# Patient Record
Sex: FEMALE | Race: WHITE | NOT HISPANIC OR LATINO | Employment: FULL TIME | URBAN - METROPOLITAN AREA
[De-identification: names, ages, dates, MRNs, and addresses within clinical notes are randomized per-mention and may not be internally consistent; named-entity substitution may affect disease eponyms.]

---

## 2020-08-28 ENCOUNTER — APPOINTMENT (OUTPATIENT)
Dept: CT IMAGING | Facility: HOSPITAL | Age: 50
End: 2020-08-28

## 2020-08-28 ENCOUNTER — APPOINTMENT (OUTPATIENT)
Dept: CARDIOLOGY | Facility: HOSPITAL | Age: 50
End: 2020-08-28

## 2020-08-28 ENCOUNTER — HOSPITAL ENCOUNTER (INPATIENT)
Facility: HOSPITAL | Age: 50
LOS: 2 days | Discharge: HOME | End: 2020-08-30
Attending: EMERGENCY MEDICINE | Admitting: INTERNAL MEDICINE

## 2020-08-28 DIAGNOSIS — Q05.9 SPINA BIFIDA, UNSPECIFIED HYDROCEPHALUS PRESENCE, UNSPECIFIED SPINAL REGION (HCC): ICD-10-CM

## 2020-08-28 DIAGNOSIS — I82.4Y3 DVT, LOWER EXTREMITY, PROXIMAL, ACUTE, BILATERAL (HCC): Primary | ICD-10-CM

## 2020-08-28 LAB
ALBUMIN SERPL-MCNC: 4 G/DL (ref 3.5–5.2)
ALBUMIN/GLOB SERPL: 1.3 G/DL
ALP SERPL-CCNC: 121 U/L (ref 39–117)
ALT SERPL W P-5'-P-CCNC: 16 U/L (ref 1–33)
ANION GAP SERPL CALCULATED.3IONS-SCNC: 11.8 MMOL/L (ref 5–15)
APTT PPP: 27.1 SECONDS (ref 22.7–35.4)
AST SERPL-CCNC: 16 U/L (ref 1–32)
BASOPHILS # BLD AUTO: 0.04 10*3/MM3 (ref 0–0.2)
BASOPHILS NFR BLD AUTO: 0.4 % (ref 0–1.5)
BH CV LOW VAS LEFT COMMON FEMORAL SPONT: 1
BH CV LOW VAS LEFT DISTAL FEMORAL SPONT: 1
BH CV LOW VAS LEFT EXTERNAL ILIAC AUGMENT: NORMAL
BH CV LOW VAS LEFT EXTERNAL ILIAC COMPRESS: NORMAL
BH CV LOW VAS LEFT EXTERNAL ILIAC SPONT: 1
BH CV LOW VAS LEFT EXTERNAL ILIAC THROMBUS: NORMAL
BH CV LOW VAS LEFT GREATER SAPH AK VESSEL: 1
BH CV LOW VAS LEFT GREATER SAPH BK VESSEL: 1
BH CV LOW VAS LEFT MID FEMORAL SPONT: 1
BH CV LOW VAS LEFT PROFUNDA FEMORAL SPONT: 1
BH CV LOW VAS LEFT PROXIMAL FEMORAL SPONT: 1
BH CV LOW VAS LEFT SAPHENOFEMORAL JUNCTION SPONT: 1
BH CV LOW VAS LEFT VARICOSITY BK VESSEL: 1
BH CV LOW VAS RIGHT COMMON FEMORAL SPONT: 1
BH CV LOW VAS RIGHT DISTAL FEMORAL SPONT: 1
BH CV LOW VAS RIGHT EXTERNAL ILIAC SPONT: 1
BH CV LOW VAS RIGHT GREATER SAPH AK VESSEL: 1
BH CV LOW VAS RIGHT LESSER SAPH VESSEL: 1
BH CV LOW VAS RIGHT MID FEMORAL SPONT: 1
BH CV LOW VAS RIGHT POPLITEAL SPONT: 1
BH CV LOW VAS RIGHT PROFUNDA FEMORAL SPONT: 1
BH CV LOW VAS RIGHT PROXIMAL FEMORAL SPONT: 1
BH CV LOW VAS RIGHT SAPHENOFEMORAL JUNCTION SPONT: 1
BH CV LOW VAS RIGHT VARICOSITY BK VESSEL: 1
BH CV LOWER VASCULAR LEFT COMMON FEMORAL AUGMENT: NORMAL
BH CV LOWER VASCULAR LEFT COMMON FEMORAL COMPRESS: NORMAL
BH CV LOWER VASCULAR LEFT COMMON FEMORAL PHASIC: NORMAL
BH CV LOWER VASCULAR LEFT COMMON FEMORAL SPONT: NORMAL
BH CV LOWER VASCULAR LEFT COMMON FEMORAL THROMBUS: NORMAL
BH CV LOWER VASCULAR LEFT DISTAL FEMORAL COMPRESS: NORMAL
BH CV LOWER VASCULAR LEFT DISTAL FEMORAL THROMBUS: NORMAL
BH CV LOWER VASCULAR LEFT EXTERNAL ILIAC PHASIC: NORMAL
BH CV LOWER VASCULAR LEFT EXTERNAL ILIAC SPONT: NORMAL
BH CV LOWER VASCULAR LEFT GASTRONEMIUS COMPRESS: NORMAL
BH CV LOWER VASCULAR LEFT GREATER SAPH AK COMPRESS: NORMAL
BH CV LOWER VASCULAR LEFT GREATER SAPH AK THROMBUS: NORMAL
BH CV LOWER VASCULAR LEFT GREATER SAPH BK COMPRESS: NORMAL
BH CV LOWER VASCULAR LEFT GREATER SAPH BK THROMBUS: NORMAL
BH CV LOWER VASCULAR LEFT LESSER SAPH COMPRESS: NORMAL
BH CV LOWER VASCULAR LEFT MID FEMORAL AUGMENT: NORMAL
BH CV LOWER VASCULAR LEFT MID FEMORAL COMPRESS: NORMAL
BH CV LOWER VASCULAR LEFT MID FEMORAL PHASIC: NORMAL
BH CV LOWER VASCULAR LEFT MID FEMORAL SPONT: NORMAL
BH CV LOWER VASCULAR LEFT MID FEMORAL THROMBUS: NORMAL
BH CV LOWER VASCULAR LEFT PERONEAL COMPRESS: NORMAL
BH CV LOWER VASCULAR LEFT POPLITEAL AUGMENT: NORMAL
BH CV LOWER VASCULAR LEFT POPLITEAL COMPRESS: NORMAL
BH CV LOWER VASCULAR LEFT POPLITEAL PHASIC: NORMAL
BH CV LOWER VASCULAR LEFT POPLITEAL SPONT: NORMAL
BH CV LOWER VASCULAR LEFT POSTERIOR TIBIAL COMPRESS: NORMAL
BH CV LOWER VASCULAR LEFT PROFUNDA FEMORAL COMPRESS: NORMAL
BH CV LOWER VASCULAR LEFT PROFUNDA FEMORAL THROMBUS: NORMAL
BH CV LOWER VASCULAR LEFT PROXIMAL FEMORAL COMPRESS: NORMAL
BH CV LOWER VASCULAR LEFT PROXIMAL FEMORAL THROMBUS: NORMAL
BH CV LOWER VASCULAR LEFT SAPHENOFEMORAL JUNCTION COMPRESS: NORMAL
BH CV LOWER VASCULAR LEFT SAPHENOFEMORAL JUNCTION THROMBUS: NORMAL
BH CV LOWER VASCULAR LEFT VARICOSITY BK COMPRESS: NORMAL
BH CV LOWER VASCULAR LEFT VARICOSITY BK THROMBUS: NORMAL
BH CV LOWER VASCULAR RIGHT COMMON FEMORAL AUGMENT: NORMAL
BH CV LOWER VASCULAR RIGHT COMMON FEMORAL COMPETENT: NORMAL
BH CV LOWER VASCULAR RIGHT COMMON FEMORAL COMPRESS: NORMAL
BH CV LOWER VASCULAR RIGHT COMMON FEMORAL PHASIC: NORMAL
BH CV LOWER VASCULAR RIGHT COMMON FEMORAL SPONT: NORMAL
BH CV LOWER VASCULAR RIGHT COMMON FEMORAL THROMBUS: NORMAL
BH CV LOWER VASCULAR RIGHT DISTAL FEMORAL COMPRESS: NORMAL
BH CV LOWER VASCULAR RIGHT DISTAL FEMORAL THROMBUS: NORMAL
BH CV LOWER VASCULAR RIGHT EXTERNAL ILIAC AUGMENT: NORMAL
BH CV LOWER VASCULAR RIGHT EXTERNAL ILIAC COMPETENT: NORMAL
BH CV LOWER VASCULAR RIGHT EXTERNAL ILIAC COMPRESS: NORMAL
BH CV LOWER VASCULAR RIGHT EXTERNAL ILIAC PHASIC: NORMAL
BH CV LOWER VASCULAR RIGHT EXTERNAL ILIAC SPONT: NORMAL
BH CV LOWER VASCULAR RIGHT EXTERNAL ILIAC THROMBUS: NORMAL
BH CV LOWER VASCULAR RIGHT GASTRONEMIUS COMPRESS: NORMAL
BH CV LOWER VASCULAR RIGHT GREATER SAPH AK COMPRESS: NORMAL
BH CV LOWER VASCULAR RIGHT GREATER SAPH AK THROMBUS: NORMAL
BH CV LOWER VASCULAR RIGHT GREATER SAPH BK COMPRESS: NORMAL
BH CV LOWER VASCULAR RIGHT LESSER SAPH COMPRESS: NORMAL
BH CV LOWER VASCULAR RIGHT LESSER SAPH THROMBUS: NORMAL
BH CV LOWER VASCULAR RIGHT MID FEMORAL AUGMENT: NORMAL
BH CV LOWER VASCULAR RIGHT MID FEMORAL COMPETENT: NORMAL
BH CV LOWER VASCULAR RIGHT MID FEMORAL COMPRESS: NORMAL
BH CV LOWER VASCULAR RIGHT MID FEMORAL PHASIC: NORMAL
BH CV LOWER VASCULAR RIGHT MID FEMORAL SPONT: NORMAL
BH CV LOWER VASCULAR RIGHT MID FEMORAL THROMBUS: NORMAL
BH CV LOWER VASCULAR RIGHT PERONEAL COMPRESS: NORMAL
BH CV LOWER VASCULAR RIGHT POPLITEAL AUGMENT: NORMAL
BH CV LOWER VASCULAR RIGHT POPLITEAL COMPRESS: NORMAL
BH CV LOWER VASCULAR RIGHT POPLITEAL PHASIC: NORMAL
BH CV LOWER VASCULAR RIGHT POPLITEAL SPONT: NORMAL
BH CV LOWER VASCULAR RIGHT POPLITEAL THROMBUS: NORMAL
BH CV LOWER VASCULAR RIGHT POSTERIOR TIBIAL COMPRESS: NORMAL
BH CV LOWER VASCULAR RIGHT PROFUNDA FEMORAL COMPRESS: NORMAL
BH CV LOWER VASCULAR RIGHT PROFUNDA FEMORAL THROMBUS: NORMAL
BH CV LOWER VASCULAR RIGHT PROXIMAL FEMORAL COMPRESS: NORMAL
BH CV LOWER VASCULAR RIGHT PROXIMAL FEMORAL THROMBUS: NORMAL
BH CV LOWER VASCULAR RIGHT SAPHENOFEMORAL JUNCTION COMPRESS: NORMAL
BH CV LOWER VASCULAR RIGHT SAPHENOFEMORAL JUNCTION THROMBUS: NORMAL
BH CV LOWER VASCULAR RIGHT VARICOSITY BK COMPRESS: NORMAL
BH CV LOWER VASCULAR RIGHT VARICOSITY BK THROMBUS: NORMAL
BILIRUB SERPL-MCNC: 0.7 MG/DL (ref 0–1.2)
BUN SERPL-MCNC: 6 MG/DL (ref 6–20)
BUN/CREAT SERPL: 11.1 (ref 7–25)
CALCIUM SPEC-SCNC: 8.9 MG/DL (ref 8.6–10.5)
CHLORIDE SERPL-SCNC: 105 MMOL/L (ref 98–107)
CO2 SERPL-SCNC: 24.2 MMOL/L (ref 22–29)
CREAT SERPL-MCNC: 0.54 MG/DL (ref 0.57–1)
DEPRECATED RDW RBC AUTO: 63.7 FL (ref 37–54)
EOSINOPHIL # BLD AUTO: 0.68 10*3/MM3 (ref 0–0.4)
EOSINOPHIL NFR BLD AUTO: 7.1 % (ref 0.3–6.2)
ERYTHROCYTE [DISTWIDTH] IN BLOOD BY AUTOMATED COUNT: 18.6 % (ref 12.3–15.4)
GFR SERPL CREATININE-BSD FRML MDRD: 120 ML/MIN/1.73
GLOBULIN UR ELPH-MCNC: 3.1 GM/DL
GLUCOSE SERPL-MCNC: 92 MG/DL (ref 65–99)
HCT VFR BLD AUTO: 39.4 % (ref 34–46.6)
HGB BLD-MCNC: 12.4 G/DL (ref 12–15.9)
IMM GRANULOCYTES # BLD AUTO: 0.07 10*3/MM3 (ref 0–0.05)
IMM GRANULOCYTES NFR BLD AUTO: 0.7 % (ref 0–0.5)
INR PPP: 1.07 (ref 0.9–1.1)
LYMPHOCYTES # BLD AUTO: 0.89 10*3/MM3 (ref 0.7–3.1)
LYMPHOCYTES NFR BLD AUTO: 9.3 % (ref 19.6–45.3)
MCH RBC QN AUTO: 29.4 PG (ref 26.6–33)
MCHC RBC AUTO-ENTMCNC: 31.5 G/DL (ref 31.5–35.7)
MCV RBC AUTO: 93.4 FL (ref 79–97)
MONOCYTES # BLD AUTO: 0.64 10*3/MM3 (ref 0.1–0.9)
MONOCYTES NFR BLD AUTO: 6.7 % (ref 5–12)
NEUTROPHILS NFR BLD AUTO: 7.22 10*3/MM3 (ref 1.7–7)
NEUTROPHILS NFR BLD AUTO: 75.8 % (ref 42.7–76)
NRBC BLD AUTO-RTO: 0 /100 WBC (ref 0–0.2)
PLATELET # BLD AUTO: 284 10*3/MM3 (ref 140–450)
PMV BLD AUTO: 9 FL (ref 6–12)
POTASSIUM SERPL-SCNC: 3.6 MMOL/L (ref 3.5–5.2)
PROT SERPL-MCNC: 7.1 G/DL (ref 6–8.5)
PROTHROMBIN TIME: 13.8 SECONDS (ref 11.7–14.2)
RBC # BLD AUTO: 4.22 10*6/MM3 (ref 3.77–5.28)
SODIUM SERPL-SCNC: 141 MMOL/L (ref 136–145)
WBC # BLD AUTO: 9.54 10*3/MM3 (ref 3.4–10.8)

## 2020-08-28 PROCEDURE — 85610 PROTHROMBIN TIME: CPT | Performed by: EMERGENCY MEDICINE

## 2020-08-28 PROCEDURE — 80053 COMPREHEN METABOLIC PANEL: CPT | Performed by: EMERGENCY MEDICINE

## 2020-08-28 PROCEDURE — 25010000002 HEPARIN (PORCINE) PER 1000 UNITS: Performed by: EMERGENCY MEDICINE

## 2020-08-28 PROCEDURE — 71275 CT ANGIOGRAPHY CHEST: CPT

## 2020-08-28 PROCEDURE — 85025 COMPLETE CBC W/AUTO DIFF WBC: CPT | Performed by: EMERGENCY MEDICINE

## 2020-08-28 PROCEDURE — 99284 EMERGENCY DEPT VISIT MOD MDM: CPT

## 2020-08-28 PROCEDURE — 93970 EXTREMITY STUDY: CPT

## 2020-08-28 PROCEDURE — U0004 COV-19 TEST NON-CDC HGH THRU: HCPCS | Performed by: EMERGENCY MEDICINE

## 2020-08-28 PROCEDURE — C9803 HOPD COVID-19 SPEC COLLECT: HCPCS | Performed by: EMERGENCY MEDICINE

## 2020-08-28 PROCEDURE — 85730 THROMBOPLASTIN TIME PARTIAL: CPT | Performed by: EMERGENCY MEDICINE

## 2020-08-28 PROCEDURE — 0 IOPAMIDOL PER 1 ML: Performed by: INTERNAL MEDICINE

## 2020-08-28 RX ORDER — AMOXICILLIN 250 MG
1 CAPSULE ORAL DAILY
Status: DISCONTINUED | OUTPATIENT
Start: 2020-08-29 | End: 2020-08-30 | Stop reason: HOSPADM

## 2020-08-28 RX ORDER — ACETAMINOPHEN 160 MG/5ML
650 SOLUTION ORAL EVERY 4 HOURS PRN
Status: DISCONTINUED | OUTPATIENT
Start: 2020-08-28 | End: 2020-08-30 | Stop reason: HOSPADM

## 2020-08-28 RX ORDER — AMOXICILLIN 250 MG
1 CAPSULE ORAL EVERY MORNING
COMMUNITY

## 2020-08-28 RX ORDER — ACETAMINOPHEN 650 MG/1
650 SUPPOSITORY RECTAL EVERY 4 HOURS PRN
Status: DISCONTINUED | OUTPATIENT
Start: 2020-08-28 | End: 2020-08-30 | Stop reason: HOSPADM

## 2020-08-28 RX ORDER — HEPARIN SODIUM 5000 [USP'U]/ML
80 INJECTION, SOLUTION INTRAVENOUS; SUBCUTANEOUS ONCE
Status: COMPLETED | OUTPATIENT
Start: 2020-08-28 | End: 2020-08-28

## 2020-08-28 RX ORDER — ONDANSETRON 2 MG/ML
4 INJECTION INTRAMUSCULAR; INTRAVENOUS EVERY 6 HOURS PRN
Status: DISCONTINUED | OUTPATIENT
Start: 2020-08-28 | End: 2020-08-30 | Stop reason: HOSPADM

## 2020-08-28 RX ORDER — SODIUM CHLORIDE 0.9 % (FLUSH) 0.9 %
10 SYRINGE (ML) INJECTION AS NEEDED
Status: DISCONTINUED | OUTPATIENT
Start: 2020-08-28 | End: 2020-08-30 | Stop reason: HOSPADM

## 2020-08-28 RX ORDER — HEPARIN SODIUM 10000 [USP'U]/100ML
18 INJECTION, SOLUTION INTRAVENOUS
Status: DISCONTINUED | OUTPATIENT
Start: 2020-08-28 | End: 2020-08-30

## 2020-08-28 RX ORDER — ACETAMINOPHEN 325 MG/1
650 TABLET ORAL EVERY 4 HOURS PRN
Status: DISCONTINUED | OUTPATIENT
Start: 2020-08-28 | End: 2020-08-30 | Stop reason: HOSPADM

## 2020-08-28 RX ORDER — SODIUM CHLORIDE 0.9 % (FLUSH) 0.9 %
10 SYRINGE (ML) INJECTION EVERY 12 HOURS SCHEDULED
Status: DISCONTINUED | OUTPATIENT
Start: 2020-08-28 | End: 2020-08-30 | Stop reason: HOSPADM

## 2020-08-28 RX ORDER — HEPARIN SODIUM 5000 [USP'U]/ML
40-80 INJECTION, SOLUTION INTRAVENOUS; SUBCUTANEOUS EVERY 6 HOURS PRN
Status: DISCONTINUED | OUTPATIENT
Start: 2020-08-28 | End: 2020-08-30

## 2020-08-28 RX ADMIN — IOPAMIDOL 95 ML: 755 INJECTION, SOLUTION INTRAVENOUS at 18:14

## 2020-08-28 RX ADMIN — HEPARIN SODIUM 6900 UNITS: 5000 INJECTION INTRAVENOUS; SUBCUTANEOUS at 17:20

## 2020-08-28 RX ADMIN — HEPARIN SODIUM 18 UNITS/KG/HR: 10000 INJECTION, SOLUTION INTRAVENOUS at 17:19

## 2020-08-28 RX ADMIN — SODIUM CHLORIDE, PRESERVATIVE FREE 10 ML: 5 INJECTION INTRAVENOUS at 21:26

## 2020-08-29 LAB
ANION GAP SERPL CALCULATED.3IONS-SCNC: 11.7 MMOL/L (ref 5–15)
APTT PPP: 89.9 SECONDS (ref 22.7–35.4)
APTT PPP: 91.4 SECONDS (ref 22.7–35.4)
BASOPHILS # BLD AUTO: 0.03 10*3/MM3 (ref 0–0.2)
BASOPHILS NFR BLD AUTO: 0.3 % (ref 0–1.5)
BUN SERPL-MCNC: 7 MG/DL (ref 6–20)
BUN/CREAT SERPL: 13.2 (ref 7–25)
CALCIUM SPEC-SCNC: 8.1 MG/DL (ref 8.6–10.5)
CHLORIDE SERPL-SCNC: 106 MMOL/L (ref 98–107)
CO2 SERPL-SCNC: 21.3 MMOL/L (ref 22–29)
CREAT SERPL-MCNC: 0.53 MG/DL (ref 0.57–1)
CRP SERPL-MCNC: 1.32 MG/DL (ref 0–0.5)
DEPRECATED RDW RBC AUTO: 62.1 FL (ref 37–54)
EOSINOPHIL # BLD AUTO: 1.01 10*3/MM3 (ref 0–0.4)
EOSINOPHIL NFR BLD AUTO: 11.6 % (ref 0.3–6.2)
ERYTHROCYTE [DISTWIDTH] IN BLOOD BY AUTOMATED COUNT: 18.6 % (ref 12.3–15.4)
FERRITIN SERPL-MCNC: 34.2 NG/ML (ref 13–150)
FOLATE SERPL-MCNC: 3.2 NG/ML (ref 4.78–24.2)
GFR SERPL CREATININE-BSD FRML MDRD: 123 ML/MIN/1.73
GLUCOSE SERPL-MCNC: 101 MG/DL (ref 65–99)
HCT VFR BLD AUTO: 35.2 % (ref 34–46.6)
HGB BLD-MCNC: 11.5 G/DL (ref 12–15.9)
HGB RETIC QN AUTO: 32.9 PG (ref 29.8–36.1)
IMM GRANULOCYTES # BLD AUTO: 0.06 10*3/MM3 (ref 0–0.05)
IMM GRANULOCYTES NFR BLD AUTO: 0.7 % (ref 0–0.5)
IMM RETICS NFR: 28.3 % (ref 3–15.8)
IRON 24H UR-MRATE: 27 MCG/DL (ref 37–145)
IRON SATN MFR SERPL: 6 % (ref 20–50)
LYMPHOCYTES # BLD AUTO: 1.2 10*3/MM3 (ref 0.7–3.1)
LYMPHOCYTES NFR BLD AUTO: 13.8 % (ref 19.6–45.3)
MAGNESIUM SERPL-MCNC: 1.8 MG/DL (ref 1.6–2.6)
MCH RBC QN AUTO: 29.9 PG (ref 26.6–33)
MCHC RBC AUTO-ENTMCNC: 32.7 G/DL (ref 31.5–35.7)
MCV RBC AUTO: 91.7 FL (ref 79–97)
MONOCYTES # BLD AUTO: 0.73 10*3/MM3 (ref 0.1–0.9)
MONOCYTES NFR BLD AUTO: 8.4 % (ref 5–12)
NEUTROPHILS NFR BLD AUTO: 5.67 10*3/MM3 (ref 1.7–7)
NEUTROPHILS NFR BLD AUTO: 65.2 % (ref 42.7–76)
NRBC BLD AUTO-RTO: 0 /100 WBC (ref 0–0.2)
PLATELET # BLD AUTO: 267 10*3/MM3 (ref 140–450)
PMV BLD AUTO: 10 FL (ref 6–12)
POTASSIUM SERPL-SCNC: 4 MMOL/L (ref 3.5–5.2)
RBC # BLD AUTO: 3.84 10*6/MM3 (ref 3.77–5.28)
RETICS # AUTO: 0.09 10*6/MM3 (ref 0.02–0.13)
RETICS/RBC NFR AUTO: 2.17 % (ref 0.7–1.9)
SODIUM SERPL-SCNC: 139 MMOL/L (ref 136–145)
TIBC SERPL-MCNC: 422 MCG/DL (ref 298–536)
TRANSFERRIN SERPL-MCNC: 283 MG/DL (ref 200–360)
VIT B12 BLD-MCNC: 279 PG/ML (ref 211–946)
WBC # BLD AUTO: 8.7 10*3/MM3 (ref 3.4–10.8)

## 2020-08-29 PROCEDURE — 82607 VITAMIN B-12: CPT | Performed by: INTERNAL MEDICINE

## 2020-08-29 PROCEDURE — 36415 COLL VENOUS BLD VENIPUNCTURE: CPT | Performed by: EMERGENCY MEDICINE

## 2020-08-29 PROCEDURE — 85670 THROMBIN TIME PLASMA: CPT | Performed by: INTERNAL MEDICINE

## 2020-08-29 PROCEDURE — 25010000002 HEPARIN (PORCINE) PER 1000 UNITS: Performed by: EMERGENCY MEDICINE

## 2020-08-29 PROCEDURE — 84466 ASSAY OF TRANSFERRIN: CPT | Performed by: INTERNAL MEDICINE

## 2020-08-29 PROCEDURE — 86140 C-REACTIVE PROTEIN: CPT | Performed by: INTERNAL MEDICINE

## 2020-08-29 PROCEDURE — 85732 THROMBOPLASTIN TIME PARTIAL: CPT | Performed by: INTERNAL MEDICINE

## 2020-08-29 PROCEDURE — 83540 ASSAY OF IRON: CPT | Performed by: INTERNAL MEDICINE

## 2020-08-29 PROCEDURE — 85025 COMPLETE CBC W/AUTO DIFF WBC: CPT | Performed by: EMERGENCY MEDICINE

## 2020-08-29 PROCEDURE — 81241 F5 GENE: CPT | Performed by: INTERNAL MEDICINE

## 2020-08-29 PROCEDURE — 85300 ANTITHROMBIN III ACTIVITY: CPT | Performed by: INTERNAL MEDICINE

## 2020-08-29 PROCEDURE — 85613 RUSSELL VIPER VENOM DILUTED: CPT | Performed by: INTERNAL MEDICINE

## 2020-08-29 PROCEDURE — 86146 BETA-2 GLYCOPROTEIN ANTIBODY: CPT | Performed by: INTERNAL MEDICINE

## 2020-08-29 PROCEDURE — 82728 ASSAY OF FERRITIN: CPT | Performed by: INTERNAL MEDICINE

## 2020-08-29 PROCEDURE — 85730 THROMBOPLASTIN TIME PARTIAL: CPT | Performed by: EMERGENCY MEDICINE

## 2020-08-29 PROCEDURE — 82746 ASSAY OF FOLIC ACID SERUM: CPT | Performed by: INTERNAL MEDICINE

## 2020-08-29 PROCEDURE — 85046 RETICYTE/HGB CONCENTRATE: CPT | Performed by: INTERNAL MEDICINE

## 2020-08-29 PROCEDURE — 83735 ASSAY OF MAGNESIUM: CPT | Performed by: INTERNAL MEDICINE

## 2020-08-29 PROCEDURE — 86147 CARDIOLIPIN ANTIBODY EA IG: CPT | Performed by: INTERNAL MEDICINE

## 2020-08-29 PROCEDURE — 80048 BASIC METABOLIC PNL TOTAL CA: CPT | Performed by: INTERNAL MEDICINE

## 2020-08-29 PROCEDURE — 99255 IP/OBS CONSLTJ NEW/EST HI 80: CPT | Performed by: INTERNAL MEDICINE

## 2020-08-29 PROCEDURE — 81240 F2 GENE: CPT | Performed by: INTERNAL MEDICINE

## 2020-08-29 PROCEDURE — 85705 THROMBOPLASTIN INHIBITION: CPT | Performed by: INTERNAL MEDICINE

## 2020-08-29 RX ADMIN — SODIUM CHLORIDE, PRESERVATIVE FREE 10 ML: 5 INJECTION INTRAVENOUS at 08:43

## 2020-08-29 RX ADMIN — HEPARIN SODIUM 18 UNITS/KG/HR: 10000 INJECTION, SOLUTION INTRAVENOUS at 09:34

## 2020-08-29 RX ADMIN — HEPARIN SODIUM 18 UNITS/KG/HR: 10000 INJECTION, SOLUTION INTRAVENOUS at 19:16

## 2020-08-30 VITALS
BODY MASS INDEX: 40.03 KG/M2 | WEIGHT: 190.7 LBS | DIASTOLIC BLOOD PRESSURE: 95 MMHG | SYSTOLIC BLOOD PRESSURE: 160 MMHG | HEIGHT: 58 IN | RESPIRATION RATE: 18 BRPM | TEMPERATURE: 98.2 F | OXYGEN SATURATION: 95 % | HEART RATE: 82 BPM

## 2020-08-30 LAB
APTT PPP: 106.7 SECONDS (ref 22.7–35.4)
BASOPHILS # BLD AUTO: 0.04 10*3/MM3 (ref 0–0.2)
BASOPHILS NFR BLD AUTO: 0.5 % (ref 0–1.5)
DEPRECATED RDW RBC AUTO: 58.9 FL (ref 37–54)
EOSINOPHIL # BLD AUTO: 0.8 10*3/MM3 (ref 0–0.4)
EOSINOPHIL NFR BLD AUTO: 11 % (ref 0.3–6.2)
ERYTHROCYTE [DISTWIDTH] IN BLOOD BY AUTOMATED COUNT: 18.2 % (ref 12.3–15.4)
HCT VFR BLD AUTO: 33.8 % (ref 34–46.6)
HGB BLD-MCNC: 11.2 G/DL (ref 12–15.9)
IMM GRANULOCYTES # BLD AUTO: 0.07 10*3/MM3 (ref 0–0.05)
IMM GRANULOCYTES NFR BLD AUTO: 1 % (ref 0–0.5)
LYMPHOCYTES # BLD AUTO: 1.19 10*3/MM3 (ref 0.7–3.1)
LYMPHOCYTES NFR BLD AUTO: 16.3 % (ref 19.6–45.3)
MCH RBC QN AUTO: 29.6 PG (ref 26.6–33)
MCHC RBC AUTO-ENTMCNC: 33.1 G/DL (ref 31.5–35.7)
MCV RBC AUTO: 89.4 FL (ref 79–97)
MONOCYTES # BLD AUTO: 0.6 10*3/MM3 (ref 0.1–0.9)
MONOCYTES NFR BLD AUTO: 8.2 % (ref 5–12)
NEUTROPHILS NFR BLD AUTO: 4.58 10*3/MM3 (ref 1.7–7)
NEUTROPHILS NFR BLD AUTO: 63 % (ref 42.7–76)
NRBC BLD AUTO-RTO: 0 /100 WBC (ref 0–0.2)
PLATELET # BLD AUTO: 295 10*3/MM3 (ref 140–450)
PMV BLD AUTO: 8.8 FL (ref 6–12)
RBC # BLD AUTO: 3.78 10*6/MM3 (ref 3.77–5.28)
WBC # BLD AUTO: 7.28 10*3/MM3 (ref 3.4–10.8)

## 2020-08-30 PROCEDURE — 85730 THROMBOPLASTIN TIME PARTIAL: CPT | Performed by: EMERGENCY MEDICINE

## 2020-08-30 PROCEDURE — 83516 IMMUNOASSAY NONANTIBODY: CPT | Performed by: INTERNAL MEDICINE

## 2020-08-30 PROCEDURE — 86255 FLUORESCENT ANTIBODY SCREEN: CPT | Performed by: INTERNAL MEDICINE

## 2020-08-30 PROCEDURE — 82784 ASSAY IGA/IGD/IGG/IGM EACH: CPT | Performed by: INTERNAL MEDICINE

## 2020-08-30 PROCEDURE — 36415 COLL VENOUS BLD VENIPUNCTURE: CPT | Performed by: EMERGENCY MEDICINE

## 2020-08-30 PROCEDURE — 25010000002 HEPARIN (PORCINE) PER 1000 UNITS: Performed by: EMERGENCY MEDICINE

## 2020-08-30 PROCEDURE — 99233 SBSQ HOSP IP/OBS HIGH 50: CPT | Performed by: INTERNAL MEDICINE

## 2020-08-30 PROCEDURE — 85025 COMPLETE CBC W/AUTO DIFF WBC: CPT | Performed by: EMERGENCY MEDICINE

## 2020-08-30 RX ORDER — CYANOCOBALAMIN 1000 UG/ML
1000 INJECTION, SOLUTION INTRAMUSCULAR; SUBCUTANEOUS ONCE
Status: DISCONTINUED | OUTPATIENT
Start: 2020-08-30 | End: 2020-08-30 | Stop reason: HOSPADM

## 2020-08-30 RX ORDER — FOLIC ACID 1 MG/1
1 TABLET ORAL DAILY
Status: DISCONTINUED | OUTPATIENT
Start: 2020-08-30 | End: 2020-08-30 | Stop reason: HOSPADM

## 2020-08-30 RX ADMIN — APIXABAN 10 MG: 5 TABLET, FILM COATED ORAL at 11:09

## 2020-08-30 RX ADMIN — SODIUM CHLORIDE, PRESERVATIVE FREE 10 ML: 5 INJECTION INTRAVENOUS at 09:43

## 2020-08-30 RX ADMIN — FOLIC ACID 1 MG: 1 TABLET ORAL at 11:09

## 2020-08-30 RX ADMIN — HEPARIN SODIUM 18 UNITS/KG/HR: 10000 INJECTION, SOLUTION INTRAVENOUS at 01:41

## 2020-08-31 DIAGNOSIS — I82.4Y3 DVT, LOWER EXTREMITY, PROXIMAL, ACUTE, BILATERAL (HCC): Primary | ICD-10-CM

## 2020-08-31 LAB
AT III PPP CHRO-ACNC: 69 % (ref 90–134)
F5 GENE MUT ANL BLD/T: ABNORMAL
FACTOR II, DNA ANALYSIS: NORMAL
REF LAB TEST METHOD: NORMAL
SARS-COV-2 RNA RESP QL NAA+PROBE: NOT DETECTED

## 2020-09-01 LAB
CARDIOLIPIN IGA SER IA-ACNC: <9 APL U/ML (ref 0–11)
CARDIOLIPIN IGG SER IA-ACNC: <9 GPL U/ML (ref 0–14)
CARDIOLIPIN IGM SER IA-ACNC: 12 MPL U/ML (ref 0–12)
ENDOMYSIUM IGA SER QL: NEGATIVE
GLIADIN PEPTIDE IGA SER-ACNC: 3 UNITS (ref 0–19)
GLIADIN PEPTIDE IGG SER-ACNC: 4 UNITS (ref 0–19)
IGA SERPL-MCNC: 155 MG/DL (ref 87–352)
TTG IGA SER-ACNC: <2 U/ML (ref 0–3)
TTG IGG SER-ACNC: 3 U/ML (ref 0–5)

## 2020-09-02 LAB
B2 GLYCOPROT1 IGA SER-ACNC: <9 GPI IGA UNITS (ref 0–25)
B2 GLYCOPROT1 IGG SER-ACNC: <9 GPI IGG UNITS (ref 0–20)
B2 GLYCOPROT1 IGM SER-ACNC: <9 GPI IGM UNITS (ref 0–32)
LA NT DPL PPP: 46 SEC (ref 0–55)
LA NT DPL/LA NT HPL PPP-RTO: 0.9 RATIO (ref 0–1.4)
LA NT PLATELET PPP: 37.6 SEC (ref 0–51.9)
LUPUS ANTICOAGULANT REFLEX: ABNORMAL
SCREEN DRVVT: 44.9 SEC (ref 0–47)
THROMBIN NEUTRALIZATION: 17.6 SEC (ref 0–23)
THROMBIN TIME MIX: 51.4 SEC (ref 0–23)
THROMBIN TIME: 109.6 SEC (ref 0–23)

## 2020-09-09 ENCOUNTER — TELEPHONE (OUTPATIENT)
Dept: ONCOLOGY | Facility: CLINIC | Age: 50
End: 2020-09-09

## 2020-09-09 NOTE — TELEPHONE ENCOUNTER
Caller: Angélica    Relationship to patient: Self    Best call back number: [phone]     Type of visit: Lab and follow up     Requested date: Prefers Mon 09/28 after 1:30pm    If rescheduling, when is the original appointment: 09/29    Additional notes: HUB unable to schedule within timeframe

## 2020-10-01 ENCOUNTER — APPOINTMENT (OUTPATIENT)
Dept: LAB | Facility: HOSPITAL | Age: 50
End: 2020-10-01

## 2020-10-01 ENCOUNTER — LAB (OUTPATIENT)
Dept: LAB | Facility: HOSPITAL | Age: 50
End: 2020-10-01

## 2020-10-01 ENCOUNTER — DOCUMENTATION (OUTPATIENT)
Dept: ONCOLOGY | Facility: CLINIC | Age: 50
End: 2020-10-01

## 2020-10-01 ENCOUNTER — DOCUMENTATION (OUTPATIENT)
Dept: PHARMACY | Facility: HOSPITAL | Age: 50
End: 2020-10-01

## 2020-10-01 ENCOUNTER — OFFICE VISIT (OUTPATIENT)
Dept: ONCOLOGY | Facility: CLINIC | Age: 50
End: 2020-10-01

## 2020-10-01 VITALS
DIASTOLIC BLOOD PRESSURE: 93 MMHG | TEMPERATURE: 97.9 F | HEIGHT: 58 IN | RESPIRATION RATE: 20 BRPM | HEART RATE: 94 BPM | SYSTOLIC BLOOD PRESSURE: 152 MMHG | OXYGEN SATURATION: 99 % | BODY MASS INDEX: 39.87 KG/M2

## 2020-10-01 DIAGNOSIS — I82.4Y3 DVT, LOWER EXTREMITY, PROXIMAL, ACUTE, BILATERAL (HCC): ICD-10-CM

## 2020-10-01 DIAGNOSIS — I82.4Y3 DVT, LOWER EXTREMITY, PROXIMAL, ACUTE, BILATERAL (HCC): Primary | ICD-10-CM

## 2020-10-01 LAB
ALBUMIN SERPL-MCNC: 4.2 G/DL (ref 3.5–5.2)
ALBUMIN/GLOB SERPL: 1.4 G/DL (ref 1.1–2.4)
ALP SERPL-CCNC: 93 U/L (ref 38–116)
ALT SERPL W P-5'-P-CCNC: 16 U/L (ref 0–33)
ANION GAP SERPL CALCULATED.3IONS-SCNC: 8.7 MMOL/L (ref 5–15)
AST SERPL-CCNC: 18 U/L (ref 0–32)
BASOPHILS # BLD AUTO: 0.03 10*3/MM3 (ref 0–0.2)
BASOPHILS NFR BLD AUTO: 0.4 % (ref 0–1.5)
BILIRUB SERPL-MCNC: 1.4 MG/DL (ref 0.2–1.2)
BUN SERPL-MCNC: 7 MG/DL (ref 6–20)
BUN/CREAT SERPL: 11.9 (ref 7.3–30)
CALCIUM SPEC-SCNC: 9.3 MG/DL (ref 8.5–10.2)
CHLORIDE SERPL-SCNC: 105 MMOL/L (ref 98–107)
CO2 SERPL-SCNC: 24.3 MMOL/L (ref 22–29)
CREAT SERPL-MCNC: 0.59 MG/DL (ref 0.6–1.1)
DEPRECATED RDW RBC AUTO: 61.4 FL (ref 37–54)
EOSINOPHIL # BLD AUTO: 0.26 10*3/MM3 (ref 0–0.4)
EOSINOPHIL NFR BLD AUTO: 3.7 % (ref 0.3–6.2)
ERYTHROCYTE [DISTWIDTH] IN BLOOD BY AUTOMATED COUNT: 18.3 % (ref 12.3–15.4)
FERRITIN SERPL-MCNC: 22.1 NG/ML (ref 11–207)
GFR SERPL CREATININE-BSD FRML MDRD: 108 ML/MIN/1.73
GLOBULIN UR ELPH-MCNC: 3.1 GM/DL (ref 1.8–3.5)
GLUCOSE SERPL-MCNC: 108 MG/DL (ref 74–124)
HCT VFR BLD AUTO: 37.7 % (ref 34–46.6)
HGB BLD-MCNC: 11.7 G/DL (ref 12–15.9)
HGB RETIC QN AUTO: 29.2 PG (ref 29.8–36.1)
IMM GRANULOCYTES # BLD AUTO: 0.02 10*3/MM3 (ref 0–0.05)
IMM GRANULOCYTES NFR BLD AUTO: 0.3 % (ref 0–0.5)
IMM RETICS NFR: 22.6 % (ref 3–15.8)
IRON 24H UR-MRATE: 114 MCG/DL (ref 37–145)
IRON SATN MFR SERPL: 24 % (ref 14–48)
LYMPHOCYTES # BLD AUTO: 0.79 10*3/MM3 (ref 0.7–3.1)
LYMPHOCYTES NFR BLD AUTO: 11.3 % (ref 19.6–45.3)
MCH RBC QN AUTO: 28.6 PG (ref 26.6–33)
MCHC RBC AUTO-ENTMCNC: 31 G/DL (ref 31.5–35.7)
MCV RBC AUTO: 92.2 FL (ref 79–97)
MONOCYTES # BLD AUTO: 0.49 10*3/MM3 (ref 0.1–0.9)
MONOCYTES NFR BLD AUTO: 7 % (ref 5–12)
NEUTROPHILS NFR BLD AUTO: 5.4 10*3/MM3 (ref 1.7–7)
NEUTROPHILS NFR BLD AUTO: 77.3 % (ref 42.7–76)
NRBC BLD AUTO-RTO: 0 /100 WBC (ref 0–0.2)
PLATELET # BLD AUTO: 293 10*3/MM3 (ref 140–450)
PMV BLD AUTO: 9.4 FL (ref 6–12)
POTASSIUM SERPL-SCNC: 3.9 MMOL/L (ref 3.5–4.7)
PROT SERPL-MCNC: 7.3 G/DL (ref 6.3–8)
RBC # BLD AUTO: 4.09 10*6/MM3 (ref 3.77–5.28)
RETICS # AUTO: 0.08 10*6/MM3 (ref 0.02–0.13)
RETICS/RBC NFR AUTO: 1.87 % (ref 0.7–1.9)
SODIUM SERPL-SCNC: 138 MMOL/L (ref 134–145)
TIBC SERPL-MCNC: 483 MCG/DL (ref 249–505)
TRANSFERRIN SERPL-MCNC: 345 MG/DL (ref 200–360)
VIT B12 BLD-MCNC: 224 PG/ML (ref 211–946)
WBC # BLD AUTO: 6.99 10*3/MM3 (ref 3.4–10.8)

## 2020-10-01 PROCEDURE — 82607 VITAMIN B-12: CPT | Performed by: INTERNAL MEDICINE

## 2020-10-01 PROCEDURE — 99213 OFFICE O/P EST LOW 20 MIN: CPT | Performed by: NURSE PRACTITIONER

## 2020-10-01 PROCEDURE — 80053 COMPREHEN METABOLIC PANEL: CPT

## 2020-10-01 PROCEDURE — 82728 ASSAY OF FERRITIN: CPT

## 2020-10-01 PROCEDURE — 83540 ASSAY OF IRON: CPT

## 2020-10-01 PROCEDURE — 85046 RETICYTE/HGB CONCENTRATE: CPT

## 2020-10-01 PROCEDURE — 85025 COMPLETE CBC W/AUTO DIFF WBC: CPT

## 2020-10-01 PROCEDURE — 84466 ASSAY OF TRANSFERRIN: CPT

## 2020-10-01 PROCEDURE — 36415 COLL VENOUS BLD VENIPUNCTURE: CPT

## 2020-10-01 NOTE — PROGRESS NOTES
Call rec from Merly SCOTT NP-she is seeing this pt today as a hospital follow up. She was started on Eliquis in the hospital and has no insurance.    I have asked [facility] to provide 4 boxes of the Eliquis 5 mg.    I have sent a message to GERA Kidd to assist with B[employer] application since pt has no insurance.

## 2020-10-01 NOTE — PROGRESS NOTES
Site of Appt/Pickup: (n[facility] [place]; [facility] (n[facility] [place];    Prescriber (sheila) contacted pharmacy to obtain the medication below.    NDC:  none  Drug name: eliquis  Strength: 5mg  Total Quantity:  56 (Containers- 4 X Units per Containers- 14)  Lot#:  [id number]  Expiration: 6/22    Prescriber or staff member who picked up medication enio

## 2020-10-02 LAB
FOLATE BLD-MCNC: 225 NG/ML
FOLATE RBC-MCNC: 632 NG/ML
HCT VFR BLD AUTO: 35.6 % (ref 34–46.6)

## 2020-10-02 NOTE — PROGRESS NOTES
Subjective     DVT LE DUE TO IMMOBILITY PARALYZED LLE AND MINIMAL MOBILITY R LE DUE TO SPINA BIFIDA.  Initiated Eliquis    10/1/2020 scheduled hospital return with good tolerance of Eliquis.  Patient does not have insurance and we will try to accommodate this is much as possible with samples.  She did test positive for heterozygous factor V Leiden        HISTORY OF PRESENT ILLNESS:     History of Present Illness Mrs. Lopez is a 50-year-old female with the above medical history here today for scheduled hospital return after recent mission for DVT.  She ultimately did positive for heterozygous factor V Leiden, this in combination with mobility secondary to spina bifida a role in her DVT and likelihood of future DVTs.    She has been taking Eliquis twice daily with good tolerance.  She is very concerned about her finances that she does not have insurance.  Her first prescription, the starter kit, of Eliquis cost $700.  Is asking about an alternative medication.    She denies new symptoms such as bleeding, bruising, swelling, shortness of breath.  She actually reports feeling quite well.          Past Medical History, Past Surgical History, Social History, Family History have been reviewed and are without significant changes except as mentioned.    Review of Systems   HENT: Negative.    Respiratory: Negative.    Cardiovascular: Negative.    Gastrointestinal: Negative.    Genitourinary: Negative.    Musculoskeletal: Positive for gait problem (stable).   Hematological: Negative.    Psychiatric/Behavioral: Negative.       A comprehensive 14 point review of systems was performed and was negative except as mentioned.    Medications:  The current medication list was reviewed in the EMR    ALLERGIES:  No Known Allergies    Objective      Vitals:    10/01/20 1516   BP: 152/93   Pulse: 94   Resp: 20   Temp: 97.9 °F (36.6 °C)   TempSrc: Temporal   SpO2: 99%   Weight: Comment: unable to weight patient in wheel chair fo spina  "bifida   Height: 147.3 cm (57.99\")   PainSc: 0-No pain     Current Status 10/1/2020   ECOG score 3       Physical Exam   Constitutional: She is oriented to person, place, and time.   She is seated in a rise to scooter, she is wearing a facemask, she is well-developed, well-nourished   Eyes: No scleral icterus.   Neck: No JVD present. No tracheal deviation present. No thyromegaly present.   Cardiovascular: Normal rate.   Pulmonary/Chest: Effort normal.   Musculoskeletal: Deformity present. No tenderness.   Lymphadenopathy:     She has no cervical adenopathy.   Neurological: She is alert and oriented to person, place, and time.         RECENT LABS:  Hematology WBC   Date Value Ref Range Status   10/01/2020 6.99 3.40 - 10.80 10*3/mm3 Final     RBC   Date Value Ref Range Status   10/01/2020 4.09 3.77 - 5.28 10*6/mm3 Final     Hemoglobin   Date Value Ref Range Status   10/01/2020 11.7 (L) 12.0 - 15.9 g/dL Final     Hematocrit   Date Value Ref Range Status   10/01/2020 37.7 34.0 - 46.6 % Final     Platelets   Date Value Ref Range Status   10/01/2020 293 140 - 450 10*3/mm3 Final      Iron Profile   Order: [id number]   Status:  Final result   Visible to patient:  No (Not Released) Next appt:  None   Specimen Information: Blood        Component  Ref Range & Units 1d ago   Iron  37 - 145 mcg/dL 27Low     Iron Saturation  20 - 50 % 6Low     Transferrin  200 - 360 mg/dL 283    TIBC  298 - 536 mcg/dL 422    Resulting Agency  [facility]         Specimen Collected: 08/29/20 05:14 Last Resulted: 08/29/20 10:19           Ferritin   Order: [id number]   Status:  Final result   Visible to patient:  No (Not Released) Next appt:  None   Specimen Information: Blood        Component  Ref Range & Units 1d ago   Ferritin  13.00 - 150.00 ng/mL 34.20    Resulting Agency  [facility]      Narrative   Performed by:  [facility]   Results may be falsely decreased if patient taking Biotin.      Specimen Collected: 08/29/20 05:14 Last Resulted: 08/29/20 " 10:21           Contains abnormal data Retic With IRF & RET-He   Order: [id number]   Status:  Final result   Visible to patient:  No (Not Released) Next appt:  None   Specimen Information: Blood        Component  Ref Range & Units 1d ago   Immature Reticulocyte Fraction  3.0 - 15.8 % 28.3High     Reticulocyte %  0.70 - 1.90 % 2.17High     Reticulocyte Absolute  0.0200 - 0.1300 10*6/mm3 0.0875    Reticulocyte Hgb  29.8 - 36.1 pg 32.9    Resulting Agency  [facility]         Specimen Collected: 08/29/20 11:40 Last Resulted: 08/29/20 12:01             Component  Ref Range & Units 1d ago   Folate  4.78 - 24.20 ng/mL 3.20Low        Narrative     Results may be falsely increased if patient taking Biotin.         Related Result Highlights         Vitamin B12  Final result 8/29/2020                  Vitamin B12   Order: [id number]   Collected:  8/29/2020 11:40   Specimen Information: Blood        View Full Report  Component  Ref Range & Units 1d ago   Vitamin B-12  211 - 946 pg/mL 279       Narrative               Assessment/Plan      1.  DVT of the lower extremity, is the result of immobility due to spina bifida and positive heterozygous factor V Leiden.    Currently on Eliquis 5 mg twice daily with good tolerance.  I have discussed her case with Angélica Calix who has arranged for ocular samples to accommodate Mrs. Lopez current financial situation.  She does not have insurance at this time.  She is very concerned about frequent office visits and the charges these will impose.    I have asked that she return in 3 months for formal hospital follow-up with Dr. Carrasquillo beyond that, should she continue to tolerate Eliquis quite well we will space her visits out further.  Angélica feels that we can supply her with monthly samples and we will need to monitor her charges for this in terms of office visits.    We have again reviewed side effects of Eliquis, signs and symptoms for which she needs to seek medical attention.  She  verbalized understanding.  I have asked the patient to call the office with new or worsening symptoms before neck scheduled visit.              10/2/2020      CC:

## 2020-10-05 ENCOUNTER — TELEPHONE (OUTPATIENT)
Dept: ONCOLOGY | Facility: CLINIC | Age: 50
End: 2020-10-05

## 2020-10-05 NOTE — TELEPHONE ENCOUNTER
CSW contacted patient to provide education regarding financial assistance with Eliquis.  CSW emailed the patient her portion of the application and will submit after she returns it along with her most recent tax return.

## 2020-10-26 ENCOUNTER — DOCUMENTATION (OUTPATIENT)
Dept: ONCOLOGY | Facility: CLINIC | Age: 50
End: 2020-10-26

## 2020-10-26 NOTE — PROGRESS NOTES
HOPEW faxed a completed application to [employer] patient assistance foundation for Eliquis Assistance and received fax confirmation.

## 2020-10-28 ENCOUNTER — DOCUMENTATION (OUTPATIENT)
Dept: ONCOLOGY | Facility: CLINIC | Age: 50
End: 2020-10-28

## 2020-10-28 NOTE — PROGRESS NOTES
CSW contacted [employer] patient assistance to follow up on patient's application for free Eliquis.  Patient has been approved from 10/27/2020-10/27/2021.  Patient is approved for a 30 day supply and will receive shipments from [employer] ([phone]).  Patient's Case ID# [id number].      All of the above was communicated to the patient.

## 2020-12-15 ENCOUNTER — TELEPHONE (OUTPATIENT)
Dept: ONCOLOGY | Facility: CLINIC | Age: 50
End: 2020-12-15

## 2020-12-15 NOTE — TELEPHONE ENCOUNTER
Caller: Angélica    Relationship to patient: Pt    Best call back number: [phone]    Type of visit: Lab and follow up     Requested date: Prefers Monday or Wednesday, Prefers after 1pm    If rescheduling, when is the original appointment:12/31    Additional notes: HUB unable to find opening

## 2020-12-31 ENCOUNTER — APPOINTMENT (OUTPATIENT)
Dept: LAB | Facility: HOSPITAL | Age: 50
End: 2020-12-31

## 2021-01-06 ENCOUNTER — TELEPHONE (OUTPATIENT)
Dept: ONCOLOGY | Facility: CLINIC | Age: 51
End: 2021-01-06

## 2021-01-06 NOTE — TELEPHONE ENCOUNTER
Provider: JAZMIN  Caller: LUCIEN SIDDIQUI  Relationship to Patient: SELF    Phone Number: [phone]  Reason for Call: HAVING A LOT OF VAGINAL BLEEDING AND CLOTS  When was the patient last seen: 10/1/20  When did it start: FIRST OF THE MONTH    PT WANTS TO KNOW IF SHE NEEDS TO COME IN FOR THIS, STATES SHE WILL SEE APRN IF SHE NEEDS TO, PREFERS AFTERNOON APT, BUT WILL TAKE FIRST AVAILABLE.

## 2021-01-06 NOTE — TELEPHONE ENCOUNTER
Returning pt's call. Pt stated that he has been having abnormal vaginal bleeding with clotting. Pt stated she wears briefs for incontinence and she is soaking her briefs every 3-4 hours. Pt stated she had a blood clot back in August and saw Dr. Carrasquillo in the hospital. Pt was started on eliquis and f/u with NP in the office in October. Pt stated she has a f/u at the end of January with Dr. Carrasquillo, but feels like she needs to be seen sooner than that. Pt stated her normal flow is about 2 days long per month and this heavy flow has lasted about 6 days now. Pt is not experiencing any signs of fatigue, light headedness, or dizziness, but would like to be seen. Told pt I would speak with an NP to see if we could get her in and give her a call back pt v/u.     Reviewed with Christel MOBLEY, who will review with Dr. Carrasquillo.     Per Christel, Dr. Carrasquillo said to have pt try splitting her 5 mg pills in half and taking 2.5 mg twice daily. And that when she is due for a refill that we will send in 2.5 mg tablets and shell start taking 2 a day.    Called pt to update her on what Dr. Carrasquillo said. Pt v/u. Told pt to cut her pills in half and take a half in the morning and a half in the evening starting tomorrow since she already took today's dose. Pt v/u. Told pt if the heavy bleeding persists she can give us a call back next week. Pt v/u.

## 2021-01-20 ENCOUNTER — OFFICE VISIT (OUTPATIENT)
Dept: FAMILY MEDICINE CLINIC | Facility: CLINIC | Age: 51
End: 2021-01-20

## 2021-01-20 VITALS
HEIGHT: 58 IN | BODY MASS INDEX: 39.87 KG/M2 | OXYGEN SATURATION: 97 % | DIASTOLIC BLOOD PRESSURE: 72 MMHG | HEART RATE: 75 BPM | SYSTOLIC BLOOD PRESSURE: 120 MMHG | TEMPERATURE: 97.3 F

## 2021-01-20 DIAGNOSIS — Z00.00 PHYSICAL EXAM, ANNUAL: Primary | ICD-10-CM

## 2021-01-20 DIAGNOSIS — I82.4Y3 DVT, LOWER EXTREMITY, PROXIMAL, ACUTE, BILATERAL (HCC): ICD-10-CM

## 2021-01-20 DIAGNOSIS — Z12.72 VAGINAL PAP SMEAR: ICD-10-CM

## 2021-01-20 DIAGNOSIS — Q05.2 SPINA BIFIDA OF LUMBOSACRAL REGION WITH HYDROCEPHALUS (HCC): ICD-10-CM

## 2021-01-20 DIAGNOSIS — G91.9 HYDROCEPHALUS, UNSPECIFIED TYPE (HCC): ICD-10-CM

## 2021-01-20 PROBLEM — Q05.7: Status: ACTIVE | Noted: 2021-01-20

## 2021-01-20 PROBLEM — Z98.2 S/P VP SHUNT: Status: ACTIVE | Noted: 2021-01-20

## 2021-01-20 PROBLEM — Q03.9 HYDROCEPHALUS, CONGENITAL (HCC): Status: ACTIVE | Noted: 2021-01-20

## 2021-01-20 PROBLEM — D21.9 FIBROIDS: Status: ACTIVE | Noted: 2021-01-20

## 2021-01-20 PROCEDURE — 99386 PREV VISIT NEW AGE 40-64: CPT | Performed by: INTERNAL MEDICINE

## 2021-01-20 NOTE — PROGRESS NOTES
Kylie Lopez is a 50 y.o. female.     Vitals:    01/20/21 1401   BP: 120/72   Pulse: 75   Temp: 97.3 °F (36.3 °C)   SpO2: 97%      Body mass index is 39.87 kg/m².     History of Present Illness   Patient was seen for physical.  Patient's diet and physical activity were discussed at this visit.  Patient does have a history of spina bifida with a  shunt.  Patient is being referred to orthopedic and neurosurgery.    Dictated utilizing Dragon dictation. If there are questions or for further clarification, please contact me.  The following portions of the patient's history were reviewed and updated as appropriate: allergies, current medications, past family history, past medical history, past social history, past surgical history and problem list.    Review of Systems   Constitutional: Negative for fatigue and fever.   HENT: Positive for congestion. Negative for trouble swallowing.    Eyes: Negative for discharge and visual disturbance.   Respiratory: Negative for choking and shortness of breath.    Cardiovascular: Negative for chest pain and palpitations.   Gastrointestinal: Negative for abdominal pain and blood in stool.   Endocrine: Negative.    Genitourinary: Negative for genital sores and hematuria.   Musculoskeletal: Positive for arthralgias, back pain, gait problem and joint swelling.   Skin: Negative for color change, pallor, rash and wound.   Allergic/Immunologic: Positive for environmental allergies. Negative for immunocompromised state.   Neurological: Negative for facial asymmetry and speech difficulty.   Psychiatric/Behavioral: Negative for hallucinations and suicidal ideas.       Objective   Physical Exam  Vitals signs and nursing note reviewed.   Constitutional:       Appearance: Normal appearance. She is well-developed.   HENT:      Head: Normocephalic and atraumatic.      Nose: Nose normal.      Mouth/Throat:      Mouth: Mucous membranes are moist.      Pharynx: Oropharynx is clear.    Eyes:      Extraocular Movements: Extraocular movements intact.      Conjunctiva/sclera: Conjunctivae normal.      Pupils: Pupils are equal, round, and reactive to light.   Neck:      Musculoskeletal: Normal range of motion and neck supple.   Cardiovascular:      Rate and Rhythm: Normal rate and regular rhythm.      Heart sounds: Normal heart sounds. No murmur. No friction rub. No gallop.    Pulmonary:      Effort: Pulmonary effort is normal. No respiratory distress.      Breath sounds: Normal breath sounds. No stridor. No wheezing, rhonchi or rales.   Chest:      Chest wall: No tenderness.   Abdominal:      General: Bowel sounds are normal.      Palpations: Abdomen is soft.   Musculoskeletal: Normal range of motion.         General: Swelling and deformity present.   Skin:     General: Skin is warm and dry.   Neurological:      General: No focal deficit present.      Mental Status: She is alert and oriented to person, place, and time. Mental status is at baseline.      Motor: Weakness present.      Coordination: Coordination abnormal.      Gait: Gait abnormal.   Psychiatric:         Mood and Affect: Mood normal.         Behavior: Behavior normal.         Thought Content: Thought content normal.         Judgment: Judgment normal.         Assessment/Plan #1 physical #2 labs  Problems Addressed this Visit     Coag and Thromboembolic              DVT, lower extremity, proximal, acute, bilateral (CMS/HCC)    Relevant Orders    Ambulatory Referral to Neurosurgery    Ambulatory Referral to Orthopedic Surgery    CBC (No Diff)    Comprehensive Metabolic Panel    Lipid Panel    Vitamin D 25 Hydroxy          Neuro              Spina bifida of lumbosacral region (CMS/HCC)            Other Visit Diagnoses     Physical exam, annual    -  Primary    Relevant Orders    Ambulatory Referral to Neurosurgery    Ambulatory Referral to Orthopedic Surgery    CBC (No Diff)    Comprehensive Metabolic Panel    Lipid Panel    Vitamin D 25  Hydroxy    Hydrocephalus, unspecified type (CMS/HCC)        Relevant Orders    Ambulatory Referral to Neurosurgery    Ambulatory Referral to Orthopedic Surgery    CBC (No Diff)    Comprehensive Metabolic Panel    Lipid Panel    Vitamin D 25 Hydroxy    Vaginal Pap smear        Relevant Orders    Ambulatory Referral to Obstetrics / Gynecology (Completed)      Diagnoses     Diagnosis Codes Comments    Physical exam, annual    -  Primary ICD-10-CM: Z00.00  ICD-9-CM: V70.0     DVT, lower extremity, proximal, acute, bilateral (CMS/HCC)     ICD-10-CM: I82.4Y3  ICD-9-CM: 453.41     Hydrocephalus, unspecified type (CMS/HCC)     ICD-10-CM: G91.9  ICD-9-CM: 331.4     Vaginal Pap smear     ICD-10-CM: Z12.72  ICD-9-CM: V76.47     Spina bifida of lumbosacral region with hydrocephalus (CMS/HCC)     ICD-10-CM: Q05.2  ICD-9-CM: 741.03

## 2021-01-21 ENCOUNTER — TELEPHONE (OUTPATIENT)
Dept: FAMILY MEDICINE CLINIC | Facility: CLINIC | Age: 51
End: 2021-01-21

## 2021-01-21 DIAGNOSIS — M25.561 PAIN IN BOTH KNEES, UNSPECIFIED CHRONICITY: Primary | ICD-10-CM

## 2021-01-21 DIAGNOSIS — M25.562 PAIN IN BOTH KNEES, UNSPECIFIED CHRONICITY: Primary | ICD-10-CM

## 2021-01-21 NOTE — TELEPHONE ENCOUNTER
Caller: Angélica Lopez    Relationship to patient: Self    Best call back number: [phone]    Patient is needing: PATIENT SAID THAT DURING HER APPOINTMENT WITH DR. RIOS THAT HE WAS GOING TO SEND A SCRIPT FOR BRACE REPAIR.     PATIENT ASKED DR. RIOS TO SEND SCRIPT TO REPAIR HER BRACE TO  [facility] ON [address]

## 2021-01-25 ENCOUNTER — TELEPHONE (OUTPATIENT)
Dept: ONCOLOGY | Facility: CLINIC | Age: 51
End: 2021-01-25

## 2021-01-25 NOTE — TELEPHONE ENCOUNTER
Pt santiago appt 1/27 for lab and follow up.    Her PCP (Dr. Keenan) has ordered labs.  Can she get these labs drawn while she is here?    She has the orders with her.    [phone]

## 2021-01-25 NOTE — TELEPHONE ENCOUNTER
Returning call to pt. Pt stated she has a lab appt and f/u on 1/27 and her PCP had ordered labs and she was wondering if she could have them drawn at her lab appt, pt has printed orders from PCP. Told pt as long as she brings her printed orders with her to her lab appt they will be able to draw the labs. Pt v/u.

## 2021-01-27 ENCOUNTER — OFFICE VISIT (OUTPATIENT)
Dept: ONCOLOGY | Facility: CLINIC | Age: 51
End: 2021-01-27

## 2021-01-27 ENCOUNTER — LAB (OUTPATIENT)
Dept: LAB | Facility: HOSPITAL | Age: 51
End: 2021-01-27

## 2021-01-27 VITALS
SYSTOLIC BLOOD PRESSURE: 133 MMHG | RESPIRATION RATE: 16 BRPM | OXYGEN SATURATION: 100 % | HEART RATE: 87 BPM | TEMPERATURE: 98 F | DIASTOLIC BLOOD PRESSURE: 72 MMHG | BODY MASS INDEX: 39.87 KG/M2 | HEIGHT: 58 IN

## 2021-01-27 DIAGNOSIS — Z79.01 CHRONIC ANTICOAGULATION: ICD-10-CM

## 2021-01-27 DIAGNOSIS — Z00.00 PHYSICAL EXAM, ANNUAL: ICD-10-CM

## 2021-01-27 DIAGNOSIS — D50.0 IRON DEFICIENCY ANEMIA DUE TO CHRONIC BLOOD LOSS: ICD-10-CM

## 2021-01-27 DIAGNOSIS — G91.9 HYDROCEPHALUS, UNSPECIFIED TYPE (HCC): ICD-10-CM

## 2021-01-27 DIAGNOSIS — Z79.899 HIGH RISK MEDICATION USE: ICD-10-CM

## 2021-01-27 DIAGNOSIS — D68.51 FACTOR 5 LEIDEN MUTATION, HETEROZYGOUS (HCC): ICD-10-CM

## 2021-01-27 DIAGNOSIS — I82.4Y3 DVT, LOWER EXTREMITY, PROXIMAL, ACUTE, BILATERAL (HCC): ICD-10-CM

## 2021-01-27 DIAGNOSIS — I82.4Y3 DVT, LOWER EXTREMITY, PROXIMAL, ACUTE, BILATERAL (HCC): Primary | ICD-10-CM

## 2021-01-27 LAB
25(OH)D3 SERPL-MCNC: 22.9 NG/ML (ref 30–100)
ALBUMIN SERPL-MCNC: 3.9 G/DL (ref 3.5–5.2)
ALBUMIN/GLOB SERPL: 1.3 G/DL (ref 1.1–2.4)
ALP SERPL-CCNC: 119 U/L (ref 38–116)
ALT SERPL W P-5'-P-CCNC: 11 U/L (ref 0–33)
ANION GAP SERPL CALCULATED.3IONS-SCNC: 11.8 MMOL/L (ref 5–15)
AST SERPL-CCNC: 14 U/L (ref 0–32)
BASOPHILS # BLD AUTO: 0.03 10*3/MM3 (ref 0–0.2)
BASOPHILS NFR BLD AUTO: 0.3 % (ref 0–1.5)
BILIRUB SERPL-MCNC: 0.5 MG/DL (ref 0.2–1.2)
BUN SERPL-MCNC: 7 MG/DL (ref 6–20)
BUN/CREAT SERPL: 13.5 (ref 7.3–30)
CALCIUM SPEC-SCNC: 9.3 MG/DL (ref 8.5–10.2)
CHLORIDE SERPL-SCNC: 105 MMOL/L (ref 98–107)
CHOLEST SERPL-MCNC: 142 MG/DL (ref 0–200)
CO2 SERPL-SCNC: 23.2 MMOL/L (ref 22–29)
CREAT SERPL-MCNC: 0.52 MG/DL (ref 0.6–1.1)
DEPRECATED RDW RBC AUTO: 53.5 FL (ref 37–54)
EOSINOPHIL # BLD AUTO: 0.5 10*3/MM3 (ref 0–0.4)
EOSINOPHIL NFR BLD AUTO: 5.3 % (ref 0.3–6.2)
ERYTHROCYTE [DISTWIDTH] IN BLOOD BY AUTOMATED COUNT: 16.1 % (ref 12.3–15.4)
FERRITIN SERPL-MCNC: 13.4 NG/ML (ref 11–207)
GFR SERPL CREATININE-BSD FRML MDRD: 125 ML/MIN/1.73
GLOBULIN UR ELPH-MCNC: 3.1 GM/DL (ref 1.8–3.5)
GLUCOSE SERPL-MCNC: 99 MG/DL (ref 74–124)
HCT VFR BLD AUTO: 25.2 % (ref 34–46.6)
HDLC SERPL-MCNC: 53 MG/DL (ref 40–60)
HGB BLD-MCNC: 7.3 G/DL (ref 12–15.9)
IMM GRANULOCYTES # BLD AUTO: 0.04 10*3/MM3 (ref 0–0.05)
IMM GRANULOCYTES NFR BLD AUTO: 0.4 % (ref 0–0.5)
IRON 24H UR-MRATE: 19 MCG/DL (ref 37–145)
IRON SATN MFR SERPL: 5 % (ref 14–48)
LDLC SERPL CALC-MCNC: 72 MG/DL (ref 0–100)
LDLC/HDLC SERPL: 1.34 {RATIO}
LYMPHOCYTES # BLD AUTO: 1.16 10*3/MM3 (ref 0.7–3.1)
LYMPHOCYTES NFR BLD AUTO: 12.3 % (ref 19.6–45.3)
MCH RBC QN AUTO: 26 PG (ref 26.6–33)
MCHC RBC AUTO-ENTMCNC: 29 G/DL (ref 31.5–35.7)
MCV RBC AUTO: 89.7 FL (ref 79–97)
MONOCYTES # BLD AUTO: 0.56 10*3/MM3 (ref 0.1–0.9)
MONOCYTES NFR BLD AUTO: 6 % (ref 5–12)
NEUTROPHILS NFR BLD AUTO: 7.12 10*3/MM3 (ref 1.7–7)
NEUTROPHILS NFR BLD AUTO: 75.7 % (ref 42.7–76)
NRBC BLD AUTO-RTO: 0 /100 WBC (ref 0–0.2)
PLATELET # BLD AUTO: 473 10*3/MM3 (ref 140–450)
PMV BLD AUTO: 8.9 FL (ref 6–12)
POTASSIUM SERPL-SCNC: 3.6 MMOL/L (ref 3.5–4.7)
PROT SERPL-MCNC: 7 G/DL (ref 6.3–8)
RBC # BLD AUTO: 2.81 10*6/MM3 (ref 3.77–5.28)
SODIUM SERPL-SCNC: 140 MMOL/L (ref 134–145)
TIBC SERPL-MCNC: 413 MCG/DL (ref 249–505)
TRANSFERRIN SERPL-MCNC: 295 MG/DL (ref 200–360)
TRIGL SERPL-MCNC: 89 MG/DL (ref 0–150)
VLDLC SERPL-MCNC: 17 MG/DL (ref 5–40)
WBC # BLD AUTO: 9.41 10*3/MM3 (ref 3.4–10.8)

## 2021-01-27 PROCEDURE — 99215 OFFICE O/P EST HI 40 MIN: CPT | Performed by: INTERNAL MEDICINE

## 2021-01-27 PROCEDURE — 83540 ASSAY OF IRON: CPT

## 2021-01-27 PROCEDURE — 36415 COLL VENOUS BLD VENIPUNCTURE: CPT

## 2021-01-27 PROCEDURE — 85025 COMPLETE CBC W/AUTO DIFF WBC: CPT

## 2021-01-27 PROCEDURE — 80053 COMPREHEN METABOLIC PANEL: CPT

## 2021-01-27 PROCEDURE — 80061 LIPID PANEL: CPT | Performed by: INTERNAL MEDICINE

## 2021-01-27 PROCEDURE — 84466 ASSAY OF TRANSFERRIN: CPT

## 2021-01-27 PROCEDURE — 82306 VITAMIN D 25 HYDROXY: CPT | Performed by: INTERNAL MEDICINE

## 2021-01-27 PROCEDURE — 82728 ASSAY OF FERRITIN: CPT

## 2021-01-27 NOTE — PROGRESS NOTES
Subjective   DVT LE DUE TO IMMOBILITY PARALYZED LLE AND MINIMAL MOBILITY R LE DUE TO SPINA BIFIDA          History of Present Illness  delightful 50-year-old female who has history of spina bifida. She was born and she has had surgeries for this having paralysis of her left lower extremity and partial movement of her right lower extremity. She gets around on crutches. In any event she has noticed significant degree of swelling in the right lower extremity for the last week or so and she developed some pain and she became concerned about this. She was brought to the emergency room by her family and a Doppler documented a deep vein thrombosis. The patient has been receiving anticoagulants since the admission. She has not had any chest pain, cough, sputum production, shortness of breath, pleuritic pain, hemoptysis. Her appetite has remained good, her weight is stable, her bowel function and urination have remained normal. She has not had any vaginal bleeding. 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 Again the patient gets around with crutches that push her upper body to get around and she uses the minimal mobility of her right leg to propel herself around the situation.      She has no previous history of thrombophilia. She is not taking any hormone replacement therapy or using any anabolic steroids.      She has not had any recent trauma, no recent traveling anywhere.     The patient was further reviewed in the office on 01/17/2021.  She has noticed some fatigue and she was having significant GYN bleeding in the form of metrorrhagia.  This required a dose adjustment of her Eliquis to 2.5 mg twice a day.  She was advised to come and see us and we have documented today very significant anemia and her hemoglobin was 7.5.  The patient states that the bleeding in the  tract was abundant and continues.  She denies any melena, metrorrhagia or hematuria.  She has  "fatigue.  She is tired.  She is able to function.  Her work is now done from home.  Her appetite and weight are stable.  No cardiovascular issues.  The spina bifida and all implications of this remain unchanged, requiring crutches to mobilize herself from the house to get to the car to our building.        Past Medical History:   Diagnosis Date   • DVT (deep venous thrombosis) (CMS/HCC)     RLE   • Fibroids    • Paralysis of left lower extremity (CMS/HCC)     with minimal mobility to RLE   • Spina bifida (CMS/HCC)      Past Surgical History:   Procedure Laterality Date   • LEG SURGERY     • SPINE SURGERY      childhood   • UTERINE FIBROID SURGERY     No family history on file.   Social History     Socioeconomic History   • Marital status: Single     Spouse name: Not on file   • Number of children: Not on file   • Years of education: Not on file   • Highest education level: Not on file   Occupational History     Employer: [employer]   Tobacco Use   • Smoking status: Never Smoker   • Smokeless tobacco: Never Used   Substance and Sexual Activity   • Alcohol use: Yes     Comment: occasionally   • Drug use: Never   • Sexual activity: Defer     Current Outpatient Medications on File Prior to Visit   Medication Sig Dispense Refill   • apixaban (ELIQUIS) 2.5 MG tablet tablet Take 2.5 mg by mouth 2 (Two) Times a Day.     • Prenatal MV-Min-Fe Fum-FA-DHA (PRENATAL 1 PO) Take  by mouth.     • sennosides-docusate (senna-docusate sodium) 8.6-50 MG per tablet Take 1 tablet by mouth Daily.       No current facility-administered medications on file prior to visit.        ALLERGIES:  No Known Allergies    Objective      Vitals:    01/27/21 1605   BP: 133/72   Pulse: 87   Resp: 16   Temp: 98 °F (36.7 °C)   TempSrc: Temporal   SpO2: 100%   Weight: Comment: Unable to stand for weight   Height: 147.3 cm (57.99\")   PainSc: 0-No pain     Current Status 1/27/2021   ECOG score 1     EXAM : I HAVE PERSONALLY REVIEWED THE " HISTORY OF THE PRESENT ILLNESS, PAST MEDICAL HISTORY, FAMILY HISTORY, SOCIAL HISTORY, ALLERGIES, MEDICATIONS STATED ABOVE IN THE OFFICE NOTE FROM TODAY.        GENERAL:  Well-developed, well-nourished  Patient  in no acute distress.   SKIN:  Warm, dry ,NO rashes,NO purpura ,NO petechiae.PALE  HEENT:  Pupils were equal and reactive to light and accomodation, conjunctivae noninjected, no pterygium, normal extraocular movements, normal visual acuity.   NECK:  Supple with good range of motion; no thyromegaly or masses, no JVD or bruits, no cervical adenopathies.No carotid artery pain, no carotid abnormal pulsation , NO arterial dance.  LYMPHATICS:  No cervical, NO supraclavicular, NO axillary,NO epitrochlear , NO inguinal adenopathy.  CARDIAC   normal rate and regular rhythm, without murmur,NO rubs NO S3 NO S4 right or left . Normal femoral, popliteal, pedis, brachial and carotid pulses.  INSPECTION of  breast documented symmetry of the tissue per se and location and size of the nipple,no retractions or inversion of the nipple, normal skin without lesions, no erythema or nodules,no peau d'orange, no prominence of superficial veins or chest wall collateral circulation.PALPATION of the breast documented normal skin turgor, no induration, alteration in local temperature, or pain, no palpable masses or nodules, normal mobility of the tissues,no fixation of the tissue or parenchyma to the chest wall, no alteration at the tail of the breasts or axillas, no adenopathies. Surgical site was well healed.No lymphedema in either extremity.  VASCULAR ARTERIAL: normal carotids,brachial,radial,femoral,popliteal, pedis pulses , no bruits.no paleness or cyanosis, no pain, no edema, no numbness, no gangrene.  VASCULAR VENOUS: no cyanosis, collateral circulation, varicosities, edema, palpable cords, pain, erythema.  ABDOMEN:  Soft, nontender with no hepatomegaly, no splenomegaly,no masses, no ascites, no collateral circulation,no  distention,no [place] sign, no abdominal pain, no inguinal hernias,no umbilical hernia, no abdominal bruits. Normal bowel sounds.  GENITAL: Not  Performed.  EXTREMITIES  AND SPINE:  No clubbing, cyanosis or edema, no deformities or pain .No kyphosis, scoliosis, deformities or pain in spine, ribs or pelvic bone.  NEUROLOGICAL:  Patient was awake, alert, oriented to time, person and place.SPINA BIFIDA USING CART; IMMOBILITY LE      RECENT LABS:  Hematology WBC   Date Value Ref Range Status   01/27/2021 9.41 3.40 - 10.80 10*3/mm3 Final     RBC   Date Value Ref Range Status   01/27/2021 2.81 (L) 3.77 - 5.28 10*6/mm3 Final     Hemoglobin   Date Value Ref Range Status   01/27/2021 7.3 (C) 12.0 - 15.9 g/dL Final     Hematocrit   Date Value Ref Range Status   01/27/2021 25.2 (L) 34.0 - 46.6 % Final     Platelets   Date Value Ref Range Status   01/27/2021 473 (H) 140 - 450 10*3/mm3 Final          Factor 5 Leiden  Order: [id number]  Status:  Final result   Visible to patient:  Yes (MyChart)   Next appt:  02/17/2021 at 01:30 PM in Family Medicine (Christian Keenan MD)  Specimen Information: Blood        Component   Ref Range & Units 5mo ago   Factor V Leiden   Normal HeterozygousAbnormal     [facility]      Narrative  Performed by: [facility]  Factor V Leiden is a specific mutation ([id number]) in the factor V gene that is associated with an increased risk of venous thrombosis. Factor V Leiden is more resistant to inactivation by activated protein C.  As a result, factor V persists in the circulation leading to a mild hyper-   coagulable state.  The Leiden mutation accounts for 90% - 95% of APC resistance.  Factor V Leiden has been reported in patients with deep vein thrombosis, pulmonary embolus,   central retinal vein occlusion, cerebral sinus thrombosis and hepatic vein thrombosis. Other risk factors to be considered in the workup for venous thrombosis include the [id number] mutation in the factor II (prothrombin)  gene, protein S and C deficiency, and antithrombin deficiencies.   Anticardiolipin antibody and lupus anticoagulant analysis may be appropriate for certain patients, as well as homocysteine levels.     The expression of Factor V Leiden thrombophilia is impacted by coexisting genetic thrombophilic disorders, acquired thrombophilic disorders (malignancy, hyperhomocysteinemia, high factor VIII levels), and circumstances including: pregnancy, oral contraceptive use, hormone replacement therapy, selective estrogen receptor modulators, travel, central venous catheters, surgery, transplantation and advanced age.      Specimen Collected: 08/29/20 11:40   Last Resulted: 08/31/20 08:14                   Assessment/Plan    This patient has had thrombophilia in the past when I saw her in consultation at [facility] in August of 2020 when she presented with a deep vein thrombosis. This was very extensive. She was placed on anticoagulants and she was discharged. Thrombophilia labs were documented including the fact that she is factor V Leiden positive, heterozygous, prothrombin gene mutation negative and the rest of the thrombophilia labs were negative. The patient has remained anticoagulated since then. She lacked insurance all through 2020 but now she has insurance and she was willing to come and see us. Today we have documented that the patient has tremendous degree of metrorrhagia recently and this triggered very significant anemia with a hemoglobin of 7.5 today. Her MCV is low. She denies any GI bleeding. Her diet is appropriate. Her weight is stable. She has no abdominal pain and no pelvic pain. Immobility due to her spina bifida remains ongoing and she mobilizes herself around the house with crutches, here with a small cart.     I gave her the report of her heterozygous factor V Leiden mutation and I explained to her that she needs to discuss with family members on both sides of her family, father and mother  who are alive, the need for them to be tested for this. That is very crucial and I asked her to explain to them why it is important.      I advised her that this is a genetic condition that will remain with her for the rest of her life and given her immobility and spina bifida situation is not going to change, I think that she needs to remain on anticoagulants for the rest of her life as well.     I directed also my advice for treatment of her anemia. She is taking a prenatal vitamin and I pointed out to her that she probably needs to take 2 of them a day to see if we can build up her hemoglobin better now that her dose of Eliquis has been adjusted down and see if she bleeds less. I would like for her to return to see us and a nurse and a blood count and a reticulated hemoglobin in 1 month and I would like to review her back in 2 months. If anemia is not corrected with simple methodology, I think she will require IV iron therapy.      I also suggested dietetic changes including eating some red meat and also leafy things like kale, maria luisa greens, spinach, almonds and oats which are rich in iron as well. She does not like liver or things of this nature.      I asked her also if she continues bleeding in the genitourinary tract to give us a call and she will require to be seen by gynecologist. In the past she has had a fibroid surgery and maybe she will require at some point abdominal and pelvis CT scans.     The other issue that I discussed with her is the need for her eventually to be vaccinated for Covid and I asked her to proceed with this as well whenever this becomes available. I find no formal contraindication for her for this.     I did a breast examination today that is normal and this was performed because on the CT scan of the chest while in the hospital documented breast nodularity. Now that the patient has insurance her primary physician will proceed with mammograms. I agree with this completely.                   1/27/2021      CC:

## 2021-02-02 ENCOUNTER — TELEPHONE (OUTPATIENT)
Dept: FAMILY MEDICINE CLINIC | Facility: CLINIC | Age: 51
End: 2021-02-02

## 2021-02-02 NOTE — TELEPHONE ENCOUNTER
Caller: Angélica Lopez    Relationship: Self    Best call back number: [phone]    What orders are you requesting (i.e. lab or imaging): ORTHOPEDIC REFERRAL AND EVALUATION     In what timeframe would the patient need to come in: NEXT MONTH IF POSSIBLE      Additional notes: THE PREVIOUS REFERRAL AND ORDERS WERE CANCELLED FOR HER ORTHOPEDIC EVALUATION AND SHE WOULD LIKE TO KNOW WHY?  SHE FEELS THAT THIS IS NEEDED.  PLEASE ADVISE

## 2021-02-03 DIAGNOSIS — G91.9 HYDROCEPHALUS, UNSPECIFIED TYPE (HCC): Primary | ICD-10-CM

## 2021-02-03 NOTE — TELEPHONE ENCOUNTER
No ortho or sports medicine will see patient for spina bifida needing leg braces recommended neuro. You cancelled and referred to neurosurgeon looks like they are requesting MRI of brain for Hydrocephalus     Note: refferal did you send ct and duplex on 8/28/20

## 2021-02-04 NOTE — TELEPHONE ENCOUNTER
Pt wants a call from the nurse, she has question about referral that the front dest cant answer. Please call patient.

## 2021-02-08 NOTE — TELEPHONE ENCOUNTER
Patient states the problem is inablity to walk  With pain and problem in  Right leg/hip/low back and the hydrocephalus. Do we need a Mri of just the brain or the hip and lower back also. And she wants to know if neurosurgeon can take care of all these problems or what?  You have phone visit with her Wednesday.

## 2021-02-09 NOTE — TELEPHONE ENCOUNTER
Called patient informed referral for neuro surgeon in would wait from him to choose what else needs MRI, she has appt. With for Mri of brain so we can get her in door with NS.TIM She will has a phone visit tomorrow and will have more questions possibliy.

## 2021-02-17 ENCOUNTER — TELEPHONE (OUTPATIENT)
Dept: OBSTETRICS AND GYNECOLOGY | Facility: CLINIC | Age: 51
End: 2021-02-17

## 2021-02-17 ENCOUNTER — OFFICE VISIT (OUTPATIENT)
Dept: FAMILY MEDICINE CLINIC | Facility: CLINIC | Age: 51
End: 2021-02-17

## 2021-02-17 DIAGNOSIS — I82.4Y3 DVT, LOWER EXTREMITY, PROXIMAL, ACUTE, BILATERAL (HCC): Primary | ICD-10-CM

## 2021-02-17 DIAGNOSIS — D50.0 IRON DEFICIENCY ANEMIA DUE TO CHRONIC BLOOD LOSS: ICD-10-CM

## 2021-02-17 DIAGNOSIS — Q05.2 SPINA BIFIDA OF LUMBOSACRAL REGION WITH HYDROCEPHALUS (HCC): ICD-10-CM

## 2021-02-17 PROCEDURE — 99443 PR PHYS/QHP TELEPHONE EVALUATION 21-30 MIN: CPT | Performed by: INTERNAL MEDICINE

## 2021-02-17 NOTE — TELEPHONE ENCOUNTER
L/m for pt to call to schedule NP appt for annual exam.  Referred by Dr. Christian torres/Gabriele.    Pt # [phone]

## 2021-02-17 NOTE — PROGRESS NOTES
Subjective   Angélica Lopez is a 50 y.o. female.     There were no vitals filed for this visit.   There is no height or weight on file to calculate BMI.     History of Present Illness   You have chosen to receive care through a telephone visit. Do you consent to use a telephone visit for your medical care today? Yes  Patient was seen for a 22-minute phone encounter.  Patient was seen for history of DVT.  Patient has a Leiden factor V deficiency and is using apixaban 2-1/2 mg twice daily.  Patient does have iron deficiency and is using a prenatal vitamin with iron in it.  Patient is being followed by hematology regularly checks her levels.  Patient has a history of spina bifida and is looking for neurosurgeon for follow-up.  Patient was informed that [facility] is the only neurosurgery department in [place] that will see her.  Patient will decide if she wants to see them.  Patient will call me if she wants it set up.    Dictated utilizing Dragon dictation. If there are questions or for further clarification, please contact me.  The following portions of the patient's history were reviewed and updated as appropriate: allergies, current medications, past family history, past medical history, past social history, past surgical history and problem list.    Review of Systems   Constitutional: Negative for fatigue and fever.   HENT: Positive for congestion. Negative for trouble swallowing.    Eyes: Negative for discharge and visual disturbance.   Respiratory: Negative for choking and shortness of breath.    Cardiovascular: Negative for chest pain, palpitations and leg swelling.   Gastrointestinal: Negative for abdominal pain and blood in stool.   Endocrine: Negative.    Genitourinary: Negative for genital sores and hematuria.   Musculoskeletal: Negative for gait problem and joint swelling.   Skin: Negative for color change, pallor, rash and wound.   Allergic/Immunologic: Positive for environmental  allergies. Negative for immunocompromised state.   Neurological: Negative for facial asymmetry and speech difficulty.   Psychiatric/Behavioral: Negative for hallucinations and suicidal ideas.       Objective   Physical Exam  Vitals signs and nursing note reviewed.   Constitutional:       Appearance: Normal appearance. She is well-developed.   HENT:      Head: Normocephalic and atraumatic.      Nose: Nose normal.      Mouth/Throat:      Mouth: Mucous membranes are moist.      Pharynx: Oropharynx is clear.   Eyes:      Extraocular Movements: Extraocular movements intact.      Conjunctiva/sclera: Conjunctivae normal.      Pupils: Pupils are equal, round, and reactive to light.   Neck:      Musculoskeletal: Normal range of motion and neck supple.   Cardiovascular:      Rate and Rhythm: Normal rate and regular rhythm.      Heart sounds: Normal heart sounds. No murmur. No friction rub. No gallop.    Pulmonary:      Effort: Pulmonary effort is normal. No respiratory distress.      Breath sounds: Normal breath sounds. No stridor. No wheezing, rhonchi or rales.   Chest:      Chest wall: No tenderness.   Abdominal:      General: Bowel sounds are normal.      Palpations: Abdomen is soft.   Musculoskeletal: Normal range of motion.      Right lower leg: No edema.      Left lower leg: No edema.   Skin:     General: Skin is warm and dry.   Neurological:      General: No focal deficit present.      Mental Status: She is alert and oriented to person, place, and time. Mental status is at baseline.   Psychiatric:         Mood and Affect: Mood normal.         Behavior: Behavior normal.         Thought Content: Thought content normal.         Judgment: Judgment normal.         Assessment/Plan #1 continue apixaban No. 2 monitor iron levels #3 refer to neurosurgery for evaluation of spina bifida and  shunt.  Problems Addressed this Visit     Coag and Thromboembolic              DVT, lower extremity, proximal, acute, bilateral (CMS/HCC)  - Primary          Hematology and Neoplasia              Iron deficiency anemia due to chronic blood loss          Neuro              Spina bifida of lumbosacral region (CMS/HCC)            Diagnoses     Diagnosis Codes Comments    DVT, lower extremity, proximal, acute, bilateral (CMS/HCC)    -  Primary ICD-10-CM: I82.4Y3  ICD-9-CM: 453.41     Iron deficiency anemia due to chronic blood loss     ICD-10-CM: D50.0  ICD-9-CM: 280.0     Spina bifida of lumbosacral region with hydrocephalus (CMS/HCC)     ICD-10-CM: Q05.2  ICD-9-CM: 741.03

## 2021-02-24 ENCOUNTER — LAB (OUTPATIENT)
Dept: LAB | Facility: HOSPITAL | Age: 51
End: 2021-02-24

## 2021-02-24 ENCOUNTER — CLINICAL SUPPORT (OUTPATIENT)
Dept: ONCOLOGY | Facility: HOSPITAL | Age: 51
End: 2021-02-24

## 2021-02-24 DIAGNOSIS — I82.4Y3 DVT, LOWER EXTREMITY, PROXIMAL, ACUTE, BILATERAL (HCC): ICD-10-CM

## 2021-02-24 DIAGNOSIS — Z79.899 HIGH RISK MEDICATION USE: ICD-10-CM

## 2021-02-24 DIAGNOSIS — Z79.01 CHRONIC ANTICOAGULATION: ICD-10-CM

## 2021-02-24 DIAGNOSIS — D68.51 FACTOR 5 LEIDEN MUTATION, HETEROZYGOUS (HCC): ICD-10-CM

## 2021-02-24 DIAGNOSIS — D50.0 IRON DEFICIENCY ANEMIA DUE TO CHRONIC BLOOD LOSS: ICD-10-CM

## 2021-02-24 LAB
BASOPHILS # BLD AUTO: 0.06 10*3/MM3 (ref 0–0.2)
BASOPHILS NFR BLD AUTO: 0.6 % (ref 0–1.5)
DEPRECATED RDW RBC AUTO: 51.8 FL (ref 37–54)
EOSINOPHIL # BLD AUTO: 0.4 10*3/MM3 (ref 0–0.4)
EOSINOPHIL NFR BLD AUTO: 4.3 % (ref 0.3–6.2)
ERYTHROCYTE [DISTWIDTH] IN BLOOD BY AUTOMATED COUNT: 17.2 % (ref 12.3–15.4)
HCT VFR BLD AUTO: 34.3 % (ref 34–46.6)
HGB BLD-MCNC: 10.4 G/DL (ref 12–15.9)
HGB RETIC QN AUTO: 31.5 PG (ref 29.8–36.1)
IMM GRANULOCYTES # BLD AUTO: 0.05 10*3/MM3 (ref 0–0.05)
IMM GRANULOCYTES NFR BLD AUTO: 0.5 % (ref 0–0.5)
IMM RETICS NFR: 30.6 % (ref 3–15.8)
LYMPHOCYTES # BLD AUTO: 1.22 10*3/MM3 (ref 0.7–3.1)
LYMPHOCYTES NFR BLD AUTO: 13.2 % (ref 19.6–45.3)
MCH RBC QN AUTO: 25.4 PG (ref 26.6–33)
MCHC RBC AUTO-ENTMCNC: 30.3 G/DL (ref 31.5–35.7)
MCV RBC AUTO: 83.7 FL (ref 79–97)
MONOCYTES # BLD AUTO: 0.74 10*3/MM3 (ref 0.1–0.9)
MONOCYTES NFR BLD AUTO: 8 % (ref 5–12)
NEUTROPHILS NFR BLD AUTO: 6.77 10*3/MM3 (ref 1.7–7)
NEUTROPHILS NFR BLD AUTO: 73.4 % (ref 42.7–76)
NRBC BLD AUTO-RTO: 0 /100 WBC (ref 0–0.2)
PLATELET # BLD AUTO: 250 10*3/MM3 (ref 140–450)
PMV BLD AUTO: 11.2 FL (ref 6–12)
RBC # BLD AUTO: 4.1 10*6/MM3 (ref 3.77–5.28)
RETICS # AUTO: 0.12 10*6/MM3 (ref 0.02–0.13)
RETICS/RBC NFR AUTO: 3.03 % (ref 0.7–1.9)
WBC # BLD AUTO: 9.24 10*3/MM3 (ref 3.4–10.8)

## 2021-02-24 PROCEDURE — G0463 HOSPITAL OUTPT CLINIC VISIT: HCPCS

## 2021-02-24 PROCEDURE — 36415 COLL VENOUS BLD VENIPUNCTURE: CPT

## 2021-02-24 PROCEDURE — 85046 RETICYTE/HGB CONCENTRATE: CPT

## 2021-02-24 PROCEDURE — 85025 COMPLETE CBC W/AUTO DIFF WBC: CPT

## 2021-02-24 NOTE — NURSING NOTE
Pt here for CBC with RN review. Hgb significantly improved and is 10.4 today. Pt confirms that she is still taking two prenatal vitamins daily as instructed by Dr. Carrasquillo. Pt states she has more energy and that her bleeding has subsided almost completely. Pt instructed to continue current regimen and to contact clinic with any concerns or increased bleeding. Copy of labs were given to pt. Pt v/u. Message sent to Dr. Carrasquillo updating him on lab work. Pt scheduled to see Dr. Carrasquillo in one month. Pt informed that we will call her if any changes need to be made.       Lab Results   Component Value Date    WBC 9.24 02/24/2021    HGB 10.4 (L) 02/24/2021    HCT 34.3 02/24/2021    MCV 83.7 02/24/2021     02/24/2021

## 2021-02-25 ENCOUNTER — HOSPITAL ENCOUNTER (OUTPATIENT)
Dept: MRI IMAGING | Facility: HOSPITAL | Age: 51
Discharge: HOME | End: 2021-02-25
Admitting: INTERNAL MEDICINE

## 2021-02-25 DIAGNOSIS — G91.9 HYDROCEPHALUS, UNSPECIFIED TYPE (HCC): ICD-10-CM

## 2021-02-25 PROCEDURE — 70551 MRI BRAIN STEM W/O DYE: CPT

## 2021-02-26 DIAGNOSIS — G91.1 OBSTRUCTIVE HYDROCEPHALUS (HCC): Primary | ICD-10-CM

## 2021-03-29 ENCOUNTER — TELEPHONE (OUTPATIENT)
Dept: NEUROSURGERY | Facility: CLINIC | Age: 51
End: 2021-03-29

## 2021-03-29 NOTE — PROGRESS NOTES
"Subjective   Patient ID: Angélica Lopez is a 50 y.o. female is being seen for consultation today at the request of Christian Keenan MD for Hydrocephalus with an MRI Brain t [facility].     Treatment:    Today patient is having gait/balance issues. Patient is R hip/knee pain, low back pain.    Patient, Provider, and MA are all wearing a mask in our office today.     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  Subsequent to that she had some ventriculoperitoneal shunts but the last one was at the age of 6.  She is almost 51 years old.  She had been doing relatively well and can walk with braces.  She has reasonable control of her bowel and bladder.  The only thing that is really changed that she is having more difficulty walking with her braces.  She has no change in her bowel and bladder control she has no headaches and no other symptoms of hydrocephalus.    The following portions of the patient's history were reviewed and updated as appropriate: allergies, current medications, past family history, past medical history, past social history, past surgical history and problem list.    Review of Systems   Constitutional: Negative for chills and fever.   HENT: Negative for congestion.    Genitourinary: Negative for difficulty urinating and dysuria.   Musculoskeletal: Positive for back pain, gait problem and neck stiffness. Negative for neck pain.   Neurological: Negative for weakness, numbness and headaches.       I have reviewed the review of systems as documented by my MA.      Objective     Vitals:    03/30/21 1357   BP: 140/81   Cuff Size: Adult   Pulse: 78   Temp: 98 °F (36.7 °C)   Weight: 86.2 kg (190 lb)   Height: 147.3 cm (57.99\")     Body mass index is 39.72 kg/m².      Physical Exam  Constitutional:       Appearance: She is well-developed.   HENT:      Head: Normocephalic and atraumatic.   Eyes:      Extraocular Movements: EOM normal.      " Conjunctiva/sclera: Conjunctivae normal.      Pupils: Pupils are equal, round, and reactive to light.   Neck:      Vascular: No carotid bruit.   Neurological:      Mental Status: She is oriented to person, place, and time.      Coordination: Finger-Nose-Finger Test and Heel to Shin Test normal.      Deep Tendon Reflexes:      Reflex Scores:       Tricep reflexes are 2+ on the right side and 2+ on the left side.       Bicep reflexes are 2+ on the right side and 2+ on the left side.       Brachioradialis reflexes are 2+ on the right side and 2+ on the left side.       Patellar reflexes are 0 on the right side and 0 on the left side.       Achilles reflexes are 0 on the right side and 0 on the left side.  Psychiatric:         Speech: Speech normal.       Neurologic Exam     Mental Status   Oriented to person, place, and time.   Registration of memory: Good recent and remote memory.   Attention: normal. Concentration: normal.   Speech: speech is normal   Level of consciousness: alert  Knowledge: consistent with education.     Cranial Nerves     CN II   Visual fields full to confrontation.   Visual acuity: normal    CN III, IV, VI   Pupils are equal, round, and reactive to light.  Extraocular motions are normal.     CN V   Facial sensation intact.   Right corneal reflex: normal  Left corneal reflex: normal    CN VII   Facial expression full, symmetric.   Right facial weakness: none  Left facial weakness: none    CN VIII   Hearing: intact    CN IX, X   Palate: symmetric    CN XI   Right sternocleidomastoid strength: normal  Left sternocleidomastoid strength: normal    CN XII   Tongue: not atrophic  Tongue deviation: none    Motor Exam   Muscle bulk: normal  Right arm tone: normal  Left arm tone: normal  Right leg tone: normal  Left leg tone: normal    Strength   Strength 5/5 except as noted.   Right anterior tibial: 0/5  Left anterior tibial: 0/5  Right posterior tibial: 0/5  Left posterior tibial: 0/5  Right peroneal:  0/5  Left peroneal: 0/5  Right gastroc: 0/5  Left gastroc: 0/5    Sensory Exam   Light touch normal.   Sensory deficit distribution on right: L4  Sensory deficit distribution on left: L4    Gait, Coordination, and Reflexes     Coordination   Finger to nose coordination: normal  Heel to shin coordination: normal    Reflexes   Right brachioradialis: 2+  Left brachioradialis: 2+  Right biceps: 2+  Left biceps: 2+  Right triceps: 2+  Left triceps: 2+  Right patellar: 0  Left patellar: 0  Right achilles: 0  Left achilles: 0  Right : 2+  Left : 2+          Assessment/Plan   Independent Review of Radiographic Studies:      I personally reviewed the images from the following studies.    I reviewed her MRI of the brain done on 25 February.  I also looked at a CT of her brain done in 2012.  The MRI shows that the ventricles are clearly bigger than they were in 2012 but there is still good subarachnoid space over the convexities.  There is also good basilar cisterns.    Medical Decision Making:      I told the patient that I really do not think her trouble walking is due to hydrocephalus.  I think that it is more likely due to tethering of her cord.  I looked at her back and she has extensive scar tissue across her lower back.  I do not believe I would be the person to try and help with a tethered cord but we can at least look at it first.  I recommended an MRI of her thoracic and her lumbar spine.  We can call her with the results.    Diagnoses and all orders for this visit:    1. Hydrocephalus, congenital (CMS/HCC) (Primary)    2. Spina bifida of lumbosacral region with hydrocephalus (CMS/HCC)  -     MRI Thoracic Spine With & Without Contrast; Future  -     MRI Lumbar Spine With & Without Contrast; Future      Return for After radiology test.

## 2021-03-30 ENCOUNTER — OFFICE VISIT (OUTPATIENT)
Dept: NEUROSURGERY | Facility: CLINIC | Age: 51
End: 2021-03-30

## 2021-03-30 VITALS
TEMPERATURE: 98 F | SYSTOLIC BLOOD PRESSURE: 140 MMHG | BODY MASS INDEX: 39.88 KG/M2 | HEART RATE: 78 BPM | DIASTOLIC BLOOD PRESSURE: 81 MMHG | HEIGHT: 58 IN | WEIGHT: 190 LBS

## 2021-03-30 DIAGNOSIS — Q03.9 HYDROCEPHALUS, CONGENITAL (HCC): Primary | ICD-10-CM

## 2021-03-30 DIAGNOSIS — Q05.2 SPINA BIFIDA OF LUMBOSACRAL REGION WITH HYDROCEPHALUS (HCC): ICD-10-CM

## 2021-03-30 PROCEDURE — 99204 OFFICE O/P NEW MOD 45 MIN: CPT | Performed by: NEUROLOGICAL SURGERY

## 2021-03-31 ENCOUNTER — LAB (OUTPATIENT)
Dept: LAB | Facility: HOSPITAL | Age: 51
End: 2021-03-31

## 2021-03-31 ENCOUNTER — APPOINTMENT (OUTPATIENT)
Dept: LAB | Facility: HOSPITAL | Age: 51
End: 2021-03-31

## 2021-03-31 ENCOUNTER — OFFICE VISIT (OUTPATIENT)
Dept: ONCOLOGY | Facility: CLINIC | Age: 51
End: 2021-03-31

## 2021-03-31 VITALS
RESPIRATION RATE: 16 BRPM | TEMPERATURE: 99.3 F | BODY MASS INDEX: 39.72 KG/M2 | SYSTOLIC BLOOD PRESSURE: 137 MMHG | OXYGEN SATURATION: 99 % | HEIGHT: 58 IN | HEART RATE: 73 BPM | DIASTOLIC BLOOD PRESSURE: 85 MMHG

## 2021-03-31 DIAGNOSIS — D50.0 IRON DEFICIENCY ANEMIA DUE TO CHRONIC BLOOD LOSS: ICD-10-CM

## 2021-03-31 DIAGNOSIS — I82.4Y3 DVT, LOWER EXTREMITY, PROXIMAL, ACUTE, BILATERAL (HCC): ICD-10-CM

## 2021-03-31 DIAGNOSIS — D68.51 FACTOR 5 LEIDEN MUTATION, HETEROZYGOUS (HCC): ICD-10-CM

## 2021-03-31 DIAGNOSIS — I82.4Y3 DVT, LOWER EXTREMITY, PROXIMAL, ACUTE, BILATERAL (HCC): Primary | ICD-10-CM

## 2021-03-31 DIAGNOSIS — Z79.01 CHRONIC ANTICOAGULATION: ICD-10-CM

## 2021-03-31 DIAGNOSIS — Z79.899 HIGH RISK MEDICATION USE: ICD-10-CM

## 2021-03-31 LAB
BASOPHILS # BLD AUTO: 0.05 10*3/MM3 (ref 0–0.2)
BASOPHILS NFR BLD AUTO: 0.6 % (ref 0–1.5)
DEPRECATED RDW RBC AUTO: 56.2 FL (ref 37–54)
EOSINOPHIL # BLD AUTO: 0.41 10*3/MM3 (ref 0–0.4)
EOSINOPHIL NFR BLD AUTO: 4.5 % (ref 0.3–6.2)
ERYTHROCYTE [DISTWIDTH] IN BLOOD BY AUTOMATED COUNT: 18.1 % (ref 12.3–15.4)
FERRITIN SERPL-MCNC: 37.2 NG/ML (ref 11–207)
HCT VFR BLD AUTO: 41.1 % (ref 34–46.6)
HGB BLD-MCNC: 12.7 G/DL (ref 12–15.9)
HGB RETIC QN AUTO: 32.4 PG (ref 29.8–36.1)
IMM GRANULOCYTES # BLD AUTO: 0.04 10*3/MM3 (ref 0–0.05)
IMM GRANULOCYTES NFR BLD AUTO: 0.4 % (ref 0–0.5)
IMM RETICS NFR: 15.7 % (ref 3–15.8)
IRON 24H UR-MRATE: 158 MCG/DL (ref 37–145)
IRON SATN MFR SERPL: 38 % (ref 14–48)
LYMPHOCYTES # BLD AUTO: 1.06 10*3/MM3 (ref 0.7–3.1)
LYMPHOCYTES NFR BLD AUTO: 11.7 % (ref 19.6–45.3)
MCH RBC QN AUTO: 26.3 PG (ref 26.6–33)
MCHC RBC AUTO-ENTMCNC: 30.9 G/DL (ref 31.5–35.7)
MCV RBC AUTO: 85.3 FL (ref 79–97)
MONOCYTES # BLD AUTO: 0.63 10*3/MM3 (ref 0.1–0.9)
MONOCYTES NFR BLD AUTO: 7 % (ref 5–12)
NEUTROPHILS NFR BLD AUTO: 6.85 10*3/MM3 (ref 1.7–7)
NEUTROPHILS NFR BLD AUTO: 75.8 % (ref 42.7–76)
NRBC BLD AUTO-RTO: 0 /100 WBC (ref 0–0.2)
PLATELET # BLD AUTO: 276 10*3/MM3 (ref 140–450)
PMV BLD AUTO: 9.2 FL (ref 6–12)
RBC # BLD AUTO: 4.82 10*6/MM3 (ref 3.77–5.28)
RETICS # AUTO: 0.13 10*6/MM3 (ref 0.02–0.13)
RETICS/RBC NFR AUTO: 2.65 % (ref 0.7–1.9)
TIBC SERPL-MCNC: 413 MCG/DL (ref 249–505)
TRANSFERRIN SERPL-MCNC: 295 MG/DL (ref 200–360)
WBC # BLD AUTO: 9.04 10*3/MM3 (ref 3.4–10.8)

## 2021-03-31 PROCEDURE — 84466 ASSAY OF TRANSFERRIN: CPT

## 2021-03-31 PROCEDURE — 85046 RETICYTE/HGB CONCENTRATE: CPT

## 2021-03-31 PROCEDURE — 82728 ASSAY OF FERRITIN: CPT

## 2021-03-31 PROCEDURE — 36415 COLL VENOUS BLD VENIPUNCTURE: CPT

## 2021-03-31 PROCEDURE — 85025 COMPLETE CBC W/AUTO DIFF WBC: CPT

## 2021-03-31 PROCEDURE — 83540 ASSAY OF IRON: CPT

## 2021-03-31 PROCEDURE — 99213 OFFICE O/P EST LOW 20 MIN: CPT | Performed by: NURSE PRACTITIONER

## 2021-03-31 NOTE — PROGRESS NOTES
Subjective       REASONS FOR FOLLOW UP  1.  DVT of the left lower extremity due to immobility, paralyzed left lower extremity and minimal mobility due to spina bifida  2.  Factor V Leiden  3. Multifactorial Anemia    History of Present Illness     The patient is a 50 y.o. female with the above-mentioned x-ray, who returns the office today for 2-month follow-up and lab review.  She continues on a reduced dose of Eliquis 2.5 mg twice daily with excellent tolerance.  She denies signs or symptoms of bleeding.  She has monthly menstrual cycles which are manageable without significant bleeding.  She denies recurrent symptoms of thrombosis including worsening lower extremity swelling, shortness of breath or chest pain.  She does continue on a prenatal vitamin for previous anemia.    Past Medical History:   Diagnosis Date   • DVT (deep venous thrombosis) (CMS/HCC)     RLE   • Fibroids    • Paralysis of left lower extremity (CMS/HCC)     with minimal mobility to RLE   • Spina bifida (CMS/HCC)      Past Surgical History:   Procedure Laterality Date   • LEG SURGERY     • SPINE SURGERY      childhood   • UTERINE FIBROID SURGERY     History reviewed. No pertinent family history.   Social History     Socioeconomic History   • Marital status: Single     Spouse name: Not on file   • Number of children: Not on file   • Years of education: Not on file   • Highest education level: Not on file   Tobacco Use   • Smoking status: Never Smoker   • Smokeless tobacco: Never Used   Substance and Sexual Activity   • Alcohol use: Yes     Comment: occasionally   • Drug use: Never   • Sexual activity: Defer     Current Outpatient Medications on File Prior to Visit   Medication Sig Dispense Refill   • apixaban (ELIQUIS) 2.5 MG tablet tablet Take 2.5 mg by mouth 2 (Two) Times a Day.     • Cholecalciferol (vitamin D3) 125 MCG (5000 UT) tablet tablet Take 1 tablet by mouth Daily. 30 tablet 4   • Prenatal MV-Min-Fe Fum-FA-DHA (PRENATAL 1 PO) Take  by  "mouth.     • sennosides-docusate (senna-docusate sodium) 8.6-50 MG per tablet Take 1 tablet by mouth Daily.       No current facility-administered medications on file prior to visit.       ALLERGIES:    Allergies   Allergen Reactions   • Hydrolyzed Silk Hives, Itching, Palpitations and Shortness Of Breath       Objective      Vitals:    03/31/21 1524   BP: 137/85   Pulse: 73   Resp: 16   Temp: 99.3 °F (37.4 °C)   SpO2: 99%   Weight: Comment: pt. unable to weigh   Height: 147.3 cm (57.99\")   PainSc: 0-No pain     Current Status 3/31/2021   ECOG score 1     PHYSICAL EXAM:  GENERAL:  Well-developed, well-nourished in no acute distress.  Seated on a motorized scooter  SKIN:  Warm, dry without rashes, purpura or petechiae.  HEAD:  Normocephalic.  EYES:  Pupils equal, round.  EOMs intact.  Conjunctivae normal.  EARS:  Hearing intact.  NOSE:  Septum midline.  No excoriations or nasal discharge.  CHEST: Breathing unlabored, no acute distress  EXTREMITIES: Braces in place on bilateral lower extremities  NEUROLOGICAL: No focal neurological deficits.  PSYCHIATRIC:  Normal affect and mood.        RECENT LABS:  Results from last 7 days   Lab Units 03/31/21  1458   WBC 10*3/mm3 9.04   NEUTROS ABS 10*3/mm3 6.85   HEMOGLOBIN g/dL 12.7   HEMATOCRIT % 41.1   PLATELETS 10*3/mm3 276     Results from last 7 days   Lab Units 03/31/21  1458   FERRITIN ng/mL 37.20   IRON mcg/dL 158*   TIBC mcg/dL 413           Assessment/Plan      1.  Extensive bilateral DVT  · Initially hospitalized 8/28/2020 through 8/30/2020 with extensive DVT  · Thrombophilia labs documented factor V Leiden heterozygosity  · Prothrombin gene mutation negative, the rest of thrombophilia work-up was negative  · She initiated on Eliquis 5 mg twice daily  · Eliquis was then dose reduced to 2.5 mg twice daily due to anemia in light of bleeding  · She continues with excellent tolerance of Eliquis without signs or symptoms of bleeding    2.  Anemia secondary to " menorrhagia  · Significant with hemoglobin as low as 7.5 with low MCV and heavy menstrual cycles  · This is much improved since dose reduction of her Eliquis to 2.5 mg twice daily  · Continues on a prenatal vitamin  · Hemoglobin improved today 12.7, ferritin 37, reticulocyte count 2.6%, iron saturation 38%      PLAN:    1. Continue Eliquis 2.5 mg twice daily  2. Continue prenatal vitamin  3. Return in 3 months for CBC, CMP, reticulated hemoglobin, MD follow-up with Dr. Foreign Love, APRN   3/31/2021      CC:

## 2021-04-02 ENCOUNTER — BULK ORDERING (OUTPATIENT)
Dept: CASE MANAGEMENT | Facility: OTHER | Age: 51
End: 2021-04-02

## 2021-04-02 DIAGNOSIS — Z23 IMMUNIZATION DUE: ICD-10-CM

## 2021-04-14 ENCOUNTER — OFFICE VISIT (OUTPATIENT)
Dept: OBSTETRICS AND GYNECOLOGY | Facility: CLINIC | Age: 51
End: 2021-04-14

## 2021-04-14 VITALS
BODY MASS INDEX: 29.39 KG/M2 | HEIGHT: 58 IN | WEIGHT: 140 LBS | SYSTOLIC BLOOD PRESSURE: 130 MMHG | DIASTOLIC BLOOD PRESSURE: 80 MMHG

## 2021-04-14 DIAGNOSIS — Z12.31 ENCOUNTER FOR SCREENING MAMMOGRAM FOR MALIGNANT NEOPLASM OF BREAST: ICD-10-CM

## 2021-04-14 DIAGNOSIS — Z12.4 CERVICAL CANCER SCREENING: ICD-10-CM

## 2021-04-14 DIAGNOSIS — D25.9 UTERINE LEIOMYOMA, UNSPECIFIED LOCATION: ICD-10-CM

## 2021-04-14 DIAGNOSIS — N93.9 ABNORMAL UTERINE BLEEDING (AUB): ICD-10-CM

## 2021-04-14 DIAGNOSIS — Z01.419 WELL WOMAN EXAM: Primary | ICD-10-CM

## 2021-04-14 PROCEDURE — 99386 PREV VISIT NEW AGE 40-64: CPT | Performed by: STUDENT IN AN ORGANIZED HEALTH CARE EDUCATION/TRAINING PROGRAM

## 2021-04-14 PROCEDURE — 99213 OFFICE O/P EST LOW 20 MIN: CPT | Performed by: STUDENT IN AN ORGANIZED HEALTH CARE EDUCATION/TRAINING PROGRAM

## 2021-04-14 NOTE — PROGRESS NOTES
GYN Annual Exam     CC- Here for annual exam.     Angélica Lopez is a 50 y.o. female who presents for annual well woman exam.  She reports having irregular periods over the last year. She states they have started to space out somewhat and will vary in amount of flow from heavy to just spotting. She had her Eliquis for treatment of DVT adjusted in 01/2020 and has had consistent heavy menstrual bleeding lasting 3-4 days since that time. LMP 4/9/21. She reports history of uterine fibroids that required myomectomy in 2012 with sizes ranging from 2.5-14 cm per surgical pathology.      OB History    No obstetric history on file.       Not sexually active  Current contraception: abstinence  History of abnormal Pap smear: no  Family history of uterine, colon or ovarian cancer: no  History of abnormal mammogram: no  Family history of breast cancer: no  Last Pap : unsure but reports within last 10 years and reports normal   Last mammogram: unsure but reports within last 10 years and normal    Past Medical History:   Diagnosis Date   • DVT (deep venous thrombosis) (CMS/HCC)     RLE   • Fibroids    • Paralysis of left lower extremity (CMS/HCC)     with minimal mobility to RLE   • Spina bifida (CMS/HCC)        Past Surgical History:   Procedure Laterality Date   • LEG SURGERY     • SPINE SURGERY      childhood   • UTERINE FIBROID SURGERY           Current Outpatient Medications:   •  apixaban (ELIQUIS) 2.5 MG tablet tablet, Take 2.5 mg by mouth 2 (Two) Times a Day., Disp: , Rfl:   •  Cholecalciferol (vitamin D3) 125 MCG (5000 UT) tablet tablet, Take 1 tablet by mouth Daily., Disp: 30 tablet, Rfl: 4  •  Prenatal MV-Min-Fe Fum-FA-DHA (PRENATAL 1 PO), Take  by mouth., Disp: , Rfl:   •  sennosides-docusate (senna-docusate sodium) 8.6-50 MG per tablet, Take 1 tablet by mouth Daily., Disp: , Rfl:     Allergies   Allergen Reactions   • Hydrolyzed Silk Hives, Itching, Palpitations and Shortness Of Breath       Social History  "    Tobacco Use   • Smoking status: Never Smoker   • Smokeless tobacco: Never Used   Substance Use Topics   • Alcohol use: Yes     Comment: occasionally   • Drug use: Never       History reviewed. No pertinent family history.    Review of Systems   Genitourinary: Positive for menstrual problem and pelvic pressure. Negative for breast discharge, breast lump and breast pain.       /80   Ht 147.3 cm (57.99\")   Wt 63.5 kg (140 lb)   LMP 04/09/2021   BMI 29.27 kg/m²     Physical Exam  Vitals reviewed. Exam conducted with a chaperone present.   Constitutional:       Appearance: Normal appearance.   HENT:      Head: Normocephalic and atraumatic.      Right Ear: External ear normal.      Left Ear: External ear normal.   Eyes:      Extraocular Movements: Extraocular movements intact.      Pupils: Pupils are equal, round, and reactive to light.   Cardiovascular:      Rate and Rhythm: Normal rate and regular rhythm.   Pulmonary:      Effort: Pulmonary effort is normal. No respiratory distress.      Breath sounds: No stridor.   Chest:      Breasts: Breasts are symmetrical.         Right: Normal. No swelling, bleeding, inverted nipple, mass, nipple discharge, skin change or tenderness.         Left: Normal. No swelling, bleeding, inverted nipple, mass, nipple discharge, skin change or tenderness.   Abdominal:      General: There is no distension.      Palpations: Abdomen is soft. There is mass.      Tenderness: There is no abdominal tenderness. There is no guarding or rebound.      Hernia: No hernia is present.   Genitourinary:     General: Normal vulva.      Exam position: Lithotomy position.      Labia:         Right: No rash, tenderness, lesion or injury.         Left: No rash, tenderness, lesion or injury.       Urethra: No prolapse or urethral swelling.      Vagina: Normal. No vaginal discharge, erythema, tenderness, bleeding or lesions.      Cervix: Normal.      Uterus: Enlarged. Not tender.       Adnexa: Right " adnexa normal and left adnexa normal.        Right: No mass, tenderness or fullness.          Left: No mass, tenderness or fullness.        Comments: Enlarged fibroid uterus to the level of the umbilicus with pediculated fibroid from left side palpated.    Musculoskeletal:         General: Deformity present. No swelling.      Cervical back: Normal range of motion and neck supple.      Comments: Bilateral leg braces in place.    Lymphadenopathy:      Upper Body:      Right upper body: No axillary adenopathy.      Left upper body: No axillary adenopathy.      Lower Body: No right inguinal adenopathy. No left inguinal adenopathy.   Skin:     General: Skin is warm and dry.   Neurological:      General: No focal deficit present.      Mental Status: She is alert and oriented to person, place, and time.   Psychiatric:         Mood and Affect: Mood normal.         Behavior: Behavior normal.       Assessment     1) GYN annual well woman exam.   2) Cervical cancer screening   3) Breast cancer screening  4) Fibroid uterus  5) Abnormal uterine bleeding        Plan     1) Breast Health - Clinical breast exam yearly, Self breast awareness monthly. Recommended mammogram 1-2 years during 40s and every year during 50s. Mammogram ordered today.   2) Pap - Pap smear with cotesting performed today. Will notify patient of results.   3) Smoking status- Non-smoker   4) Activity recommends - Adult 150-300 min/week of multi-component physical activities that include balance training, aerobic and physical strengthening.    5) Fibroid uterus/Abnormal uterine bleeding- Will obtain pelvic ultrasound today to evaluate location of fibroids given enlargement of uterus to be palpated on abdominal exam. We discussed possible treatment of fibroid uterus and abnormal uterine bleeding including expectant management, medical management with hormones via menstrual suppression with depo provera,progestin only pills, or lupron and surgical options  including uterine artery embolization vs total abdominal hysterectomy. Patient would like to consider her options and will return to clinic if she would like to seek surgical option such as hysterectomy. Would recommend vertical midline approach. Will have to obtain clearance from hematologist and recommendations of anticoagulation given history of DVT. Patient indicated understanding and will contact clinic with how she would like to proceed.        Emani Chapa MD

## 2021-04-16 ENCOUNTER — TELEPHONE (OUTPATIENT)
Dept: OBSTETRICS AND GYNECOLOGY | Facility: CLINIC | Age: 51
End: 2021-04-16

## 2021-04-16 LAB
CYTOLOGIST CVX/VAG CYTO: NORMAL
CYTOLOGY CVX/VAG DOC CYTO: NORMAL
CYTOLOGY CVX/VAG DOC THIN PREP: NORMAL
DX ICD CODE: NORMAL
HIV 1 & 2 AB SER-IMP: NORMAL
HPV I/H RISK 4 DNA CVX QL PROBE+SIG AMP: NEGATIVE
OTHER STN SPEC: NORMAL
STAT OF ADQ CVX/VAG CYTO-IMP: NORMAL

## 2021-04-16 NOTE — TELEPHONE ENCOUNTER
Patient called she said that she is calling back to inform you she has decided to move forward with a partial hysterectomy. And she would like a follow up call on what the next steps are from here.

## 2021-04-21 ENCOUNTER — APPOINTMENT (OUTPATIENT)
Dept: MRI IMAGING | Facility: HOSPITAL | Age: 51
End: 2021-04-21

## 2021-04-21 NOTE — TELEPHONE ENCOUNTER
Left patient a detailed message. If she calls back she just needs to be scheduled for a preoperative appointment (consult) in office.

## 2021-04-28 ENCOUNTER — OFFICE VISIT (OUTPATIENT)
Dept: OBSTETRICS AND GYNECOLOGY | Facility: CLINIC | Age: 51
End: 2021-04-28

## 2021-04-28 VITALS
DIASTOLIC BLOOD PRESSURE: 70 MMHG | BODY MASS INDEX: 29.39 KG/M2 | WEIGHT: 140 LBS | SYSTOLIC BLOOD PRESSURE: 120 MMHG | HEIGHT: 58 IN

## 2021-04-28 DIAGNOSIS — Z79.01 CHRONIC ANTICOAGULATION: ICD-10-CM

## 2021-04-28 DIAGNOSIS — D25.1 INTRAMURAL AND SUBSEROUS LEIOMYOMA OF UTERUS: Primary | ICD-10-CM

## 2021-04-28 DIAGNOSIS — R10.2 PELVIC PRESSURE IN FEMALE: ICD-10-CM

## 2021-04-28 DIAGNOSIS — D25.2 INTRAMURAL AND SUBSEROUS LEIOMYOMA OF UTERUS: Primary | ICD-10-CM

## 2021-04-28 PROCEDURE — 99214 OFFICE O/P EST MOD 30 MIN: CPT | Performed by: STUDENT IN AN ORGANIZED HEALTH CARE EDUCATION/TRAINING PROGRAM

## 2021-05-05 ENCOUNTER — TELEPHONE (OUTPATIENT)
Dept: OBSTETRICS AND GYNECOLOGY | Facility: CLINIC | Age: 51
End: 2021-05-05

## 2021-05-05 NOTE — TELEPHONE ENCOUNTER
Good morning,  Patient is calling to see about her surgery status. Patient stated that she is waiting for a call back from provider about this.       Please advise,  Thank you          Patient was advised that it could take some time 1-3 weeks to hear back from provider if they are having to get with another physician about labs and office visit information. Patient was also advised that if surgery is going to be scheduled that it could take a little time to hear back from surgery scheduling department.    Resume home ace

## 2021-05-07 DIAGNOSIS — I82.4Y3 DVT, LOWER EXTREMITY, PROXIMAL, ACUTE, BILATERAL (HCC): Primary | ICD-10-CM

## 2021-05-07 DIAGNOSIS — D68.51 FACTOR 5 LEIDEN MUTATION, HETEROZYGOUS (HCC): ICD-10-CM

## 2021-05-07 DIAGNOSIS — Z79.01 CHRONIC ANTICOAGULATION: ICD-10-CM

## 2021-05-07 PROBLEM — R10.2 PELVIC PRESSURE IN FEMALE: Status: ACTIVE | Noted: 2021-05-07

## 2021-05-07 PROBLEM — D25.2 INTRAMURAL AND SUBSEROUS LEIOMYOMA OF UTERUS: Status: ACTIVE | Noted: 2021-05-07

## 2021-05-07 PROBLEM — D25.1 INTRAMURAL AND SUBSEROUS LEIOMYOMA OF UTERUS: Status: ACTIVE | Noted: 2021-05-07

## 2021-05-10 ENCOUNTER — TRANSCRIBE ORDERS (OUTPATIENT)
Dept: OBSTETRICS AND GYNECOLOGY | Facility: CLINIC | Age: 51
End: 2021-05-10

## 2021-05-10 ENCOUNTER — OFFICE VISIT (OUTPATIENT)
Dept: ONCOLOGY | Facility: CLINIC | Age: 51
End: 2021-05-10

## 2021-05-10 ENCOUNTER — LAB (OUTPATIENT)
Dept: LAB | Facility: HOSPITAL | Age: 51
End: 2021-05-10

## 2021-05-10 VITALS
HEIGHT: 58 IN | BODY MASS INDEX: 29.27 KG/M2 | TEMPERATURE: 97.7 F | RESPIRATION RATE: 16 BRPM | DIASTOLIC BLOOD PRESSURE: 94 MMHG | OXYGEN SATURATION: 99 % | SYSTOLIC BLOOD PRESSURE: 157 MMHG | HEART RATE: 72 BPM

## 2021-05-10 DIAGNOSIS — Z79.01 CHRONIC ANTICOAGULATION: ICD-10-CM

## 2021-05-10 DIAGNOSIS — D68.51 FACTOR 5 LEIDEN MUTATION, HETEROZYGOUS (HCC): Primary | ICD-10-CM

## 2021-05-10 DIAGNOSIS — I82.4Y3 DVT, LOWER EXTREMITY, PROXIMAL, ACUTE, BILATERAL (HCC): ICD-10-CM

## 2021-05-10 DIAGNOSIS — D25.2 INTRAMURAL AND SUBSEROUS LEIOMYOMA OF UTERUS: Primary | ICD-10-CM

## 2021-05-10 DIAGNOSIS — D68.51 FACTOR 5 LEIDEN MUTATION, HETEROZYGOUS (HCC): ICD-10-CM

## 2021-05-10 DIAGNOSIS — D25.1 INTRAMURAL AND SUBSEROUS LEIOMYOMA OF UTERUS: Primary | ICD-10-CM

## 2021-05-10 LAB
BASOPHILS # BLD AUTO: 0.06 10*3/MM3 (ref 0–0.2)
BASOPHILS NFR BLD AUTO: 0.7 % (ref 0–1.5)
DEPRECATED RDW RBC AUTO: 54.1 FL (ref 37–54)
EOSINOPHIL # BLD AUTO: 0.48 10*3/MM3 (ref 0–0.4)
EOSINOPHIL NFR BLD AUTO: 5.4 % (ref 0.3–6.2)
ERYTHROCYTE [DISTWIDTH] IN BLOOD BY AUTOMATED COUNT: 16.7 % (ref 12.3–15.4)
FERRITIN SERPL-MCNC: 37.7 NG/ML (ref 11–207)
HCT VFR BLD AUTO: 41 % (ref 34–46.6)
HGB BLD-MCNC: 13.3 G/DL (ref 12–15.9)
HGB RETIC QN AUTO: 33.7 PG (ref 29.8–36.1)
IMM GRANULOCYTES # BLD AUTO: 0.04 10*3/MM3 (ref 0–0.05)
IMM GRANULOCYTES NFR BLD AUTO: 0.5 % (ref 0–0.5)
IMM RETICS NFR: 13.3 % (ref 3–15.8)
IRON 24H UR-MRATE: 108 MCG/DL (ref 37–145)
IRON SATN MFR SERPL: 26 % (ref 14–48)
LYMPHOCYTES # BLD AUTO: 0.86 10*3/MM3 (ref 0.7–3.1)
LYMPHOCYTES NFR BLD AUTO: 9.7 % (ref 19.6–45.3)
MCH RBC QN AUTO: 28.8 PG (ref 26.6–33)
MCHC RBC AUTO-ENTMCNC: 32.4 G/DL (ref 31.5–35.7)
MCV RBC AUTO: 88.7 FL (ref 79–97)
MONOCYTES # BLD AUTO: 0.53 10*3/MM3 (ref 0.1–0.9)
MONOCYTES NFR BLD AUTO: 6 % (ref 5–12)
NEUTROPHILS NFR BLD AUTO: 6.89 10*3/MM3 (ref 1.7–7)
NEUTROPHILS NFR BLD AUTO: 77.7 % (ref 42.7–76)
NRBC BLD AUTO-RTO: 0 /100 WBC (ref 0–0.2)
PLATELET # BLD AUTO: 291 10*3/MM3 (ref 140–450)
PMV BLD AUTO: 10.1 FL (ref 6–12)
RBC # BLD AUTO: 4.62 10*6/MM3 (ref 3.77–5.28)
RETICS # AUTO: 0.11 10*6/MM3 (ref 0.02–0.13)
RETICS/RBC NFR AUTO: 2.34 % (ref 0.7–1.9)
TIBC SERPL-MCNC: 416 MCG/DL (ref 249–505)
TRANSFERRIN SERPL-MCNC: 297 MG/DL (ref 200–360)
WBC # BLD AUTO: 8.86 10*3/MM3 (ref 3.4–10.8)

## 2021-05-10 PROCEDURE — 85025 COMPLETE CBC W/AUTO DIFF WBC: CPT

## 2021-05-10 PROCEDURE — 99213 OFFICE O/P EST LOW 20 MIN: CPT | Performed by: NURSE PRACTITIONER

## 2021-05-10 PROCEDURE — 82728 ASSAY OF FERRITIN: CPT

## 2021-05-10 PROCEDURE — 36415 COLL VENOUS BLD VENIPUNCTURE: CPT

## 2021-05-10 PROCEDURE — 83540 ASSAY OF IRON: CPT

## 2021-05-10 PROCEDURE — 84466 ASSAY OF TRANSFERRIN: CPT

## 2021-05-10 PROCEDURE — 85046 RETICYTE/HGB CONCENTRATE: CPT

## 2021-05-10 NOTE — PROGRESS NOTES
Subjective       REASONS FOR FOLLOW UP  1.  DVT of the left lower extremity due to immobility, paralyzed left lower extremity and minimal mobility due to spina bifida  2.  Factor V Leiden  3. Multifactorial Anemia    History of Present Illness     The patient is a 50 y.o. female with the above-mentioned history returns today to obtain surgical clearance prior to total abdominal hysterectomy, bilateral salpingoectomy due to history of extensive uterine fibroids.  She does remain on low-dose Eliquis at 2.5 mg twice daily.    She is generally feeling well.  She is tolerating the Eliquis without any issues.  She denies any significant bleeding or bruising.  No chest pain, shortness of breath, or new lower extremity swelling noted.    She will require withholding anticoagulation prior to her surgery.  This will be further detailed below.    She has no other concerns today.    Past Medical History:   Diagnosis Date   • DVT (deep venous thrombosis) (CMS/HCC)     RLE   • Fibroids    • Paralysis of left lower extremity (CMS/HCC)     with minimal mobility to RLE   • Spina bifida (CMS/HCC)      Past Surgical History:   Procedure Laterality Date   • LEG SURGERY     • SPINE SURGERY      childhood   • UTERINE FIBROID SURGERY     No family history on file.   Social History     Socioeconomic History   • Marital status: Single     Spouse name: Not on file   • Number of children: Not on file   • Years of education: Not on file   • Highest education level: Not on file   Tobacco Use   • Smoking status: Never Smoker   • Smokeless tobacco: Never Used   Substance and Sexual Activity   • Alcohol use: Yes     Comment: occasionally   • Drug use: Never   • Sexual activity: Defer     Current Outpatient Medications on File Prior to Visit   Medication Sig Dispense Refill   • apixaban (ELIQUIS) 2.5 MG tablet tablet Take 2.5 mg by mouth 2 (Two) Times a Day.     • Cholecalciferol (vitamin D3) 125 MCG (5000 UT) tablet tablet Take 1 tablet by mouth  Daily. 30 tablet 4   • Prenatal MV-Min-Fe Fum-FA-DHA (PRENATAL 1 PO) Take  by mouth.     • sennosides-docusate (senna-docusate sodium) 8.6-50 MG per tablet Take 1 tablet by mouth Daily.       No current facility-administered medications on file prior to visit.       ALLERGIES:    Allergies   Allergen Reactions   • Hydrolyzed Silk Hives, Itching, Palpitations and Shortness Of Breath       Objective      There were no vitals filed for this visit.  Current Status 3/31/2021   ECOG score 1     PHYSICAL EXAM:  GENERAL:  Well-developed, well-nourished in no acute distress.  Seated on a motorized scooter  SKIN:  Warm, dry without rashes, purpura or petechiae.  HEAD:  Normocephalic.  EYES:  Pupils equal, round.  EOMs intact.  Conjunctivae normal.  EARS:  Hearing intact.  NOSE:  Septum midline.  No excoriations or nasal discharge.  CHEST: Breathing unlabored, no acute distress  EXTREMITIES: Braces in place on bilateral lower extremities  NEUROLOGICAL: No focal neurological deficits.  PSYCHIATRIC:  Normal affect and mood.    I have reexamined the patient and the results are consistent with the previously documented exam. CRISTA Calixto       RECENT LABS:                Assessment/Plan      1.  Extensive bilateral DVT  · Initially hospitalized 8/28/2020 through 8/30/2020 with extensive DVT  · Thrombophilia labs documented factor V Leiden heterozygosity  · Prothrombin gene mutation negative, the rest of thrombophilia work-up was negative  · She initiated on Eliquis 5 mg twice daily  · Eliquis was then dose reduced to 2.5 mg twice daily due to anemia in light of bleeding  · She continues with excellent tolerance of Eliquis without signs or symptoms of bleeding  · As outlined above, the patient will be undergoing total abdominal hysterectomy per Dr. Chapa.  She remains on low-dose Eliquis at 2.5 mg twice daily.  After review with Dr. Wade in Dr. Carrasquillo' absence, we will plan to withhold anticoagulation for 72 hours prior to  the procedure. From a hematology standpoint, the patient is okay to proceed with surgery.  We will have her take prophylactic Lovenox post procedure, while hospitalized at 40 mg daily.  She will subsequently resume Eliquis at previous dosing of 2.5 mg twice daily once she is discharged    2.  Anemia secondary to menorrhagia  · Significant with hemoglobin as low as 7.5 with low MCV and heavy menstrual cycles  · This is much improved since dose reduction of her Eliquis to 2.5 mg twice daily  · Continues on a prenatal vitamin  · Hemoglobin today is within normal limits at 13.3.  She does have ongoing heavy menstrual cycles and actually just completed a cycle.  Thankfully, her hemoglobin is not reflective of menorrhagia.      PLAN:    1. Continue Eliquis 2.5 mg twice daily.  As noted above, the patient withhold Eliquis 3 days prior to hysterectomy with prophylactic Lovenox at 40 mg daily post procedure.  She may resume the Eliquis at 2.5 mg twice daily at discharge from the hospital.  We will plan to have her follow-up with Dr. Carrasquillo approximately 1 month following hysterectomy in mid July.  2. Continue prenatal vitamin  3. We will have the patient undergo repeat labs with CBC, CMP, reticulated hemoglobin at her follow-up with MD  4. I have asked her to call our office with any new issues or concerns prior to her next visit.  She has verbalized understanding        CRISTA Calixto   5/10/2021      CC:

## 2021-05-12 ENCOUNTER — APPOINTMENT (OUTPATIENT)
Dept: LAB | Facility: HOSPITAL | Age: 51
End: 2021-05-12

## 2021-05-20 ENCOUNTER — APPOINTMENT (OUTPATIENT)
Dept: MRI IMAGING | Facility: HOSPITAL | Age: 51
End: 2021-05-20

## 2021-06-17 ENCOUNTER — TELEPHONE (OUTPATIENT)
Dept: OBSTETRICS AND GYNECOLOGY | Facility: CLINIC | Age: 51
End: 2021-06-17

## 2021-06-21 ENCOUNTER — PRE-ADMISSION TESTING (OUTPATIENT)
Dept: PREADMISSION TESTING | Facility: HOSPITAL | Age: 51
End: 2021-06-21

## 2021-06-21 VITALS
BODY MASS INDEX: 29.39 KG/M2 | DIASTOLIC BLOOD PRESSURE: 94 MMHG | HEIGHT: 58 IN | OXYGEN SATURATION: 98 % | TEMPERATURE: 98 F | RESPIRATION RATE: 16 BRPM | HEART RATE: 89 BPM | WEIGHT: 140 LBS | SYSTOLIC BLOOD PRESSURE: 155 MMHG

## 2021-06-21 LAB
HCG SERPL QL: NEGATIVE
SARS-COV-2 ORF1AB RESP QL NAA+PROBE: NOT DETECTED

## 2021-06-21 PROCEDURE — 80053 COMPREHEN METABOLIC PANEL: CPT | Performed by: STUDENT IN AN ORGANIZED HEALTH CARE EDUCATION/TRAINING PROGRAM

## 2021-06-21 PROCEDURE — 84703 CHORIONIC GONADOTROPIN ASSAY: CPT

## 2021-06-21 PROCEDURE — C9803 HOPD COVID-19 SPEC COLLECT: HCPCS | Performed by: NURSE PRACTITIONER

## 2021-06-21 PROCEDURE — U0004 COV-19 TEST NON-CDC HGH THRU: HCPCS | Performed by: NURSE PRACTITIONER

## 2021-06-21 PROCEDURE — 36415 COLL VENOUS BLD VENIPUNCTURE: CPT

## 2021-06-21 PROCEDURE — 85025 COMPLETE CBC W/AUTO DIFF WBC: CPT | Performed by: STUDENT IN AN ORGANIZED HEALTH CARE EDUCATION/TRAINING PROGRAM

## 2021-06-21 NOTE — DISCHARGE INSTRUCTIONS
Take the following medications the morning of surgery:    NONE    If you are on prescription narcotic pain medication to control your pain you may also take that medication the morning of surgery.    General Instructions:  • Do not eat solid food after midnight the night before surgery.  • You may drink clear liquids day of surgery but must stop at least one hour before your hospital arrival time.  • It is beneficial for you to have a clear drink that contains carbohydrates the day of surgery.  We suggest a 12 to 20 ounce bottle of Gatorade or Powerade for non-diabetic patients     Clear liquids are liquids you can see through.  Nothing red in color.     Plain water                               Sports drinks  Sodas                                   Gelatin (Jell-O)  Fruit juices without pulp such as white grape juice and apple juice  Popsicles that contain no fruit or yogurt  Tea or coffee (no cream or milk added)  Gatorade / Powerade  G2 / Powerade Zero      • Bring any papers given to you in the doctor’s office.  • Wear clean comfortable clothes.  • Do not wear contact lenses, false eyelashes or make-up.  Bring a case for your glasses.   • Remove all piercings.  Leave jewelry and any other valuables at home.  • Hair extensions with metal clips must be removed prior to surgery.  • The Pre-Admission Testing nurse will instruct you to bring medications if unable to obtain an accurate list in Pre-Admission Testing.   • REPORT TO MAIN SURGERY AND THEN OVER TO OUTPATIENT SURGERY ON 6- AT 0530 AM           Preventing a Surgical Site Infection:  • For 2 to 3 days before surgery, avoid shaving with a razor because the razor can irritate skin and make it easier to develop an infection.    • Any areas of open skin can increase the risk of a post-operative wound infection by allowing bacteria to enter and travel throughout the body.  Notify your surgeon if you have any skin wounds / rashes even if it is not near the  expected surgical site.  The area will need assessed to determine if surgery should be delayed until it is healed.  • The night prior to surgery shower using a fresh bar of anti-bacterial soap (such as Dial) and clean washcloth.  Sleep in a clean bed with clean clothing.  Do not allow pets to sleep with you.  • Shower on the morning of surgery using a fresh bar of anti-bacterial soap (such as Dial) and clean washcloth.  Dry with a clean towel and dress in clean clothing.  • Ask your surgeon if you will be receiving antibiotics prior to surgery.  • Make sure you, your family, and all healthcare providers clean their hands with soap and water or an alcohol based hand  before caring for you or your wound.    Day of surgery:  Your arrival time is approximately two hours before your scheduled surgery time.  Upon arrival, a Pre-op nurse and Anesthesiologist will review your health history, obtain vital signs, and answer questions you may have.  The only belongings needed at this time will be a list of your home medications and if applicable your C-PAP/BI-PAP machine.  A Pre-op nurse will start an IV and you may receive medication in preparation for surgery, including something to help you relax.     Please be aware that surgery does come with discomfort.  We want to make every effort to control your discomfort so please discuss any uncontrolled symptoms with your nurse.   Your doctor will most likely have prescribed pain medications.      .    If you are staying overnight following surgery, you will be transported to your hospital room following the recovery period.  [facility] has all private rooms.    If you have any questions please call Pre-Admission Testing at [phone].  Deductibles and co-payments are collected on the day of service. Please be prepared to pay the required co-pay, deductible or deposit on the day of service as defined by your plan.    Patient Education for Self-Quarantine  Process    Following your COVID testing, we strongly recommend that you do not leave your home after you have been tested for COVID except to get medical care. This includes not going to work, school or to public areas.  If this is not possible for you to do please limit your activities to only required outings.  Be sure to wear a mask when you are with other people, practice social distancing and wash your hands frequently.      The following items provide additional details to keep you safe.  • Wash your hands with soap and water frequently for at least 20 seconds.   • Avoid touching your eyes, nose and mouth with unwashed hands.  • Do not share anything - utensils, towels, food from the same bowl.   • Have your own utensils, drinking glass, dishes, towels and bedding.   • Do not have visitors.   • Do use FaceTime to stay in touch with family and friends.  • You should stay in a specific room away from others if possible.   • Stay at least 6 feet away from others in the home if you cannot have a dedicated room to yourself.   • Do not snuggle with your pet. While the CDC says there is no evidence that pets can spread COVID-19 or be infected from humans, it is probably best to avoid “petting, snuggling, being kissed or licked and sharing food (during self-quarantine)”, according to the CDC.   • Sanitize household surfaces daily. Include all high touch areas (door handles, light switches, phones, countertops, etc.)  • Do not share a bathroom with others, if possible.   • Wear a mask around others in your home if you are unable to stay in a separate room or 6 feet apart. If  you are unable to wear a mask, have your family member wear a mask if they must be within 6 feet of you.   Call your surgeon immediately if you experience any of the following symptoms:  • Sore Throat  • Shortness of Breath or difficulty breathing  • Cough  • Chills  • Body soreness or muscle pain  • Headache  • Fever  • New loss of taste or  smell  • Do not arrive for your surgery ill.  Your procedure will need to be rescheduled to another time.  You will need to call your physician before the day of surgery to avoid any unnecessary exposure to hospital staff as well as other patients.

## 2021-06-23 ENCOUNTER — HOSPITAL ENCOUNTER (INPATIENT)
Facility: HOSPITAL | Age: 51
LOS: 7 days | Discharge: HOME | End: 2021-06-30
Attending: STUDENT IN AN ORGANIZED HEALTH CARE EDUCATION/TRAINING PROGRAM | Admitting: STUDENT IN AN ORGANIZED HEALTH CARE EDUCATION/TRAINING PROGRAM

## 2021-06-23 ENCOUNTER — ANESTHESIA EVENT (OUTPATIENT)
Dept: PERIOP | Facility: HOSPITAL | Age: 51
End: 2021-06-23

## 2021-06-23 ENCOUNTER — ANESTHESIA (OUTPATIENT)
Dept: PERIOP | Facility: HOSPITAL | Age: 51
End: 2021-06-23

## 2021-06-23 DIAGNOSIS — R10.2 PELVIC PRESSURE IN FEMALE: ICD-10-CM

## 2021-06-23 DIAGNOSIS — D25.2 INTRAMURAL AND SUBSEROUS LEIOMYOMA OF UTERUS: ICD-10-CM

## 2021-06-23 DIAGNOSIS — D25.1 INTRAMURAL AND SUBSEROUS LEIOMYOMA OF UTERUS: ICD-10-CM

## 2021-06-23 LAB
ABO GROUP BLD: NORMAL
BLD GP AB SCN SERPL QL: NEGATIVE
HCT VFR BLD AUTO: 39.3 % (ref 34–46.6)
HGB BLD-MCNC: 12.8 G/DL (ref 12–15.9)
RH BLD: POSITIVE
T&S EXPIRATION DATE: NORMAL

## 2021-06-23 PROCEDURE — 0UT00ZZ RESECTION OF RIGHT OVARY, OPEN APPROACH: ICD-10-PCS | Performed by: STUDENT IN AN ORGANIZED HEALTH CARE EDUCATION/TRAINING PROGRAM

## 2021-06-23 PROCEDURE — 86850 RBC ANTIBODY SCREEN: CPT | Performed by: STUDENT IN AN ORGANIZED HEALTH CARE EDUCATION/TRAINING PROGRAM

## 2021-06-23 PROCEDURE — 25010000003 CEFAZOLIN IN DEXTROSE 2-4 GM/100ML-% SOLUTION: Performed by: STUDENT IN AN ORGANIZED HEALTH CARE EDUCATION/TRAINING PROGRAM

## 2021-06-23 PROCEDURE — C1889 IMPLANT/INSERT DEVICE, NOC: HCPCS | Performed by: STUDENT IN AN ORGANIZED HEALTH CARE EDUCATION/TRAINING PROGRAM

## 2021-06-23 PROCEDURE — 25010000002 HYDROMORPHONE PER 4 MG: Performed by: NURSE ANESTHETIST, CERTIFIED REGISTERED

## 2021-06-23 PROCEDURE — 25010000002 PROPOFOL 10 MG/ML EMULSION: Performed by: NURSE ANESTHETIST, CERTIFIED REGISTERED

## 2021-06-23 PROCEDURE — 0DN80ZZ RELEASE SMALL INTESTINE, OPEN APPROACH: ICD-10-PCS | Performed by: STUDENT IN AN ORGANIZED HEALTH CARE EDUCATION/TRAINING PROGRAM

## 2021-06-23 PROCEDURE — 25010000002 FENTANYL CITRATE (PF) 50 MCG/ML SOLUTION: Performed by: ANESTHESIOLOGY

## 2021-06-23 PROCEDURE — 25010000002 HYDROMORPHONE PER 4 MG: Performed by: ANESTHESIOLOGY

## 2021-06-23 PROCEDURE — 0DNW0ZZ RELEASE PERITONEUM, OPEN APPROACH: ICD-10-PCS | Performed by: STUDENT IN AN ORGANIZED HEALTH CARE EDUCATION/TRAINING PROGRAM

## 2021-06-23 PROCEDURE — C1765 ADHESION BARRIER: HCPCS | Performed by: STUDENT IN AN ORGANIZED HEALTH CARE EDUCATION/TRAINING PROGRAM

## 2021-06-23 PROCEDURE — 86901 BLOOD TYPING SEROLOGIC RH(D): CPT | Performed by: STUDENT IN AN ORGANIZED HEALTH CARE EDUCATION/TRAINING PROGRAM

## 2021-06-23 PROCEDURE — 58150 TOTAL HYSTERECTOMY: CPT | Performed by: STUDENT IN AN ORGANIZED HEALTH CARE EDUCATION/TRAINING PROGRAM

## 2021-06-23 PROCEDURE — 25010000002 CEFAZOLIN PER 500 MG: Performed by: NURSE ANESTHETIST, CERTIFIED REGISTERED

## 2021-06-23 PROCEDURE — 88307 TISSUE EXAM BY PATHOLOGIST: CPT | Performed by: STUDENT IN AN ORGANIZED HEALTH CARE EDUCATION/TRAINING PROGRAM

## 2021-06-23 PROCEDURE — 25010000002 NEOSTIGMINE 5 MG/10ML SOLUTION: Performed by: NURSE ANESTHETIST, CERTIFIED REGISTERED

## 2021-06-23 PROCEDURE — 94799 UNLISTED PULMONARY SVC/PX: CPT

## 2021-06-23 PROCEDURE — 25010000002 FENTANYL CITRATE (PF) 50 MCG/ML SOLUTION: Performed by: NURSE ANESTHETIST, CERTIFIED REGISTERED

## 2021-06-23 PROCEDURE — 86900 BLOOD TYPING SEROLOGIC ABO: CPT | Performed by: STUDENT IN AN ORGANIZED HEALTH CARE EDUCATION/TRAINING PROGRAM

## 2021-06-23 PROCEDURE — 85018 HEMOGLOBIN: CPT | Performed by: STUDENT IN AN ORGANIZED HEALTH CARE EDUCATION/TRAINING PROGRAM

## 2021-06-23 PROCEDURE — 0UT90ZZ RESECTION OF UTERUS, OPEN APPROACH: ICD-10-PCS | Performed by: STUDENT IN AN ORGANIZED HEALTH CARE EDUCATION/TRAINING PROGRAM

## 2021-06-23 PROCEDURE — 25010000002 ONDANSETRON PER 1 MG: Performed by: NURSE ANESTHETIST, CERTIFIED REGISTERED

## 2021-06-23 PROCEDURE — 25010000002 HEPARIN (PORCINE) PER 1000 UNITS: Performed by: STUDENT IN AN ORGANIZED HEALTH CARE EDUCATION/TRAINING PROGRAM

## 2021-06-23 PROCEDURE — 0UT70ZZ RESECTION OF BILATERAL FALLOPIAN TUBES, OPEN APPROACH: ICD-10-PCS | Performed by: STUDENT IN AN ORGANIZED HEALTH CARE EDUCATION/TRAINING PROGRAM

## 2021-06-23 PROCEDURE — 25010000003 HYDROMORPHONE HCL PF 50 MG/5ML SOLUTION: Performed by: STUDENT IN AN ORGANIZED HEALTH CARE EDUCATION/TRAINING PROGRAM

## 2021-06-23 PROCEDURE — 85014 HEMATOCRIT: CPT | Performed by: STUDENT IN AN ORGANIZED HEALTH CARE EDUCATION/TRAINING PROGRAM

## 2021-06-23 PROCEDURE — 58150 TOTAL HYSTERECTOMY: CPT | Performed by: OBSTETRICS & GYNECOLOGY

## 2021-06-23 PROCEDURE — S0260 H&P FOR SURGERY: HCPCS | Performed by: STUDENT IN AN ORGANIZED HEALTH CARE EDUCATION/TRAINING PROGRAM

## 2021-06-23 DEVICE — HORIZON TI ML 6 CLIPS/CART
Type: IMPLANTABLE DEVICE | Site: ABDOMEN | Status: FUNCTIONAL
Brand: WECK

## 2021-06-23 DEVICE — FLOSEAL HEMOSTATIC MATRIX, 5ML
Type: IMPLANTABLE DEVICE | Site: ABDOMEN | Status: FUNCTIONAL
Brand: FLOSEAL HEMOSTATIC MATRIX

## 2021-06-23 DEVICE — HORIZON TI MED 6/CART
Type: IMPLANTABLE DEVICE | Site: ABDOMEN | Status: FUNCTIONAL
Brand: WECK

## 2021-06-23 DEVICE — HORIZON TI LG 6 CLIPS/CART
Type: IMPLANTABLE DEVICE | Site: ABDOMEN | Status: FUNCTIONAL
Brand: WECK

## 2021-06-23 RX ORDER — SODIUM CHLORIDE 0.9 % (FLUSH) 0.9 %
3 SYRINGE (ML) INJECTION EVERY 12 HOURS SCHEDULED
Status: DISCONTINUED | OUTPATIENT
Start: 2021-06-23 | End: 2021-06-23 | Stop reason: HOSPADM

## 2021-06-23 RX ORDER — SODIUM CHLORIDE 0.9 % (FLUSH) 0.9 %
3-10 SYRINGE (ML) INJECTION AS NEEDED
Status: DISCONTINUED | OUTPATIENT
Start: 2021-06-23 | End: 2021-06-23 | Stop reason: HOSPADM

## 2021-06-23 RX ORDER — ROCURONIUM BROMIDE 10 MG/ML
INJECTION, SOLUTION INTRAVENOUS AS NEEDED
Status: DISCONTINUED | OUTPATIENT
Start: 2021-06-23 | End: 2021-06-23 | Stop reason: SURG

## 2021-06-23 RX ORDER — MIDAZOLAM HYDROCHLORIDE 1 MG/ML
1 INJECTION INTRAMUSCULAR; INTRAVENOUS
Status: DISCONTINUED | OUTPATIENT
Start: 2021-06-23 | End: 2021-06-23 | Stop reason: HOSPADM

## 2021-06-23 RX ORDER — ACETAMINOPHEN 500 MG
1000 TABLET ORAL EVERY 8 HOURS
Status: DISCONTINUED | OUTPATIENT
Start: 2021-06-23 | End: 2021-06-28

## 2021-06-23 RX ORDER — OXYCODONE AND ACETAMINOPHEN 10; 325 MG/1; MG/1
1 TABLET ORAL EVERY 4 HOURS PRN
Status: DISCONTINUED | OUTPATIENT
Start: 2021-06-23 | End: 2021-06-23 | Stop reason: HOSPADM

## 2021-06-23 RX ORDER — HYDROCODONE BITARTRATE AND ACETAMINOPHEN 7.5; 325 MG/1; MG/1
1 TABLET ORAL ONCE AS NEEDED
Status: COMPLETED | OUTPATIENT
Start: 2021-06-23 | End: 2021-06-23

## 2021-06-23 RX ORDER — FENTANYL CITRATE 50 UG/ML
50 INJECTION, SOLUTION INTRAMUSCULAR; INTRAVENOUS
Status: DISCONTINUED | OUTPATIENT
Start: 2021-06-23 | End: 2021-06-23

## 2021-06-23 RX ORDER — HYDROMORPHONE HCL IN 0.9% NACL 10 MG/50ML
PATIENT CONTROLLED ANALGESIA SYRINGE INTRAVENOUS CONTINUOUS
Status: DISCONTINUED | OUTPATIENT
Start: 2021-06-23 | End: 2021-06-24

## 2021-06-23 RX ORDER — HYDRALAZINE HYDROCHLORIDE 20 MG/ML
5 INJECTION INTRAMUSCULAR; INTRAVENOUS
Status: DISCONTINUED | OUTPATIENT
Start: 2021-06-23 | End: 2021-06-23

## 2021-06-23 RX ORDER — IBUPROFEN 600 MG/1
600 TABLET ORAL ONCE AS NEEDED
Status: DISCONTINUED | OUTPATIENT
Start: 2021-06-23 | End: 2021-06-23 | Stop reason: HOSPADM

## 2021-06-23 RX ORDER — ONDANSETRON 4 MG/1
4 TABLET, FILM COATED ORAL EVERY 6 HOURS PRN
Status: DISCONTINUED | OUTPATIENT
Start: 2021-06-23 | End: 2021-06-30 | Stop reason: HOSPADM

## 2021-06-23 RX ORDER — ONDANSETRON 2 MG/ML
4 INJECTION INTRAMUSCULAR; INTRAVENOUS EVERY 6 HOURS PRN
Status: DISCONTINUED | OUTPATIENT
Start: 2021-06-23 | End: 2021-06-30 | Stop reason: HOSPADM

## 2021-06-23 RX ORDER — HEPARIN SODIUM 5000 [USP'U]/ML
5000 INJECTION, SOLUTION INTRAVENOUS; SUBCUTANEOUS ONCE
Status: COMPLETED | OUTPATIENT
Start: 2021-06-23 | End: 2021-06-23

## 2021-06-23 RX ORDER — GABAPENTIN 300 MG/1
300 CAPSULE ORAL EVERY 8 HOURS SCHEDULED
Status: DISCONTINUED | OUTPATIENT
Start: 2021-06-23 | End: 2021-06-30 | Stop reason: HOSPADM

## 2021-06-23 RX ORDER — HYDROCODONE BITARTRATE AND ACETAMINOPHEN 7.5; 325 MG/1; MG/1
1 TABLET ORAL ONCE AS NEEDED
Status: DISCONTINUED | OUTPATIENT
Start: 2021-06-23 | End: 2021-06-23

## 2021-06-23 RX ORDER — MAGNESIUM HYDROXIDE 1200 MG/15ML
LIQUID ORAL AS NEEDED
Status: DISCONTINUED | OUTPATIENT
Start: 2021-06-23 | End: 2021-06-23 | Stop reason: HOSPADM

## 2021-06-23 RX ORDER — HYDROMORPHONE HYDROCHLORIDE 1 MG/ML
0.5 INJECTION, SOLUTION INTRAMUSCULAR; INTRAVENOUS; SUBCUTANEOUS
Status: DISCONTINUED | OUTPATIENT
Start: 2021-06-23 | End: 2021-06-23 | Stop reason: HOSPADM

## 2021-06-23 RX ORDER — PROMETHAZINE HYDROCHLORIDE 12.5 MG/1
12.5 SUPPOSITORY RECTAL EVERY 6 HOURS PRN
Status: DISCONTINUED | OUTPATIENT
Start: 2021-06-23 | End: 2021-06-30 | Stop reason: HOSPADM

## 2021-06-23 RX ORDER — HYDROMORPHONE HCL 110MG/55ML
PATIENT CONTROLLED ANALGESIA SYRINGE INTRAVENOUS AS NEEDED
Status: DISCONTINUED | OUTPATIENT
Start: 2021-06-23 | End: 2021-06-23 | Stop reason: SURG

## 2021-06-23 RX ORDER — DOCUSATE SODIUM 100 MG/1
100 CAPSULE, LIQUID FILLED ORAL 2 TIMES DAILY PRN
Status: DISCONTINUED | OUTPATIENT
Start: 2021-06-23 | End: 2021-06-30 | Stop reason: HOSPADM

## 2021-06-23 RX ORDER — NALOXONE HCL 0.4 MG/ML
0.2 VIAL (ML) INJECTION AS NEEDED
Status: DISCONTINUED | OUTPATIENT
Start: 2021-06-23 | End: 2021-06-23 | Stop reason: HOSPADM

## 2021-06-23 RX ORDER — DIPHENHYDRAMINE HYDROCHLORIDE 50 MG/ML
12.5 INJECTION INTRAMUSCULAR; INTRAVENOUS
Status: DISCONTINUED | OUTPATIENT
Start: 2021-06-23 | End: 2021-06-23

## 2021-06-23 RX ORDER — LABETALOL HYDROCHLORIDE 5 MG/ML
5 INJECTION, SOLUTION INTRAVENOUS
Status: DISCONTINUED | OUTPATIENT
Start: 2021-06-23 | End: 2021-06-23 | Stop reason: HOSPADM

## 2021-06-23 RX ORDER — PROMETHAZINE HYDROCHLORIDE 25 MG/1
25 TABLET ORAL ONCE AS NEEDED
Status: DISCONTINUED | OUTPATIENT
Start: 2021-06-23 | End: 2021-06-23 | Stop reason: HOSPADM

## 2021-06-23 RX ORDER — LIDOCAINE HYDROCHLORIDE 20 MG/ML
INJECTION, SOLUTION INFILTRATION; PERINEURAL AS NEEDED
Status: DISCONTINUED | OUTPATIENT
Start: 2021-06-23 | End: 2021-06-23 | Stop reason: SURG

## 2021-06-23 RX ORDER — EPHEDRINE SULFATE 50 MG/ML
5 INJECTION, SOLUTION INTRAVENOUS ONCE AS NEEDED
Status: DISCONTINUED | OUTPATIENT
Start: 2021-06-23 | End: 2021-06-23 | Stop reason: HOSPADM

## 2021-06-23 RX ORDER — NALOXONE HCL 0.4 MG/ML
0.2 VIAL (ML) INJECTION AS NEEDED
Status: DISCONTINUED | OUTPATIENT
Start: 2021-06-23 | End: 2021-06-23

## 2021-06-23 RX ORDER — ONDANSETRON 2 MG/ML
INJECTION INTRAMUSCULAR; INTRAVENOUS AS NEEDED
Status: DISCONTINUED | OUTPATIENT
Start: 2021-06-23 | End: 2021-06-23 | Stop reason: SURG

## 2021-06-23 RX ORDER — LIDOCAINE HYDROCHLORIDE 10 MG/ML
0.5 INJECTION, SOLUTION EPIDURAL; INFILTRATION; INTRACAUDAL; PERINEURAL ONCE AS NEEDED
Status: COMPLETED | OUTPATIENT
Start: 2021-06-23 | End: 2021-06-23

## 2021-06-23 RX ORDER — NEOSTIGMINE METHYLSULFATE 0.5 MG/ML
INJECTION, SOLUTION INTRAVENOUS AS NEEDED
Status: DISCONTINUED | OUTPATIENT
Start: 2021-06-23 | End: 2021-06-23 | Stop reason: SURG

## 2021-06-23 RX ORDER — CEFAZOLIN SODIUM 2 G/100ML
2 INJECTION, SOLUTION INTRAVENOUS ONCE
Status: COMPLETED | OUTPATIENT
Start: 2021-06-23 | End: 2021-06-23

## 2021-06-23 RX ORDER — FENTANYL CITRATE 50 UG/ML
50 INJECTION, SOLUTION INTRAMUSCULAR; INTRAVENOUS
Status: DISCONTINUED | OUTPATIENT
Start: 2021-06-23 | End: 2021-06-23 | Stop reason: HOSPADM

## 2021-06-23 RX ORDER — LABETALOL HYDROCHLORIDE 5 MG/ML
5 INJECTION, SOLUTION INTRAVENOUS
Status: DISCONTINUED | OUTPATIENT
Start: 2021-06-23 | End: 2021-06-23

## 2021-06-23 RX ORDER — PROMETHAZINE HYDROCHLORIDE 12.5 MG/1
12.5 TABLET ORAL EVERY 6 HOURS PRN
Status: DISCONTINUED | OUTPATIENT
Start: 2021-06-23 | End: 2021-06-30 | Stop reason: HOSPADM

## 2021-06-23 RX ORDER — ONDANSETRON 2 MG/ML
4 INJECTION INTRAMUSCULAR; INTRAVENOUS ONCE AS NEEDED
Status: DISCONTINUED | OUTPATIENT
Start: 2021-06-23 | End: 2021-06-23

## 2021-06-23 RX ORDER — PROMETHAZINE HYDROCHLORIDE 25 MG/1
25 TABLET ORAL ONCE AS NEEDED
Status: DISCONTINUED | OUTPATIENT
Start: 2021-06-23 | End: 2021-06-23

## 2021-06-23 RX ORDER — NALOXONE HCL 0.4 MG/ML
0.1 VIAL (ML) INJECTION
Status: DISCONTINUED | OUTPATIENT
Start: 2021-06-23 | End: 2021-06-24

## 2021-06-23 RX ORDER — OXYCODONE HYDROCHLORIDE 5 MG/1
5 TABLET ORAL EVERY 6 HOURS PRN
Status: DISCONTINUED | OUTPATIENT
Start: 2021-06-23 | End: 2021-06-24

## 2021-06-23 RX ORDER — FENTANYL CITRATE 50 UG/ML
INJECTION, SOLUTION INTRAMUSCULAR; INTRAVENOUS AS NEEDED
Status: DISCONTINUED | OUTPATIENT
Start: 2021-06-23 | End: 2021-06-23 | Stop reason: SURG

## 2021-06-23 RX ORDER — PROMETHAZINE HYDROCHLORIDE 25 MG/1
25 SUPPOSITORY RECTAL ONCE AS NEEDED
Status: DISCONTINUED | OUTPATIENT
Start: 2021-06-23 | End: 2021-06-23

## 2021-06-23 RX ORDER — IBUPROFEN 600 MG/1
600 TABLET ORAL ONCE AS NEEDED
Status: DISCONTINUED | OUTPATIENT
Start: 2021-06-23 | End: 2021-06-23

## 2021-06-23 RX ORDER — PROMETHAZINE HYDROCHLORIDE 25 MG/1
25 SUPPOSITORY RECTAL ONCE AS NEEDED
Status: DISCONTINUED | OUTPATIENT
Start: 2021-06-23 | End: 2021-06-23 | Stop reason: HOSPADM

## 2021-06-23 RX ORDER — FLUMAZENIL 0.1 MG/ML
0.2 INJECTION INTRAVENOUS AS NEEDED
Status: DISCONTINUED | OUTPATIENT
Start: 2021-06-23 | End: 2021-06-23

## 2021-06-23 RX ORDER — DIPHENHYDRAMINE HYDROCHLORIDE 50 MG/ML
12.5 INJECTION INTRAMUSCULAR; INTRAVENOUS
Status: DISCONTINUED | OUTPATIENT
Start: 2021-06-23 | End: 2021-06-23 | Stop reason: HOSPADM

## 2021-06-23 RX ORDER — SODIUM CHLORIDE, SODIUM LACTATE, POTASSIUM CHLORIDE, CALCIUM CHLORIDE 600; 310; 30; 20 MG/100ML; MG/100ML; MG/100ML; MG/100ML
125 INJECTION, SOLUTION INTRAVENOUS CONTINUOUS
Status: DISCONTINUED | OUTPATIENT
Start: 2021-06-23 | End: 2021-06-27

## 2021-06-23 RX ORDER — HYDRALAZINE HYDROCHLORIDE 20 MG/ML
5 INJECTION INTRAMUSCULAR; INTRAVENOUS
Status: DISCONTINUED | OUTPATIENT
Start: 2021-06-23 | End: 2021-06-23 | Stop reason: HOSPADM

## 2021-06-23 RX ORDER — DIPHENHYDRAMINE HCL 25 MG
25 CAPSULE ORAL
Status: DISCONTINUED | OUTPATIENT
Start: 2021-06-23 | End: 2021-06-23 | Stop reason: HOSPADM

## 2021-06-23 RX ORDER — FLUMAZENIL 0.1 MG/ML
0.2 INJECTION INTRAVENOUS AS NEEDED
Status: DISCONTINUED | OUTPATIENT
Start: 2021-06-23 | End: 2021-06-23 | Stop reason: HOSPADM

## 2021-06-23 RX ORDER — OXYCODONE AND ACETAMINOPHEN 10; 325 MG/1; MG/1
1 TABLET ORAL EVERY 4 HOURS PRN
Status: DISCONTINUED | OUTPATIENT
Start: 2021-06-23 | End: 2021-06-23

## 2021-06-23 RX ORDER — EPHEDRINE SULFATE 50 MG/ML
5 INJECTION, SOLUTION INTRAVENOUS ONCE AS NEEDED
Status: DISCONTINUED | OUTPATIENT
Start: 2021-06-23 | End: 2021-06-23

## 2021-06-23 RX ORDER — FAMOTIDINE 10 MG/ML
20 INJECTION, SOLUTION INTRAVENOUS ONCE
Status: COMPLETED | OUTPATIENT
Start: 2021-06-23 | End: 2021-06-23

## 2021-06-23 RX ORDER — SODIUM CHLORIDE, SODIUM LACTATE, POTASSIUM CHLORIDE, CALCIUM CHLORIDE 600; 310; 30; 20 MG/100ML; MG/100ML; MG/100ML; MG/100ML
9 INJECTION, SOLUTION INTRAVENOUS CONTINUOUS
Status: DISCONTINUED | OUTPATIENT
Start: 2021-06-23 | End: 2021-06-23

## 2021-06-23 RX ORDER — DIPHENHYDRAMINE HCL 25 MG
25 CAPSULE ORAL
Status: DISCONTINUED | OUTPATIENT
Start: 2021-06-23 | End: 2021-06-23

## 2021-06-23 RX ORDER — PROPOFOL 10 MG/ML
VIAL (ML) INTRAVENOUS AS NEEDED
Status: DISCONTINUED | OUTPATIENT
Start: 2021-06-23 | End: 2021-06-23 | Stop reason: SURG

## 2021-06-23 RX ORDER — GLYCOPYRROLATE 0.2 MG/ML
INJECTION INTRAMUSCULAR; INTRAVENOUS AS NEEDED
Status: DISCONTINUED | OUTPATIENT
Start: 2021-06-23 | End: 2021-06-23 | Stop reason: SURG

## 2021-06-23 RX ORDER — HYDROMORPHONE HYDROCHLORIDE 1 MG/ML
0.5 INJECTION, SOLUTION INTRAMUSCULAR; INTRAVENOUS; SUBCUTANEOUS
Status: DISCONTINUED | OUTPATIENT
Start: 2021-06-23 | End: 2021-06-23

## 2021-06-23 RX ORDER — TEMAZEPAM 7.5 MG/1
15 CAPSULE ORAL NIGHTLY PRN
Status: DISCONTINUED | OUTPATIENT
Start: 2021-06-23 | End: 2021-06-30 | Stop reason: HOSPADM

## 2021-06-23 RX ORDER — CEFAZOLIN SODIUM 500 MG/2.2ML
INJECTION, POWDER, FOR SOLUTION INTRAMUSCULAR; INTRAVENOUS AS NEEDED
Status: DISCONTINUED | OUTPATIENT
Start: 2021-06-23 | End: 2021-06-23 | Stop reason: SURG

## 2021-06-23 RX ORDER — ONDANSETRON 2 MG/ML
4 INJECTION INTRAMUSCULAR; INTRAVENOUS ONCE AS NEEDED
Status: DISCONTINUED | OUTPATIENT
Start: 2021-06-23 | End: 2021-06-23 | Stop reason: HOSPADM

## 2021-06-23 RX ORDER — AMOXICILLIN 250 MG
2 CAPSULE ORAL NIGHTLY
Status: DISCONTINUED | OUTPATIENT
Start: 2021-06-23 | End: 2021-06-24

## 2021-06-23 RX ADMIN — DOCUSATE SODIUM 50MG AND SENNOSIDES 8.6MG 2 TABLET: 8.6; 5 TABLET, FILM COATED ORAL at 21:50

## 2021-06-23 RX ADMIN — HYDROMORPHONE HYDROCHLORIDE 0.5 MG: 2 INJECTION, SOLUTION INTRAMUSCULAR; INTRAVENOUS; SUBCUTANEOUS at 08:04

## 2021-06-23 RX ADMIN — SODIUM CHLORIDE, POTASSIUM CHLORIDE, SODIUM LACTATE AND CALCIUM CHLORIDE: 600; 310; 30; 20 INJECTION, SOLUTION INTRAVENOUS at 09:43

## 2021-06-23 RX ADMIN — ONDANSETRON 4 MG: 2 INJECTION INTRAMUSCULAR; INTRAVENOUS at 11:25

## 2021-06-23 RX ADMIN — FENTANYL CITRATE 50 MCG: 50 INJECTION INTRAMUSCULAR; INTRAVENOUS at 07:55

## 2021-06-23 RX ADMIN — HYDROMORPHONE HYDROCHLORIDE 1 MG: 2 INJECTION, SOLUTION INTRAMUSCULAR; INTRAVENOUS; SUBCUTANEOUS at 11:50

## 2021-06-23 RX ADMIN — ROCURONIUM BROMIDE 50 MG: 50 INJECTION INTRAVENOUS at 07:32

## 2021-06-23 RX ADMIN — HYDROMORPHONE HYDROCHLORIDE 0.5 MG: 2 INJECTION, SOLUTION INTRAMUSCULAR; INTRAVENOUS; SUBCUTANEOUS at 07:57

## 2021-06-23 RX ADMIN — FENTANYL CITRATE 50 MCG: 50 INJECTION INTRAMUSCULAR; INTRAVENOUS at 10:04

## 2021-06-23 RX ADMIN — HEPARIN SODIUM 5000 UNITS: 5000 INJECTION INTRAVENOUS; SUBCUTANEOUS at 07:58

## 2021-06-23 RX ADMIN — ACETAMINOPHEN 1000 MG: 500 TABLET, FILM COATED ORAL at 21:50

## 2021-06-23 RX ADMIN — FENTANYL CITRATE 25 MCG: 50 INJECTION INTRAMUSCULAR; INTRAVENOUS at 07:32

## 2021-06-23 RX ADMIN — HYDROMORPHONE HYDROCHLORIDE: 10 INJECTION, SOLUTION INTRAMUSCULAR; INTRAVENOUS; SUBCUTANEOUS at 13:59

## 2021-06-23 RX ADMIN — ROCURONIUM BROMIDE 20 MG: 50 INJECTION INTRAVENOUS at 09:47

## 2021-06-23 RX ADMIN — FENTANYL CITRATE 50 MCG: 50 INJECTION, SOLUTION INTRAMUSCULAR; INTRAVENOUS at 13:15

## 2021-06-23 RX ADMIN — LIDOCAINE HYDROCHLORIDE 60 MG: 20 INJECTION, SOLUTION INFILTRATION; PERINEURAL at 07:32

## 2021-06-23 RX ADMIN — NEOSTIGMINE METHYLSULFATE 5 MG: 0.5 INJECTION INTRAVENOUS at 11:47

## 2021-06-23 RX ADMIN — FENTANYL CITRATE 50 MCG: 50 INJECTION INTRAMUSCULAR; INTRAVENOUS at 11:07

## 2021-06-23 RX ADMIN — FENTANYL CITRATE 50 MCG: 50 INJECTION, SOLUTION INTRAMUSCULAR; INTRAVENOUS at 12:43

## 2021-06-23 RX ADMIN — FAMOTIDINE 20 MG: 10 INJECTION INTRAVENOUS at 07:21

## 2021-06-23 RX ADMIN — SODIUM CHLORIDE, POTASSIUM CHLORIDE, SODIUM LACTATE AND CALCIUM CHLORIDE 9 ML/HR: 600; 310; 30; 20 INJECTION, SOLUTION INTRAVENOUS at 06:47

## 2021-06-23 RX ADMIN — ROCURONIUM BROMIDE 20 MG: 50 INJECTION INTRAVENOUS at 09:04

## 2021-06-23 RX ADMIN — FENTANYL CITRATE 25 MCG: 50 INJECTION INTRAMUSCULAR; INTRAVENOUS at 07:40

## 2021-06-23 RX ADMIN — GLYCOPYRROLATE 0.7 MG: 0.2 INJECTION INTRAMUSCULAR; INTRAVENOUS at 11:47

## 2021-06-23 RX ADMIN — GABAPENTIN 300 MG: 300 CAPSULE ORAL at 21:50

## 2021-06-23 RX ADMIN — CEFAZOLIN SODIUM 2 G: 2 INJECTION, SOLUTION INTRAVENOUS at 07:38

## 2021-06-23 RX ADMIN — HYDROMORPHONE HYDROCHLORIDE 0.5 MG: 1 INJECTION, SOLUTION INTRAMUSCULAR; INTRAVENOUS; SUBCUTANEOUS at 13:36

## 2021-06-23 RX ADMIN — LIDOCAINE HYDROCHLORIDE 0.5 ML: 10 INJECTION, SOLUTION EPIDURAL; INFILTRATION; INTRACAUDAL; PERINEURAL at 06:47

## 2021-06-23 RX ADMIN — HYDROCODONE BITARTRATE AND ACETAMINOPHEN 1 TABLET: 7.5; 325 TABLET ORAL at 13:24

## 2021-06-23 RX ADMIN — SODIUM CHLORIDE, POTASSIUM CHLORIDE, SODIUM LACTATE AND CALCIUM CHLORIDE: 600; 310; 30; 20 INJECTION, SOLUTION INTRAVENOUS at 11:23

## 2021-06-23 RX ADMIN — ROCURONIUM BROMIDE 10 MG: 50 INJECTION INTRAVENOUS at 11:11

## 2021-06-23 RX ADMIN — PROPOFOL 200 MG: 10 INJECTION, EMULSION INTRAVENOUS at 07:32

## 2021-06-23 RX ADMIN — FENTANYL CITRATE 100 MCG: 50 INJECTION INTRAMUSCULAR; INTRAVENOUS at 12:20

## 2021-06-23 RX ADMIN — CEFAZOLIN 2 G: 225 INJECTION, POWDER, FOR SOLUTION INTRAMUSCULAR; INTRAVENOUS at 11:43

## 2021-06-23 RX ADMIN — HYDROMORPHONE HYDROCHLORIDE 0.5 MG: 1 INJECTION, SOLUTION INTRAMUSCULAR; INTRAVENOUS; SUBCUTANEOUS at 12:43

## 2021-06-23 NOTE — ANESTHESIA PREPROCEDURE EVALUATION
Anesthesia Evaluation     Patient summary reviewed   no history of anesthetic complications:  NPO Solid Status: > 8 hours  NPO Liquid Status: > 2 hours           Airway   Mallampati: II  TM distance: >3 FB  Neck ROM: full  No difficulty expected  Dental - normal exam     Pulmonary     breath sounds clear to auscultation  (-) shortness of breath, recent URI, not a smoker  Cardiovascular     PT is on anticoagulation therapy  Rhythm: regular  Rate: normal    (+) DVT resolved,   (-) past MI, dysrhythmias, angina, SMITH      Neuro/Psych  (-) seizures, CVA    ROS Comment: Spina bifida with hx of  shunt, 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 Hx of hydrocephalus  GI/Hepatic/Renal/Endo    (-) no renal disease, diabetes    Musculoskeletal     (-) neck stiffness  Abdominal    Substance History      OB/GYN          Other                        Anesthesia Plan    ASA 3     general     intravenous induction     Anesthetic plan, all risks, benefits, and alternatives have been provided, discussed and informed consent has been obtained with: patient.  Use of blood products discussed with patient .   Plan discussed with CRNA.

## 2021-06-23 NOTE — ANESTHESIA PROCEDURE NOTES
Airway  Urgency: elective    Date/Time: 6/23/2021 7:36 AM  Airway not difficult    General Information and Staff    Patient location during procedure: OR  Anesthesiologist: Fabio Juarez DO  CRNA: Nicolette Hanson CRNA    Indications and Patient Condition  Indications for airway management: airway protection    Preoxygenated: yes  MILS maintained throughout  Mask difficulty assessment: 1 - vent by mask    Final Airway Details  Final airway type: endotracheal airway      Successful airway: ETT  Cuffed: yes   Successful intubation technique: direct laryngoscopy  Facilitating devices/methods: intubating stylet  Endotracheal tube insertion site: oral  Blade: Gonzalez  Blade size: 2  ETT size (mm): 7.0  Cormack-Lehane Classification: grade I - full view of glottis  Placement verified by: chest auscultation and capnometry   Cuff volume (mL): 8  Measured from: lips  ETT/EBT  to lips (cm): 21  Number of attempts at approach: 1  Assessment: lips, teeth, and gum same as pre-op and atraumatic intubation    Additional Comments  Pt preoxygenated, SIVI, bag mask vent, ATETI, dentition as before

## 2021-06-23 NOTE — ANESTHESIA POSTPROCEDURE EVALUATION
Patient: Angélica Lopez    Procedure Summary     Date: 06/23/21 Room / Location:  [facility]  [facility]    Anesthesia Start: 0728 Anesthesia Stop: 1238    Procedure: TOTAL ABDOMINAL HYSTERECTOMY, BILATERAL SALPINGECTOMY, RIGHT OOPHRECTOMY, LYSIS OF ADHESIONS  (N/A Abdomen) Diagnosis:       Intramural and subserous leiomyoma of uterus      Pelvic pressure in female      (Intramural and subserous leiomyoma of uterus [D25.1, D25.2])      (Pelvic pressure in female [R10.2])    Surgeons: Emani Chapa MD Provider: Fabio Juarez DO    Anesthesia Type: general ASA Status: 3          Anesthesia Type: general    Vitals  Vitals Value Taken Time   /73 06/23/21 1415   Temp 37 °C (98.6 °F) 06/23/21 1234   Pulse 74 06/23/21 1422   Resp 20 06/23/21 1400   SpO2 98 % 06/23/21 1422   Vitals shown include unvalidated device data.        Post Anesthesia Care and Evaluation    Patient location during evaluation: bedside  Patient participation: complete - patient participated  Level of consciousness: awake and alert  Pain score: 0  Pain management: adequate  Airway patency: patent  Anesthetic complications: No anesthetic complications  PONV Status: none  Cardiovascular status: acceptable and hemodynamically stable  Respiratory status: acceptable and spontaneous ventilation  Hydration status: acceptable    Comments: /73   Pulse 64   Temp 37 °C (98.6 °F) (Temporal)   Resp 20   LMP 05/05/2021 (Within Days)   SpO2 97%

## 2021-06-24 LAB
ANION GAP SERPL CALCULATED.3IONS-SCNC: 11.8 MMOL/L (ref 5–15)
BUN SERPL-MCNC: 7 MG/DL (ref 6–20)
BUN/CREAT SERPL: 11.3 (ref 7–25)
CALCIUM SPEC-SCNC: 7.7 MG/DL (ref 8.6–10.5)
CHLORIDE SERPL-SCNC: 101 MMOL/L (ref 98–107)
CO2 SERPL-SCNC: 22.2 MMOL/L (ref 22–29)
CREAT SERPL-MCNC: 0.62 MG/DL (ref 0.57–1)
DEPRECATED RDW RBC AUTO: 46.8 FL (ref 37–54)
ERYTHROCYTE [DISTWIDTH] IN BLOOD BY AUTOMATED COUNT: 13.3 % (ref 12.3–15.4)
GFR SERPL CREATININE-BSD FRML MDRD: 102 ML/MIN/1.73
GLUCOSE SERPL-MCNC: 111 MG/DL (ref 65–99)
HCT VFR BLD AUTO: 37.6 % (ref 34–46.6)
HGB BLD-MCNC: 12.5 G/DL (ref 12–15.9)
LAB AP CASE REPORT: NORMAL
MCH RBC QN AUTO: 31.6 PG (ref 26.6–33)
MCHC RBC AUTO-ENTMCNC: 33.2 G/DL (ref 31.5–35.7)
MCV RBC AUTO: 95.2 FL (ref 79–97)
PATH REPORT.FINAL DX SPEC: NORMAL
PATH REPORT.GROSS SPEC: NORMAL
PLATELET # BLD AUTO: 259 10*3/MM3 (ref 140–450)
PMV BLD AUTO: 11.2 FL (ref 6–12)
POTASSIUM SERPL-SCNC: 4 MMOL/L (ref 3.5–5.2)
RBC # BLD AUTO: 3.95 10*6/MM3 (ref 3.77–5.28)
SODIUM SERPL-SCNC: 135 MMOL/L (ref 136–145)
WBC # BLD AUTO: 11.04 10*3/MM3 (ref 3.4–10.8)

## 2021-06-24 PROCEDURE — 25010000002 ENOXAPARIN PER 10 MG: Performed by: STUDENT IN AN ORGANIZED HEALTH CARE EDUCATION/TRAINING PROGRAM

## 2021-06-24 PROCEDURE — 80048 BASIC METABOLIC PNL TOTAL CA: CPT | Performed by: STUDENT IN AN ORGANIZED HEALTH CARE EDUCATION/TRAINING PROGRAM

## 2021-06-24 PROCEDURE — 25010000002 ONDANSETRON PER 1 MG: Performed by: STUDENT IN AN ORGANIZED HEALTH CARE EDUCATION/TRAINING PROGRAM

## 2021-06-24 PROCEDURE — 25010000002 HYDROMORPHONE PER 4 MG: Performed by: STUDENT IN AN ORGANIZED HEALTH CARE EDUCATION/TRAINING PROGRAM

## 2021-06-24 PROCEDURE — 85027 COMPLETE CBC AUTOMATED: CPT | Performed by: STUDENT IN AN ORGANIZED HEALTH CARE EDUCATION/TRAINING PROGRAM

## 2021-06-24 RX ORDER — AMOXICILLIN 250 MG
1 CAPSULE ORAL NIGHTLY
Status: DISCONTINUED | OUTPATIENT
Start: 2021-06-24 | End: 2021-06-25

## 2021-06-24 RX ORDER — SIMETHICONE 80 MG
80 TABLET,CHEWABLE ORAL 4 TIMES DAILY PRN
Status: DISCONTINUED | OUTPATIENT
Start: 2021-06-24 | End: 2021-06-30 | Stop reason: HOSPADM

## 2021-06-24 RX ORDER — OXYCODONE HYDROCHLORIDE 5 MG/1
5 TABLET ORAL EVERY 4 HOURS PRN
Status: DISCONTINUED | OUTPATIENT
Start: 2021-06-24 | End: 2021-06-30 | Stop reason: HOSPADM

## 2021-06-24 RX ORDER — HYDROMORPHONE HYDROCHLORIDE 1 MG/ML
0.5 INJECTION, SOLUTION INTRAMUSCULAR; INTRAVENOUS; SUBCUTANEOUS
Status: DISCONTINUED | OUTPATIENT
Start: 2021-06-24 | End: 2021-06-30 | Stop reason: HOSPADM

## 2021-06-24 RX ORDER — OXYCODONE HYDROCHLORIDE 5 MG/1
10 TABLET ORAL EVERY 4 HOURS PRN
Status: DISCONTINUED | OUTPATIENT
Start: 2021-06-24 | End: 2021-06-30 | Stop reason: HOSPADM

## 2021-06-24 RX ADMIN — ACETAMINOPHEN 1000 MG: 500 TABLET, FILM COATED ORAL at 04:29

## 2021-06-24 RX ADMIN — OXYCODONE 10 MG: 5 TABLET ORAL at 13:49

## 2021-06-24 RX ADMIN — ONDANSETRON 4 MG: 2 INJECTION INTRAMUSCULAR; INTRAVENOUS at 23:42

## 2021-06-24 RX ADMIN — GABAPENTIN 300 MG: 300 CAPSULE ORAL at 21:27

## 2021-06-24 RX ADMIN — SIMETHICONE 80 MG: 80 TABLET, CHEWABLE ORAL at 23:19

## 2021-06-24 RX ADMIN — ACETAMINOPHEN 1000 MG: 500 TABLET, FILM COATED ORAL at 13:46

## 2021-06-24 RX ADMIN — ACETAMINOPHEN 1000 MG: 500 TABLET, FILM COATED ORAL at 20:51

## 2021-06-24 RX ADMIN — HYDROMORPHONE HYDROCHLORIDE 0.5 MG: 1 INJECTION, SOLUTION INTRAMUSCULAR; INTRAVENOUS; SUBCUTANEOUS at 11:45

## 2021-06-24 RX ADMIN — SIMETHICONE 80 MG: 80 TABLET, CHEWABLE ORAL at 18:50

## 2021-06-24 RX ADMIN — OXYCODONE HYDROCHLORIDE 5 MG: 5 TABLET ORAL at 04:42

## 2021-06-24 RX ADMIN — SODIUM CHLORIDE, POTASSIUM CHLORIDE, SODIUM LACTATE AND CALCIUM CHLORIDE 100 ML/HR: 600; 310; 30; 20 INJECTION, SOLUTION INTRAVENOUS at 01:54

## 2021-06-24 RX ADMIN — GABAPENTIN 300 MG: 300 CAPSULE ORAL at 13:46

## 2021-06-24 RX ADMIN — OXYCODONE 10 MG: 5 TABLET ORAL at 09:24

## 2021-06-24 RX ADMIN — TEMAZEPAM 15 MG: 7.5 CAPSULE ORAL at 23:03

## 2021-06-24 RX ADMIN — OXYCODONE 10 MG: 5 TABLET ORAL at 18:30

## 2021-06-24 RX ADMIN — DOCUSATE SODIUM 100 MG: 100 CAPSULE, LIQUID FILLED ORAL at 18:36

## 2021-06-24 RX ADMIN — GABAPENTIN 300 MG: 300 CAPSULE ORAL at 05:53

## 2021-06-24 RX ADMIN — OXYCODONE 10 MG: 5 TABLET ORAL at 23:03

## 2021-06-24 RX ADMIN — ENOXAPARIN SODIUM 40 MG: 40 INJECTION SUBCUTANEOUS at 08:10

## 2021-06-24 RX ADMIN — DOCUSATE SODIUM 50MG AND SENNOSIDES 8.6MG 1 TABLET: 8.6; 5 TABLET, FILM COATED ORAL at 20:51

## 2021-06-25 ENCOUNTER — APPOINTMENT (OUTPATIENT)
Dept: GENERAL RADIOLOGY | Facility: HOSPITAL | Age: 51
End: 2021-06-25

## 2021-06-25 LAB
ANION GAP SERPL CALCULATED.3IONS-SCNC: 12.9 MMOL/L (ref 5–15)
BASOPHILS # BLD AUTO: 0.01 10*3/MM3 (ref 0–0.2)
BASOPHILS NFR BLD AUTO: 0.1 % (ref 0–1.5)
BUN SERPL-MCNC: 8 MG/DL (ref 6–20)
BUN/CREAT SERPL: 12.7 (ref 7–25)
CALCIUM SPEC-SCNC: 8.8 MG/DL (ref 8.6–10.5)
CHLORIDE SERPL-SCNC: 99 MMOL/L (ref 98–107)
CO2 SERPL-SCNC: 26.1 MMOL/L (ref 22–29)
CREAT SERPL-MCNC: 0.63 MG/DL (ref 0.57–1)
DEPRECATED RDW RBC AUTO: 49.5 FL (ref 37–54)
EOSINOPHIL # BLD AUTO: 0.01 10*3/MM3 (ref 0–0.4)
EOSINOPHIL NFR BLD AUTO: 0.1 % (ref 0.3–6.2)
ERYTHROCYTE [DISTWIDTH] IN BLOOD BY AUTOMATED COUNT: 13.7 % (ref 12.3–15.4)
GFR SERPL CREATININE-BSD FRML MDRD: 100 ML/MIN/1.73
GLUCOSE SERPL-MCNC: 131 MG/DL (ref 65–99)
HCT VFR BLD AUTO: 42 % (ref 34–46.6)
HGB BLD-MCNC: 13.6 G/DL (ref 12–15.9)
IMM GRANULOCYTES # BLD AUTO: 0.06 10*3/MM3 (ref 0–0.05)
IMM GRANULOCYTES NFR BLD AUTO: 0.5 % (ref 0–0.5)
LYMPHOCYTES # BLD AUTO: 0.39 10*3/MM3 (ref 0.7–3.1)
LYMPHOCYTES NFR BLD AUTO: 3.1 % (ref 19.6–45.3)
MAGNESIUM SERPL-MCNC: 1.8 MG/DL (ref 1.6–2.6)
MCH RBC QN AUTO: 31.2 PG (ref 26.6–33)
MCHC RBC AUTO-ENTMCNC: 32.4 G/DL (ref 31.5–35.7)
MCV RBC AUTO: 96.3 FL (ref 79–97)
MONOCYTES # BLD AUTO: 0.81 10*3/MM3 (ref 0.1–0.9)
MONOCYTES NFR BLD AUTO: 6.4 % (ref 5–12)
NEUTROPHILS NFR BLD AUTO: 11.35 10*3/MM3 (ref 1.7–7)
NEUTROPHILS NFR BLD AUTO: 89.8 % (ref 42.7–76)
NRBC BLD AUTO-RTO: 0 /100 WBC (ref 0–0.2)
PHOSPHATE SERPL-MCNC: 3.4 MG/DL (ref 2.5–4.5)
PLATELET # BLD AUTO: 259 10*3/MM3 (ref 140–450)
PMV BLD AUTO: 10.7 FL (ref 6–12)
POTASSIUM SERPL-SCNC: 3.9 MMOL/L (ref 3.5–5.2)
RBC # BLD AUTO: 4.36 10*6/MM3 (ref 3.77–5.28)
SODIUM SERPL-SCNC: 138 MMOL/L (ref 136–145)
WBC # BLD AUTO: 12.63 10*3/MM3 (ref 3.4–10.8)

## 2021-06-25 PROCEDURE — 80048 BASIC METABOLIC PNL TOTAL CA: CPT | Performed by: STUDENT IN AN ORGANIZED HEALTH CARE EDUCATION/TRAINING PROGRAM

## 2021-06-25 PROCEDURE — 85025 COMPLETE CBC W/AUTO DIFF WBC: CPT | Performed by: STUDENT IN AN ORGANIZED HEALTH CARE EDUCATION/TRAINING PROGRAM

## 2021-06-25 PROCEDURE — 25010000002 ENOXAPARIN PER 10 MG: Performed by: STUDENT IN AN ORGANIZED HEALTH CARE EDUCATION/TRAINING PROGRAM

## 2021-06-25 PROCEDURE — 83735 ASSAY OF MAGNESIUM: CPT | Performed by: STUDENT IN AN ORGANIZED HEALTH CARE EDUCATION/TRAINING PROGRAM

## 2021-06-25 PROCEDURE — 74022 RADEX COMPL AQT ABD SERIES: CPT

## 2021-06-25 PROCEDURE — 25010000002 HYDROMORPHONE PER 4 MG: Performed by: STUDENT IN AN ORGANIZED HEALTH CARE EDUCATION/TRAINING PROGRAM

## 2021-06-25 PROCEDURE — 84100 ASSAY OF PHOSPHORUS: CPT | Performed by: STUDENT IN AN ORGANIZED HEALTH CARE EDUCATION/TRAINING PROGRAM

## 2021-06-25 RX ORDER — SODIUM CHLORIDE, SODIUM LACTATE, POTASSIUM CHLORIDE, CALCIUM CHLORIDE 600; 310; 30; 20 MG/100ML; MG/100ML; MG/100ML; MG/100ML
75 INJECTION, SOLUTION INTRAVENOUS CONTINUOUS
Status: DISCONTINUED | OUTPATIENT
Start: 2021-06-25 | End: 2021-06-26

## 2021-06-25 RX ORDER — AMOXICILLIN 250 MG
1 CAPSULE ORAL 2 TIMES DAILY
Status: DISCONTINUED | OUTPATIENT
Start: 2021-06-25 | End: 2021-06-30 | Stop reason: HOSPADM

## 2021-06-25 RX ORDER — IBUPROFEN 600 MG/1
600 TABLET ORAL EVERY 6 HOURS SCHEDULED
Status: DISCONTINUED | OUTPATIENT
Start: 2021-06-25 | End: 2021-06-28

## 2021-06-25 RX ADMIN — ACETAMINOPHEN 1000 MG: 500 TABLET, FILM COATED ORAL at 13:47

## 2021-06-25 RX ADMIN — TEMAZEPAM 15 MG: 7.5 CAPSULE ORAL at 23:39

## 2021-06-25 RX ADMIN — ACETAMINOPHEN 1000 MG: 500 TABLET, FILM COATED ORAL at 05:02

## 2021-06-25 RX ADMIN — HYDROMORPHONE HYDROCHLORIDE 0.5 MG: 1 INJECTION, SOLUTION INTRAMUSCULAR; INTRAVENOUS; SUBCUTANEOUS at 01:54

## 2021-06-25 RX ADMIN — OXYCODONE HYDROCHLORIDE 5 MG: 5 TABLET ORAL at 13:46

## 2021-06-25 RX ADMIN — SIMETHICONE 80 MG: 80 TABLET, CHEWABLE ORAL at 08:36

## 2021-06-25 RX ADMIN — SIMETHICONE 80 MG: 80 TABLET, CHEWABLE ORAL at 13:51

## 2021-06-25 RX ADMIN — DOCUSATE SODIUM 100 MG: 100 CAPSULE, LIQUID FILLED ORAL at 08:36

## 2021-06-25 RX ADMIN — ACETAMINOPHEN 1000 MG: 500 TABLET, FILM COATED ORAL at 20:59

## 2021-06-25 RX ADMIN — OXYCODONE 10 MG: 5 TABLET ORAL at 08:36

## 2021-06-25 RX ADMIN — GABAPENTIN 300 MG: 300 CAPSULE ORAL at 05:02

## 2021-06-25 RX ADMIN — IBUPROFEN 600 MG: 600 TABLET ORAL at 11:25

## 2021-06-25 RX ADMIN — GABAPENTIN 300 MG: 300 CAPSULE ORAL at 13:47

## 2021-06-25 RX ADMIN — GABAPENTIN 300 MG: 300 CAPSULE ORAL at 21:00

## 2021-06-25 RX ADMIN — SODIUM CHLORIDE, POTASSIUM CHLORIDE, SODIUM LACTATE AND CALCIUM CHLORIDE 75 ML/HR: 600; 310; 30; 20 INJECTION, SOLUTION INTRAVENOUS at 11:26

## 2021-06-25 RX ADMIN — DOCUSATE SODIUM 50MG AND SENNOSIDES 8.6MG 1 TABLET: 8.6; 5 TABLET, FILM COATED ORAL at 20:59

## 2021-06-25 RX ADMIN — IBUPROFEN 600 MG: 600 TABLET ORAL at 18:30

## 2021-06-25 RX ADMIN — ENOXAPARIN SODIUM 40 MG: 40 INJECTION SUBCUTANEOUS at 08:36

## 2021-06-25 RX ADMIN — IBUPROFEN 600 MG: 600 TABLET ORAL at 23:39

## 2021-06-26 LAB
ANION GAP SERPL CALCULATED.3IONS-SCNC: 10.2 MMOL/L (ref 5–15)
BUN SERPL-MCNC: 10 MG/DL (ref 6–20)
BUN/CREAT SERPL: 18.2 (ref 7–25)
CALCIUM SPEC-SCNC: 7.8 MG/DL (ref 8.6–10.5)
CHLORIDE SERPL-SCNC: 102 MMOL/L (ref 98–107)
CO2 SERPL-SCNC: 29.8 MMOL/L (ref 22–29)
CREAT SERPL-MCNC: 0.55 MG/DL (ref 0.57–1)
DEPRECATED RDW RBC AUTO: 47 FL (ref 37–54)
EOSINOPHIL # BLD MANUAL: 0.38 10*3/MM3 (ref 0–0.4)
EOSINOPHIL NFR BLD MANUAL: 7.1 % (ref 0.3–6.2)
ERYTHROCYTE [DISTWIDTH] IN BLOOD BY AUTOMATED COUNT: 13.3 % (ref 12.3–15.4)
GFR SERPL CREATININE-BSD FRML MDRD: 117 ML/MIN/1.73
GLUCOSE SERPL-MCNC: 93 MG/DL (ref 65–99)
HCT VFR BLD AUTO: 36.3 % (ref 34–46.6)
HGB BLD-MCNC: 11.8 G/DL (ref 12–15.9)
LYMPHOCYTES # BLD MANUAL: 0.55 10*3/MM3 (ref 0.7–3.1)
LYMPHOCYTES NFR BLD MANUAL: 10.2 % (ref 19.6–45.3)
LYMPHOCYTES NFR BLD MANUAL: 15.3 % (ref 5–12)
MAGNESIUM SERPL-MCNC: 1.7 MG/DL (ref 1.6–2.6)
MCH RBC QN AUTO: 31.1 PG (ref 26.6–33)
MCHC RBC AUTO-ENTMCNC: 32.5 G/DL (ref 31.5–35.7)
MCV RBC AUTO: 95.5 FL (ref 79–97)
MONOCYTES # BLD AUTO: 0.82 10*3/MM3 (ref 0.1–0.9)
NEUTROPHILS # BLD AUTO: 3.61 10*3/MM3 (ref 1.7–7)
NEUTROPHILS NFR BLD MANUAL: 67.3 % (ref 42.7–76)
PHOSPHATE SERPL-MCNC: 3.2 MG/DL (ref 2.5–4.5)
PLAT MORPH BLD: NORMAL
PLATELET # BLD AUTO: 255 10*3/MM3 (ref 140–450)
PMV BLD AUTO: 10.3 FL (ref 6–12)
POTASSIUM SERPL-SCNC: 3 MMOL/L (ref 3.5–5.2)
RBC # BLD AUTO: 3.8 10*6/MM3 (ref 3.77–5.28)
RBC MORPH BLD: NORMAL
SODIUM SERPL-SCNC: 142 MMOL/L (ref 136–145)
WBC # BLD AUTO: 5.36 10*3/MM3 (ref 3.4–10.8)
WBC MORPH BLD: NORMAL

## 2021-06-26 PROCEDURE — 80048 BASIC METABOLIC PNL TOTAL CA: CPT | Performed by: STUDENT IN AN ORGANIZED HEALTH CARE EDUCATION/TRAINING PROGRAM

## 2021-06-26 PROCEDURE — 25010000002 ENOXAPARIN PER 10 MG: Performed by: STUDENT IN AN ORGANIZED HEALTH CARE EDUCATION/TRAINING PROGRAM

## 2021-06-26 PROCEDURE — 83735 ASSAY OF MAGNESIUM: CPT | Performed by: STUDENT IN AN ORGANIZED HEALTH CARE EDUCATION/TRAINING PROGRAM

## 2021-06-26 PROCEDURE — 84100 ASSAY OF PHOSPHORUS: CPT | Performed by: STUDENT IN AN ORGANIZED HEALTH CARE EDUCATION/TRAINING PROGRAM

## 2021-06-26 PROCEDURE — 25010000002 ONDANSETRON PER 1 MG: Performed by: STUDENT IN AN ORGANIZED HEALTH CARE EDUCATION/TRAINING PROGRAM

## 2021-06-26 PROCEDURE — 85025 COMPLETE CBC W/AUTO DIFF WBC: CPT | Performed by: STUDENT IN AN ORGANIZED HEALTH CARE EDUCATION/TRAINING PROGRAM

## 2021-06-26 PROCEDURE — 85007 BL SMEAR W/DIFF WBC COUNT: CPT | Performed by: STUDENT IN AN ORGANIZED HEALTH CARE EDUCATION/TRAINING PROGRAM

## 2021-06-26 RX ORDER — ALUMINA, MAGNESIA, AND SIMETHICONE 2400; 2400; 240 MG/30ML; MG/30ML; MG/30ML
15 SUSPENSION ORAL EVERY 6 HOURS PRN
Status: DISCONTINUED | OUTPATIENT
Start: 2021-06-26 | End: 2021-06-30 | Stop reason: HOSPADM

## 2021-06-26 RX ORDER — POTASSIUM CHLORIDE 1.5 G/1.77G
40 POWDER, FOR SOLUTION ORAL AS NEEDED
Status: DISCONTINUED | OUTPATIENT
Start: 2021-06-26 | End: 2021-06-27

## 2021-06-26 RX ORDER — POTASSIUM CHLORIDE 750 MG/1
40 TABLET, FILM COATED, EXTENDED RELEASE ORAL AS NEEDED
Status: DISCONTINUED | OUTPATIENT
Start: 2021-06-26 | End: 2021-06-27

## 2021-06-26 RX ADMIN — ACETAMINOPHEN 1000 MG: 500 TABLET, FILM COATED ORAL at 05:10

## 2021-06-26 RX ADMIN — POTASSIUM CHLORIDE 40 MEQ: 750 TABLET, EXTENDED RELEASE ORAL at 14:17

## 2021-06-26 RX ADMIN — MAGNESIUM HYDROXIDE,ALUMINUM HYDROXICE,SIMETHICONE 15 ML: 240; 2400; 2400 SUSPENSION ORAL at 23:29

## 2021-06-26 RX ADMIN — ONDANSETRON 4 MG: 2 INJECTION INTRAMUSCULAR; INTRAVENOUS at 21:30

## 2021-06-26 RX ADMIN — GABAPENTIN 300 MG: 300 CAPSULE ORAL at 22:02

## 2021-06-26 RX ADMIN — SODIUM CHLORIDE, POTASSIUM CHLORIDE, SODIUM LACTATE AND CALCIUM CHLORIDE 125 ML/HR: 600; 310; 30; 20 INJECTION, SOLUTION INTRAVENOUS at 23:26

## 2021-06-26 RX ADMIN — POTASSIUM CHLORIDE 40 MEQ: 750 TABLET, EXTENDED RELEASE ORAL at 17:35

## 2021-06-26 RX ADMIN — IBUPROFEN 600 MG: 600 TABLET ORAL at 12:35

## 2021-06-26 RX ADMIN — GABAPENTIN 300 MG: 300 CAPSULE ORAL at 05:10

## 2021-06-26 RX ADMIN — ACETAMINOPHEN 1000 MG: 500 TABLET, FILM COATED ORAL at 20:31

## 2021-06-26 RX ADMIN — TEMAZEPAM 15 MG: 7.5 CAPSULE ORAL at 22:02

## 2021-06-26 RX ADMIN — SIMETHICONE 80 MG: 80 TABLET, CHEWABLE ORAL at 02:07

## 2021-06-26 RX ADMIN — ACETAMINOPHEN 1000 MG: 500 TABLET, FILM COATED ORAL at 14:07

## 2021-06-26 RX ADMIN — DOCUSATE SODIUM 50MG AND SENNOSIDES 8.6MG 1 TABLET: 8.6; 5 TABLET, FILM COATED ORAL at 08:32

## 2021-06-26 RX ADMIN — IBUPROFEN 600 MG: 600 TABLET ORAL at 17:35

## 2021-06-26 RX ADMIN — DOCUSATE SODIUM 50MG AND SENNOSIDES 8.6MG 1 TABLET: 8.6; 5 TABLET, FILM COATED ORAL at 20:30

## 2021-06-26 RX ADMIN — GABAPENTIN 300 MG: 300 CAPSULE ORAL at 14:07

## 2021-06-26 RX ADMIN — SODIUM CHLORIDE, POTASSIUM CHLORIDE, SODIUM LACTATE AND CALCIUM CHLORIDE 75 ML/HR: 600; 310; 30; 20 INJECTION, SOLUTION INTRAVENOUS at 02:18

## 2021-06-26 RX ADMIN — ENOXAPARIN SODIUM 40 MG: 40 INJECTION SUBCUTANEOUS at 08:32

## 2021-06-26 RX ADMIN — SIMETHICONE 80 MG: 80 TABLET, CHEWABLE ORAL at 20:31

## 2021-06-27 ENCOUNTER — APPOINTMENT (OUTPATIENT)
Dept: GENERAL RADIOLOGY | Facility: HOSPITAL | Age: 51
End: 2021-06-27

## 2021-06-27 PROBLEM — I10 HTN (HYPERTENSION): Status: ACTIVE | Noted: 2021-06-27

## 2021-06-27 PROBLEM — E87.6 HYPOKALEMIA: Status: ACTIVE | Noted: 2021-06-27

## 2021-06-27 LAB
ALBUMIN SERPL-MCNC: 3 G/DL (ref 3.5–5.2)
ALBUMIN SERPL-MCNC: 3.7 G/DL (ref 3.5–5.2)
ALBUMIN/GLOB SERPL: 1.1 G/DL
ALBUMIN/GLOB SERPL: 1.7 G/DL
ALP SERPL-CCNC: 109 U/L (ref 39–117)
ALP SERPL-CCNC: 110 U/L (ref 39–117)
ALT SERPL W P-5'-P-CCNC: 21 U/L (ref 1–33)
ALT SERPL W P-5'-P-CCNC: 21 U/L (ref 1–33)
ANION GAP SERPL CALCULATED.3IONS-SCNC: 10.1 MMOL/L (ref 5–15)
ANION GAP SERPL CALCULATED.3IONS-SCNC: 12.5 MMOL/L (ref 5–15)
AST SERPL-CCNC: 19 U/L (ref 1–32)
AST SERPL-CCNC: 20 U/L (ref 1–32)
BILIRUB SERPL-MCNC: 0.6 MG/DL (ref 0–1.2)
BILIRUB SERPL-MCNC: 0.6 MG/DL (ref 0–1.2)
BUN SERPL-MCNC: 10 MG/DL (ref 6–20)
BUN SERPL-MCNC: 9 MG/DL (ref 6–20)
BUN/CREAT SERPL: 20.8 (ref 7–25)
BUN/CREAT SERPL: 23.1 (ref 7–25)
CALCIUM SPEC-SCNC: 8.1 MG/DL (ref 8.6–10.5)
CALCIUM SPEC-SCNC: 8.2 MG/DL (ref 8.6–10.5)
CHLORIDE SERPL-SCNC: 100 MMOL/L (ref 98–107)
CHLORIDE SERPL-SCNC: 99 MMOL/L (ref 98–107)
CO2 SERPL-SCNC: 33.5 MMOL/L (ref 22–29)
CO2 SERPL-SCNC: 33.9 MMOL/L (ref 22–29)
CREAT SERPL-MCNC: 0.39 MG/DL (ref 0.57–1)
CREAT SERPL-MCNC: 0.48 MG/DL (ref 0.57–1)
DEPRECATED RDW RBC AUTO: 44.1 FL (ref 37–54)
EOSINOPHIL # BLD MANUAL: 0.05 10*3/MM3 (ref 0–0.4)
EOSINOPHIL NFR BLD MANUAL: 1 % (ref 0.3–6.2)
ERYTHROCYTE [DISTWIDTH] IN BLOOD BY AUTOMATED COUNT: 12.8 % (ref 12.3–15.4)
GFR SERPL CREATININE-BSD FRML MDRD: 137 ML/MIN/1.73
GFR SERPL CREATININE-BSD FRML MDRD: >150 ML/MIN/1.73
GLOBULIN UR ELPH-MCNC: 2.2 GM/DL
GLOBULIN UR ELPH-MCNC: 2.8 GM/DL
GLUCOSE SERPL-MCNC: 103 MG/DL (ref 65–99)
GLUCOSE SERPL-MCNC: 94 MG/DL (ref 65–99)
HCT VFR BLD AUTO: 37.8 % (ref 34–46.6)
HGB BLD-MCNC: 12.6 G/DL (ref 12–15.9)
LYMPHOCYTES # BLD MANUAL: 0.3 10*3/MM3 (ref 0.7–3.1)
LYMPHOCYTES NFR BLD MANUAL: 12 % (ref 5–12)
LYMPHOCYTES NFR BLD MANUAL: 6 % (ref 19.6–45.3)
MAGNESIUM SERPL-MCNC: 2 MG/DL (ref 1.6–2.6)
MCH RBC QN AUTO: 31.4 PG (ref 26.6–33)
MCHC RBC AUTO-ENTMCNC: 33.3 G/DL (ref 31.5–35.7)
MCV RBC AUTO: 94.3 FL (ref 79–97)
MONOCYTES # BLD AUTO: 0.59 10*3/MM3 (ref 0.1–0.9)
NEUTROPHILS # BLD AUTO: 4.01 10*3/MM3 (ref 1.7–7)
NEUTROPHILS NFR BLD MANUAL: 81 % (ref 42.7–76)
PLAT MORPH BLD: NORMAL
PLATELET # BLD AUTO: 329 10*3/MM3 (ref 140–450)
PMV BLD AUTO: 10.5 FL (ref 6–12)
POTASSIUM SERPL-SCNC: 2.7 MMOL/L (ref 3.5–5.2)
POTASSIUM SERPL-SCNC: 2.7 MMOL/L (ref 3.5–5.2)
PROT SERPL-MCNC: 5.8 G/DL (ref 6–8.5)
PROT SERPL-MCNC: 5.9 G/DL (ref 6–8.5)
RBC # BLD AUTO: 4.01 10*6/MM3 (ref 3.77–5.28)
RBC MORPH BLD: NORMAL
SODIUM SERPL-SCNC: 144 MMOL/L (ref 136–145)
SODIUM SERPL-SCNC: 145 MMOL/L (ref 136–145)
WBC # BLD AUTO: 4.95 10*3/MM3 (ref 3.4–10.8)
WBC MORPH BLD: NORMAL

## 2021-06-27 PROCEDURE — 97162 PT EVAL MOD COMPLEX 30 MIN: CPT

## 2021-06-27 PROCEDURE — 80053 COMPREHEN METABOLIC PANEL: CPT | Performed by: NURSE PRACTITIONER

## 2021-06-27 PROCEDURE — 85007 BL SMEAR W/DIFF WBC COUNT: CPT | Performed by: OBSTETRICS & GYNECOLOGY

## 2021-06-27 PROCEDURE — 25010000002 HYDROMORPHONE PER 4 MG: Performed by: STUDENT IN AN ORGANIZED HEALTH CARE EDUCATION/TRAINING PROGRAM

## 2021-06-27 PROCEDURE — 80053 COMPREHEN METABOLIC PANEL: CPT | Performed by: OBSTETRICS & GYNECOLOGY

## 2021-06-27 PROCEDURE — 74018 RADEX ABDOMEN 1 VIEW: CPT

## 2021-06-27 PROCEDURE — 83735 ASSAY OF MAGNESIUM: CPT | Performed by: NURSE PRACTITIONER

## 2021-06-27 PROCEDURE — 25010000002 ENOXAPARIN PER 10 MG: Performed by: STUDENT IN AN ORGANIZED HEALTH CARE EDUCATION/TRAINING PROGRAM

## 2021-06-27 PROCEDURE — 97530 THERAPEUTIC ACTIVITIES: CPT

## 2021-06-27 PROCEDURE — 85025 COMPLETE CBC W/AUTO DIFF WBC: CPT | Performed by: OBSTETRICS & GYNECOLOGY

## 2021-06-27 PROCEDURE — 25010000003 POTASSIUM CHLORIDE 10 MEQ/100ML SOLUTION: Performed by: NURSE PRACTITIONER

## 2021-06-27 PROCEDURE — 25010000002 ONDANSETRON PER 1 MG: Performed by: STUDENT IN AN ORGANIZED HEALTH CARE EDUCATION/TRAINING PROGRAM

## 2021-06-27 RX ORDER — DEXTROSE MONOHYDRATE, SODIUM CHLORIDE, SODIUM LACTATE, POTASSIUM CHLORIDE, CALCIUM CHLORIDE 5; 600; 310; 179; 20 G/100ML; MG/100ML; MG/100ML; MG/100ML; MG/100ML
50 INJECTION, SOLUTION INTRAVENOUS CONTINUOUS
Status: DISCONTINUED | OUTPATIENT
Start: 2021-06-27 | End: 2021-06-30 | Stop reason: HOSPADM

## 2021-06-27 RX ORDER — POTASSIUM CHLORIDE 1.5 G/1.77G
40 POWDER, FOR SOLUTION ORAL AS NEEDED
Status: DISCONTINUED | OUTPATIENT
Start: 2021-06-27 | End: 2021-06-30 | Stop reason: HOSPADM

## 2021-06-27 RX ORDER — POTASSIUM CHLORIDE 750 MG/1
40 TABLET, FILM COATED, EXTENDED RELEASE ORAL AS NEEDED
Status: DISCONTINUED | OUTPATIENT
Start: 2021-06-27 | End: 2021-06-30 | Stop reason: HOSPADM

## 2021-06-27 RX ORDER — MAGNESIUM SULFATE HEPTAHYDRATE 40 MG/ML
2 INJECTION, SOLUTION INTRAVENOUS AS NEEDED
Status: DISCONTINUED | OUTPATIENT
Start: 2021-06-27 | End: 2021-06-30 | Stop reason: HOSPADM

## 2021-06-27 RX ORDER — POTASSIUM CHLORIDE 7.45 MG/ML
10 INJECTION INTRAVENOUS
Status: DISCONTINUED | OUTPATIENT
Start: 2021-06-27 | End: 2021-06-30 | Stop reason: HOSPADM

## 2021-06-27 RX ORDER — MAGNESIUM SULFATE HEPTAHYDRATE 40 MG/ML
4 INJECTION, SOLUTION INTRAVENOUS AS NEEDED
Status: DISCONTINUED | OUTPATIENT
Start: 2021-06-27 | End: 2021-06-30 | Stop reason: HOSPADM

## 2021-06-27 RX ADMIN — POTASSIUM CHLORIDE 10 MEQ: 7.46 INJECTION, SOLUTION INTRAVENOUS at 17:05

## 2021-06-27 RX ADMIN — POTASSIUM CHLORIDE 10 MEQ: 7.46 INJECTION, SOLUTION INTRAVENOUS at 22:09

## 2021-06-27 RX ADMIN — DEXTROSE MONOHYDRATE, SODIUM CHLORIDE, SODIUM LACTATE, POTASSIUM CHLORIDE, CALCIUM CHLORIDE 75 ML/HR: 5; 600; 310; 179; 20 INJECTION, SOLUTION INTRAVENOUS at 14:30

## 2021-06-27 RX ADMIN — DOCUSATE SODIUM 50MG AND SENNOSIDES 8.6MG 1 TABLET: 8.6; 5 TABLET, FILM COATED ORAL at 22:09

## 2021-06-27 RX ADMIN — MAGNESIUM HYDROXIDE,ALUMINUM HYDROXICE,SIMETHICONE 15 ML: 240; 2400; 2400 SUSPENSION ORAL at 05:22

## 2021-06-27 RX ADMIN — POTASSIUM CHLORIDE 10 MEQ: 7.46 INJECTION, SOLUTION INTRAVENOUS at 19:21

## 2021-06-27 RX ADMIN — GABAPENTIN 300 MG: 300 CAPSULE ORAL at 22:09

## 2021-06-27 RX ADMIN — ONDANSETRON 4 MG: 2 INJECTION INTRAMUSCULAR; INTRAVENOUS at 05:22

## 2021-06-27 RX ADMIN — POTASSIUM CHLORIDE 10 MEQ: 7.46 INJECTION, SOLUTION INTRAVENOUS at 15:25

## 2021-06-27 RX ADMIN — OXYCODONE 10 MG: 5 TABLET ORAL at 03:04

## 2021-06-27 RX ADMIN — HYDROMORPHONE HYDROCHLORIDE 0.5 MG: 1 INJECTION, SOLUTION INTRAMUSCULAR; INTRAVENOUS; SUBCUTANEOUS at 05:22

## 2021-06-27 RX ADMIN — HYDROMORPHONE HYDROCHLORIDE 0.5 MG: 1 INJECTION, SOLUTION INTRAMUSCULAR; INTRAVENOUS; SUBCUTANEOUS at 10:00

## 2021-06-27 RX ADMIN — IBUPROFEN 600 MG: 600 TABLET ORAL at 00:53

## 2021-06-27 RX ADMIN — POTASSIUM CHLORIDE 40 MEQ: 750 TABLET, EXTENDED RELEASE ORAL at 09:38

## 2021-06-27 RX ADMIN — ENOXAPARIN SODIUM 40 MG: 40 INJECTION SUBCUTANEOUS at 08:13

## 2021-06-27 RX ADMIN — SODIUM CHLORIDE, POTASSIUM CHLORIDE, SODIUM LACTATE AND CALCIUM CHLORIDE 125 ML/HR: 600; 310; 30; 20 INJECTION, SOLUTION INTRAVENOUS at 08:12

## 2021-06-27 RX ADMIN — ACETAMINOPHEN 1000 MG: 500 TABLET, FILM COATED ORAL at 22:09

## 2021-06-27 RX ADMIN — GABAPENTIN 300 MG: 300 CAPSULE ORAL at 05:22

## 2021-06-27 RX ADMIN — HYDROMORPHONE HYDROCHLORIDE 0.5 MG: 1 INJECTION, SOLUTION INTRAMUSCULAR; INTRAVENOUS; SUBCUTANEOUS at 18:24

## 2021-06-27 RX ADMIN — SIMETHICONE 80 MG: 80 TABLET, CHEWABLE ORAL at 02:18

## 2021-06-27 RX ADMIN — HYDROMORPHONE HYDROCHLORIDE 0.5 MG: 1 INJECTION, SOLUTION INTRAMUSCULAR; INTRAVENOUS; SUBCUTANEOUS at 02:18

## 2021-06-27 RX ADMIN — HYDROMORPHONE HYDROCHLORIDE 0.5 MG: 1 INJECTION, SOLUTION INTRAMUSCULAR; INTRAVENOUS; SUBCUTANEOUS at 13:02

## 2021-06-28 ENCOUNTER — APPOINTMENT (OUTPATIENT)
Dept: GENERAL RADIOLOGY | Facility: HOSPITAL | Age: 51
End: 2021-06-28

## 2021-06-28 PROBLEM — K56.7 ILEUS, POSTOPERATIVE: Status: ACTIVE | Noted: 2021-06-28

## 2021-06-28 PROBLEM — K91.89 ILEUS, POSTOPERATIVE: Status: ACTIVE | Noted: 2021-06-28

## 2021-06-28 PROBLEM — E83.39 HYPOPHOSPHATEMIA: Status: ACTIVE | Noted: 2021-06-28

## 2021-06-28 LAB
ANION GAP SERPL CALCULATED.3IONS-SCNC: 8.2 MMOL/L (ref 5–15)
BASOPHILS # BLD AUTO: 0.04 10*3/MM3 (ref 0–0.2)
BASOPHILS NFR BLD AUTO: 0.7 % (ref 0–1.5)
BUN SERPL-MCNC: 10 MG/DL (ref 6–20)
BUN/CREAT SERPL: 22.2 (ref 7–25)
CALCIUM SPEC-SCNC: 8.1 MG/DL (ref 8.6–10.5)
CHLORIDE SERPL-SCNC: 100 MMOL/L (ref 98–107)
CO2 SERPL-SCNC: 33.8 MMOL/L (ref 22–29)
CREAT SERPL-MCNC: 0.45 MG/DL (ref 0.57–1)
DEPRECATED RDW RBC AUTO: 45.9 FL (ref 37–54)
EOSINOPHIL # BLD AUTO: 0.51 10*3/MM3 (ref 0–0.4)
EOSINOPHIL NFR BLD AUTO: 8.4 % (ref 0.3–6.2)
ERYTHROCYTE [DISTWIDTH] IN BLOOD BY AUTOMATED COUNT: 13 % (ref 12.3–15.4)
GFR SERPL CREATININE-BSD FRML MDRD: 147 ML/MIN/1.73
GLUCOSE SERPL-MCNC: 101 MG/DL (ref 65–99)
HCT VFR BLD AUTO: 35.9 % (ref 34–46.6)
HGB BLD-MCNC: 12 G/DL (ref 12–15.9)
LYMPHOCYTES # BLD AUTO: 0.58 10*3/MM3 (ref 0.7–3.1)
LYMPHOCYTES NFR BLD AUTO: 9.6 % (ref 19.6–45.3)
MCH RBC QN AUTO: 31.8 PG (ref 26.6–33)
MCHC RBC AUTO-ENTMCNC: 33.4 G/DL (ref 31.5–35.7)
MCV RBC AUTO: 95.2 FL (ref 79–97)
MONOCYTES # BLD AUTO: 0.89 10*3/MM3 (ref 0.1–0.9)
MONOCYTES NFR BLD AUTO: 14.7 % (ref 5–12)
NEUTROPHILS NFR BLD AUTO: 3.99 10*3/MM3 (ref 1.7–7)
NEUTROPHILS NFR BLD AUTO: 65.9 % (ref 42.7–76)
PHOSPHATE SERPL-MCNC: 1.5 MG/DL (ref 2.5–4.5)
PLATELET # BLD AUTO: 348 10*3/MM3 (ref 140–450)
PMV BLD AUTO: 9.9 FL (ref 6–12)
POTASSIUM SERPL-SCNC: 3 MMOL/L (ref 3.5–5.2)
RBC # BLD AUTO: 3.77 10*6/MM3 (ref 3.77–5.28)
SODIUM SERPL-SCNC: 142 MMOL/L (ref 136–145)
WBC # BLD AUTO: 6.05 10*3/MM3 (ref 3.4–10.8)

## 2021-06-28 PROCEDURE — 97530 THERAPEUTIC ACTIVITIES: CPT

## 2021-06-28 PROCEDURE — 99253 IP/OBS CNSLTJ NEW/EST LOW 45: CPT | Performed by: STUDENT IN AN ORGANIZED HEALTH CARE EDUCATION/TRAINING PROGRAM

## 2021-06-28 PROCEDURE — 85025 COMPLETE CBC W/AUTO DIFF WBC: CPT | Performed by: NURSE PRACTITIONER

## 2021-06-28 PROCEDURE — 25010000002 ENOXAPARIN PER 10 MG: Performed by: STUDENT IN AN ORGANIZED HEALTH CARE EDUCATION/TRAINING PROGRAM

## 2021-06-28 PROCEDURE — 25010000003 POTASSIUM CHLORIDE 10 MEQ/100ML SOLUTION: Performed by: NURSE PRACTITIONER

## 2021-06-28 PROCEDURE — 84100 ASSAY OF PHOSPHORUS: CPT | Performed by: NURSE PRACTITIONER

## 2021-06-28 PROCEDURE — 74018 RADEX ABDOMEN 1 VIEW: CPT

## 2021-06-28 PROCEDURE — 80048 BASIC METABOLIC PNL TOTAL CA: CPT | Performed by: NURSE PRACTITIONER

## 2021-06-28 RX ORDER — KETOROLAC TROMETHAMINE 30 MG/ML
15 INJECTION, SOLUTION INTRAMUSCULAR; INTRAVENOUS EVERY 6 HOURS
Status: DISCONTINUED | OUTPATIENT
Start: 2021-06-28 | End: 2021-06-28

## 2021-06-28 RX ORDER — ACETAMINOPHEN 650 MG/1
650 SUPPOSITORY RECTAL EVERY 6 HOURS
Status: DISCONTINUED | OUTPATIENT
Start: 2021-06-28 | End: 2021-06-29

## 2021-06-28 RX ORDER — POLYETHYLENE GLYCOL 3350 17 G/17G
17 POWDER, FOR SOLUTION ORAL DAILY
Status: DISCONTINUED | OUTPATIENT
Start: 2021-06-28 | End: 2021-06-30 | Stop reason: HOSPADM

## 2021-06-28 RX ADMIN — DEXTROSE MONOHYDRATE, SODIUM CHLORIDE, SODIUM LACTATE, POTASSIUM CHLORIDE, CALCIUM CHLORIDE 75 ML/HR: 5; 600; 310; 179; 20 INJECTION, SOLUTION INTRAVENOUS at 00:30

## 2021-06-28 RX ADMIN — IBUPROFEN 600 MG: 600 TABLET ORAL at 00:30

## 2021-06-28 RX ADMIN — TEMAZEPAM 15 MG: 7.5 CAPSULE ORAL at 23:40

## 2021-06-28 RX ADMIN — POTASSIUM CHLORIDE 10 MEQ: 7.46 INJECTION, SOLUTION INTRAVENOUS at 17:34

## 2021-06-28 RX ADMIN — POTASSIUM CHLORIDE 10 MEQ: 7.46 INJECTION, SOLUTION INTRAVENOUS at 23:23

## 2021-06-28 RX ADMIN — ACETAMINOPHEN 1000 MG: 500 TABLET, FILM COATED ORAL at 05:41

## 2021-06-28 RX ADMIN — GABAPENTIN 300 MG: 300 CAPSULE ORAL at 06:16

## 2021-06-28 RX ADMIN — POLYETHYLENE GLYCOL 3350 17 G: 17 POWDER, FOR SOLUTION ORAL at 19:07

## 2021-06-28 RX ADMIN — ACETAMINOPHEN 650 MG: 650 SUPPOSITORY RECTAL at 17:39

## 2021-06-28 RX ADMIN — DOCUSATE SODIUM 50MG AND SENNOSIDES 8.6MG 1 TABLET: 8.6; 5 TABLET, FILM COATED ORAL at 19:57

## 2021-06-28 RX ADMIN — ACETAMINOPHEN 650 MG: 650 SUPPOSITORY RECTAL at 23:25

## 2021-06-28 RX ADMIN — POTASSIUM PHOSPHATE, MONOBASIC AND POTASSIUM PHOSPHATE, DIBASIC 30 MMOL: 224; 236 INJECTION, SOLUTION, CONCENTRATE INTRAVENOUS at 12:08

## 2021-06-28 RX ADMIN — POTASSIUM CHLORIDE 10 MEQ: 7.46 INJECTION, SOLUTION INTRAVENOUS at 12:08

## 2021-06-28 RX ADMIN — DEXTROSE MONOHYDRATE, SODIUM CHLORIDE, SODIUM LACTATE, POTASSIUM CHLORIDE, CALCIUM CHLORIDE 50 ML/HR: 5; 600; 310; 179; 20 INJECTION, SOLUTION INTRAVENOUS at 16:38

## 2021-06-28 RX ADMIN — ACETAMINOPHEN 650 MG: 650 SUPPOSITORY RECTAL at 12:05

## 2021-06-28 RX ADMIN — POTASSIUM CHLORIDE 10 MEQ: 7.46 INJECTION, SOLUTION INTRAVENOUS at 15:09

## 2021-06-28 RX ADMIN — POTASSIUM CHLORIDE 10 MEQ: 7.46 INJECTION, SOLUTION INTRAVENOUS at 21:05

## 2021-06-28 RX ADMIN — ENOXAPARIN SODIUM 40 MG: 40 INJECTION SUBCUTANEOUS at 09:32

## 2021-06-28 RX ADMIN — IBUPROFEN 600 MG: 600 TABLET ORAL at 06:16

## 2021-06-28 RX ADMIN — GABAPENTIN 300 MG: 300 CAPSULE ORAL at 19:57

## 2021-06-29 LAB
ANION GAP SERPL CALCULATED.3IONS-SCNC: 10 MMOL/L (ref 5–15)
BUN SERPL-MCNC: 7 MG/DL (ref 6–20)
BUN/CREAT SERPL: 20.6 (ref 7–25)
CALCIUM SPEC-SCNC: 7.8 MG/DL (ref 8.6–10.5)
CHLORIDE SERPL-SCNC: 102 MMOL/L (ref 98–107)
CO2 SERPL-SCNC: 29 MMOL/L (ref 22–29)
CREAT SERPL-MCNC: 0.34 MG/DL (ref 0.57–1)
DEPRECATED RDW RBC AUTO: 43.2 FL (ref 37–54)
ERYTHROCYTE [DISTWIDTH] IN BLOOD BY AUTOMATED COUNT: 13 % (ref 12.3–15.4)
GFR SERPL CREATININE-BSD FRML MDRD: >150 ML/MIN/1.73
GLUCOSE SERPL-MCNC: 85 MG/DL (ref 65–99)
HCT VFR BLD AUTO: 35.3 % (ref 34–46.6)
HGB BLD-MCNC: 11.4 G/DL (ref 12–15.9)
MCH RBC QN AUTO: 29.8 PG (ref 26.6–33)
MCHC RBC AUTO-ENTMCNC: 32.3 G/DL (ref 31.5–35.7)
MCV RBC AUTO: 92.2 FL (ref 79–97)
PHOSPHATE SERPL-MCNC: 2.2 MG/DL (ref 2.5–4.5)
PLATELET # BLD AUTO: 366 10*3/MM3 (ref 140–450)
PMV BLD AUTO: 9.8 FL (ref 6–12)
POTASSIUM SERPL-SCNC: 3.6 MMOL/L (ref 3.5–5.2)
RBC # BLD AUTO: 3.83 10*6/MM3 (ref 3.77–5.28)
SODIUM SERPL-SCNC: 141 MMOL/L (ref 136–145)
WBC # BLD AUTO: 7.99 10*3/MM3 (ref 3.4–10.8)

## 2021-06-29 PROCEDURE — 99232 SBSQ HOSP IP/OBS MODERATE 35: CPT | Performed by: STUDENT IN AN ORGANIZED HEALTH CARE EDUCATION/TRAINING PROGRAM

## 2021-06-29 PROCEDURE — 25010000003 POTASSIUM CHLORIDE 10 MEQ/100ML SOLUTION: Performed by: NURSE PRACTITIONER

## 2021-06-29 PROCEDURE — 25010000002 ENOXAPARIN PER 10 MG: Performed by: STUDENT IN AN ORGANIZED HEALTH CARE EDUCATION/TRAINING PROGRAM

## 2021-06-29 PROCEDURE — 84100 ASSAY OF PHOSPHORUS: CPT | Performed by: INTERNAL MEDICINE

## 2021-06-29 PROCEDURE — 85027 COMPLETE CBC AUTOMATED: CPT | Performed by: NURSE PRACTITIONER

## 2021-06-29 PROCEDURE — 80048 BASIC METABOLIC PNL TOTAL CA: CPT | Performed by: NURSE PRACTITIONER

## 2021-06-29 RX ORDER — ACETAMINOPHEN 500 MG
1000 TABLET ORAL EVERY 8 HOURS
Status: DISCONTINUED | OUTPATIENT
Start: 2021-06-29 | End: 2021-06-30 | Stop reason: HOSPADM

## 2021-06-29 RX ORDER — BISACODYL 10 MG
10 SUPPOSITORY, RECTAL RECTAL DAILY
Status: DISCONTINUED | OUTPATIENT
Start: 2021-06-29 | End: 2021-06-30 | Stop reason: HOSPADM

## 2021-06-29 RX ADMIN — ACETAMINOPHEN 1000 MG: 500 TABLET, FILM COATED ORAL at 14:41

## 2021-06-29 RX ADMIN — ACETAMINOPHEN 1000 MG: 500 TABLET, FILM COATED ORAL at 22:49

## 2021-06-29 RX ADMIN — POTASSIUM CHLORIDE 10 MEQ: 7.46 INJECTION, SOLUTION INTRAVENOUS at 01:35

## 2021-06-29 RX ADMIN — GABAPENTIN 300 MG: 300 CAPSULE ORAL at 05:22

## 2021-06-29 RX ADMIN — DEXTROSE MONOHYDRATE, SODIUM CHLORIDE, SODIUM LACTATE, POTASSIUM CHLORIDE, CALCIUM CHLORIDE 50 ML/HR: 5; 600; 310; 179; 20 INJECTION, SOLUTION INTRAVENOUS at 14:40

## 2021-06-29 RX ADMIN — BISACODYL 10 MG: 10 SUPPOSITORY RECTAL at 20:12

## 2021-06-29 RX ADMIN — POLYETHYLENE GLYCOL 3350 17 G: 17 POWDER, FOR SOLUTION ORAL at 09:28

## 2021-06-29 RX ADMIN — DOCUSATE SODIUM 50MG AND SENNOSIDES 8.6MG 1 TABLET: 8.6; 5 TABLET, FILM COATED ORAL at 20:12

## 2021-06-29 RX ADMIN — ACETAMINOPHEN 650 MG: 650 SUPPOSITORY RECTAL at 05:21

## 2021-06-29 RX ADMIN — TEMAZEPAM 15 MG: 7.5 CAPSULE ORAL at 22:52

## 2021-06-29 RX ADMIN — GABAPENTIN 300 MG: 300 CAPSULE ORAL at 13:09

## 2021-06-29 RX ADMIN — ENOXAPARIN SODIUM 40 MG: 40 INJECTION SUBCUTANEOUS at 09:28

## 2021-06-29 RX ADMIN — DOCUSATE SODIUM 50MG AND SENNOSIDES 8.6MG 1 TABLET: 8.6; 5 TABLET, FILM COATED ORAL at 09:28

## 2021-06-29 RX ADMIN — POTASSIUM PHOSPHATE, MONOBASIC AND POTASSIUM PHOSPHATE, DIBASIC 15 MMOL: 224; 236 INJECTION, SOLUTION, CONCENTRATE INTRAVENOUS at 13:08

## 2021-06-29 RX ADMIN — GABAPENTIN 300 MG: 300 CAPSULE ORAL at 22:49

## 2021-06-30 ENCOUNTER — READMISSION MANAGEMENT (OUTPATIENT)
Dept: CALL CENTER | Facility: HOSPITAL | Age: 51
End: 2021-06-30

## 2021-06-30 VITALS
RESPIRATION RATE: 16 BRPM | SYSTOLIC BLOOD PRESSURE: 135 MMHG | OXYGEN SATURATION: 96 % | DIASTOLIC BLOOD PRESSURE: 84 MMHG | HEART RATE: 67 BPM | TEMPERATURE: 100 F

## 2021-06-30 LAB
ALBUMIN SERPL-MCNC: 2.6 G/DL (ref 3.5–5.2)
ALBUMIN/GLOB SERPL: 1.1 G/DL
ALP SERPL-CCNC: 171 U/L (ref 39–117)
ALT SERPL W P-5'-P-CCNC: 41 U/L (ref 1–33)
ANION GAP SERPL CALCULATED.3IONS-SCNC: 7.5 MMOL/L (ref 5–15)
AST SERPL-CCNC: 46 U/L (ref 1–32)
BILIRUB SERPL-MCNC: 0.3 MG/DL (ref 0–1.2)
BUN SERPL-MCNC: 6 MG/DL (ref 6–20)
BUN/CREAT SERPL: 12.2 (ref 7–25)
CALCIUM SPEC-SCNC: 7.9 MG/DL (ref 8.6–10.5)
CHLORIDE SERPL-SCNC: 104 MMOL/L (ref 98–107)
CO2 SERPL-SCNC: 26.5 MMOL/L (ref 22–29)
CREAT SERPL-MCNC: 0.49 MG/DL (ref 0.57–1)
DEPRECATED RDW RBC AUTO: 46.2 FL (ref 37–54)
EOSINOPHIL # BLD MANUAL: 1.07 10*3/MM3 (ref 0–0.4)
EOSINOPHIL NFR BLD MANUAL: 11.2 % (ref 0.3–6.2)
ERYTHROCYTE [DISTWIDTH] IN BLOOD BY AUTOMATED COUNT: 13.3 % (ref 12.3–15.4)
GFR SERPL CREATININE-BSD FRML MDRD: 134 ML/MIN/1.73
GLOBULIN UR ELPH-MCNC: 2.3 GM/DL
GLUCOSE SERPL-MCNC: 91 MG/DL (ref 65–99)
HCT VFR BLD AUTO: 35.2 % (ref 34–46.6)
HGB BLD-MCNC: 11.7 G/DL (ref 12–15.9)
LYMPHOCYTES # BLD MANUAL: 0.98 10*3/MM3 (ref 0.7–3.1)
LYMPHOCYTES NFR BLD MANUAL: 10.2 % (ref 19.6–45.3)
LYMPHOCYTES NFR BLD MANUAL: 2 % (ref 5–12)
MAGNESIUM SERPL-MCNC: 1.8 MG/DL (ref 1.6–2.6)
MCH RBC QN AUTO: 31.4 PG (ref 26.6–33)
MCHC RBC AUTO-ENTMCNC: 33.2 G/DL (ref 31.5–35.7)
MCV RBC AUTO: 94.4 FL (ref 79–97)
MONOCYTES # BLD AUTO: 0.19 10*3/MM3 (ref 0.1–0.9)
NEUTROPHILS # BLD AUTO: 7.32 10*3/MM3 (ref 1.7–7)
NEUTROPHILS NFR BLD MANUAL: 76.5 % (ref 42.7–76)
PHOSPHATE SERPL-MCNC: 2.4 MG/DL (ref 2.5–4.5)
PLAT MORPH BLD: NORMAL
PLATELET # BLD AUTO: 356 10*3/MM3 (ref 140–450)
PMV BLD AUTO: 9.3 FL (ref 6–12)
POTASSIUM SERPL-SCNC: 4.2 MMOL/L (ref 3.5–5.2)
PROT SERPL-MCNC: 4.9 G/DL (ref 6–8.5)
RBC # BLD AUTO: 3.73 10*6/MM3 (ref 3.77–5.28)
RBC MORPH BLD: NORMAL
SODIUM SERPL-SCNC: 138 MMOL/L (ref 136–145)
WBC # BLD AUTO: 9.57 10*3/MM3 (ref 3.4–10.8)
WBC MORPH BLD: NORMAL

## 2021-06-30 PROCEDURE — 25010000002 ENOXAPARIN PER 10 MG: Performed by: STUDENT IN AN ORGANIZED HEALTH CARE EDUCATION/TRAINING PROGRAM

## 2021-06-30 PROCEDURE — 85007 BL SMEAR W/DIFF WBC COUNT: CPT | Performed by: STUDENT IN AN ORGANIZED HEALTH CARE EDUCATION/TRAINING PROGRAM

## 2021-06-30 PROCEDURE — 83735 ASSAY OF MAGNESIUM: CPT | Performed by: STUDENT IN AN ORGANIZED HEALTH CARE EDUCATION/TRAINING PROGRAM

## 2021-06-30 PROCEDURE — 80053 COMPREHEN METABOLIC PANEL: CPT | Performed by: NURSE PRACTITIONER

## 2021-06-30 PROCEDURE — 85027 COMPLETE CBC AUTOMATED: CPT | Performed by: STUDENT IN AN ORGANIZED HEALTH CARE EDUCATION/TRAINING PROGRAM

## 2021-06-30 PROCEDURE — 99232 SBSQ HOSP IP/OBS MODERATE 35: CPT | Performed by: STUDENT IN AN ORGANIZED HEALTH CARE EDUCATION/TRAINING PROGRAM

## 2021-06-30 PROCEDURE — 84100 ASSAY OF PHOSPHORUS: CPT | Performed by: STUDENT IN AN ORGANIZED HEALTH CARE EDUCATION/TRAINING PROGRAM

## 2021-06-30 RX ADMIN — ENOXAPARIN SODIUM 40 MG: 40 INJECTION SUBCUTANEOUS at 09:43

## 2021-06-30 RX ADMIN — DOCUSATE SODIUM 50MG AND SENNOSIDES 8.6MG 1 TABLET: 8.6; 5 TABLET, FILM COATED ORAL at 09:44

## 2021-06-30 RX ADMIN — GABAPENTIN 300 MG: 300 CAPSULE ORAL at 06:34

## 2021-06-30 RX ADMIN — ACETAMINOPHEN 1000 MG: 500 TABLET, FILM COATED ORAL at 06:34

## 2021-06-30 RX ADMIN — POLYETHYLENE GLYCOL 3350 17 G: 17 POWDER, FOR SOLUTION ORAL at 09:44

## 2021-06-30 NOTE — OUTREACH NOTE
Prep Survey      Responses   [facility] facility patient discharged from?  [place]   Is LACE score < 7 ?  No   Emergency Room discharge w/ pulse ox?  No   Eligibility  [facility]   Date of Admission  06/23/21   Date of Discharge  06/30/21   Discharge Disposition  Home or Self Care   Discharge diagnosis  TOTAL ABDOMINAL HYSTERECTOMY   Does the patient have one of the following disease processes/diagnoses(primary or secondary)?  General Surgery   Does the patient have Home health ordered?  No   Is there a DME ordered?  No   Prep survey completed?  Yes          Melissa Hermosillo RN

## 2021-07-01 ENCOUNTER — TRANSITIONAL CARE MANAGEMENT TELEPHONE ENCOUNTER (OUTPATIENT)
Dept: CALL CENTER | Facility: HOSPITAL | Age: 51
End: 2021-07-01

## 2021-07-01 NOTE — OUTREACH NOTE
Call Center TCM Note      Responses   [facility] patient discharged from?  [place]   Does the patient have one of the following disease processes/diagnoses(primary or secondary)?  General Surgery   TCM attempt successful?  No   Unsuccessful attempts  Attempt 1          Melissa Rios MA    7/1/2021, 12:00 EDT

## 2021-07-01 NOTE — OUTREACH NOTE
Call Center TCM Note      Responses   [facility] patient discharged from?  [place]   Does the patient have one of the following disease processes/diagnoses(primary or secondary)?  General Surgery   TCM attempt successful?  No   Unsuccessful attempts  Attempt 2          Melissa Rios MA    7/1/2021, 15:09 EDT

## 2021-07-02 ENCOUNTER — TRANSITIONAL CARE MANAGEMENT TELEPHONE ENCOUNTER (OUTPATIENT)
Dept: CALL CENTER | Facility: HOSPITAL | Age: 51
End: 2021-07-02

## 2021-07-02 NOTE — OUTREACH NOTE
Call Center TCM Note      Responses   [facility] patient discharged from?  [place]   Does the patient have one of the following disease processes/diagnoses(primary or secondary)?  General Surgery   TCM attempt successful?  Yes [Charisma-mother]   Call start time  0838   Call end time  0847   Discharge diagnosis  TOTAL ABDOMINAL HYSTERECTOMY   Person spoke with today (if not patient) and relationship  Charisma-mother and patient    Meds reviewed with patient/caregiver?  Yes   Is the patient having any side effects they believe may be caused by any medication additions or changes?  No   Does the patient have all medications related to this admission filled (includes all antibiotics, pain medications, etc.)  N/A   Is the patient taking all medications as directed (includes completed medication regime)?  Yes   Does the patient have a follow up appointment scheduled with their surgeon?  No   What is preventing the patient from scheduling follow up appointments?  Waiting on return call   Nursing Interventions  Verified appointment date/time/provider   Has the patient kept scheduled appointments due by today?  N/A   Comments  Appt with oncology is on 7/14/21,  Hospital d/c f/u video visit is on 7/8/21 at 4:00 pm    Psychosocial issues?  No   Did the patient receive a copy of their discharge instructions?  Yes   Nursing interventions  Reviewed instructions with patient   What is the patient's perception of their health status since discharge?  Improving   Nursing interventions  Nurse provided patient education   Is the patient /caregiver able to teach back basic post-op care?  Lifting as instructed by MD in discharge instructions, Keep incision areas clean,dry and protected, Practice 'cough and deep breath'   Is the patient/caregiver able to teach back signs and symptoms of incisional infection?  Fever, Increased drainage or bleeding   Is the patient/caregiver able to teach back steps to recovery at home?  Set small,  achievable goals for return to baseline health, Rest and rebuild strength, gradually increase activity   If the patient is a current smoker, are they able to teach back resources for cessation?  Not a smoker   Is the patient/caregiver able to teach back the hierarchy of who to call/visit for symptoms/problems? PCP, Specialist, Home health nurse, Urgent Care, ED, 911  Yes   TCM call completed?  Yes          Sonia Ozuna RN    7/2/2021, 08:47 EDT

## 2021-07-08 ENCOUNTER — TELEMEDICINE (OUTPATIENT)
Dept: FAMILY MEDICINE CLINIC | Facility: CLINIC | Age: 51
End: 2021-07-08

## 2021-07-08 DIAGNOSIS — D25.2 INTRAMURAL AND SUBSEROUS LEIOMYOMA OF UTERUS: Primary | ICD-10-CM

## 2021-07-08 DIAGNOSIS — K56.7 ILEUS, POSTOPERATIVE (HCC): ICD-10-CM

## 2021-07-08 DIAGNOSIS — D25.1 INTRAMURAL AND SUBSEROUS LEIOMYOMA OF UTERUS: Primary | ICD-10-CM

## 2021-07-08 DIAGNOSIS — R10.2 PELVIC PRESSURE IN FEMALE: ICD-10-CM

## 2021-07-08 DIAGNOSIS — K91.89 ILEUS, POSTOPERATIVE (HCC): ICD-10-CM

## 2021-07-08 PROCEDURE — 99214 OFFICE O/P EST MOD 30 MIN: CPT | Performed by: NURSE PRACTITIONER

## 2021-07-08 NOTE — PATIENT INSTRUCTIONS
Hospital records reviewed,   She will cont f/u with surgeon next week as scheduled.   Symptoms improved today.   Patient agrees with plan of care and understands instructions. Call if worsening symptoms or any problems or concerns.

## 2021-07-08 NOTE — PROGRESS NOTES
Transitional Care Follow Up Visit  Subjective     Angélica Lopez is a 51 y.o. female who presents for a transitional care management visit.    Within 48 business hours after discharge our office contacted her via telephone to coordinate her care and needs.      I reviewed and discussed the details of that call along with the discharge summary, hospital problems, inpatient lab results, inpatient diagnostic studies, and consultation reports with Angélica.     Current outpatient and discharge medications have been reconciled for the patient.  Reviewed by: CRISTA Mann      Date of TCM Phone Call 6/30/2021   [facility]   Date of Admission 6/23/2021   Date of Discharge 6/30/2021   Discharge Disposition Home or Self Care     Risk for Readmission (LACE) Score: 8 (6/30/2021  6:01 AM)      History of Present Illness   Course During Hospital Stay:  6/23/2021-6/30/2021  You have chosen to receive care through a telehealth visit.  Do you consent to use a video/audio connection for your medical care today? Yes  Time spent during visit, 10 minutes.   Visit today for hospital f/u, she had hysterectomy and right oophorectomy 6/23/2021 with GYN Dr. Chapa, please refer to H&P and d/c sarah for details. She had bowel adhesions, and also had lysis of adhesions with gyn onc. She also had ileus that resolved with NG tube management, hx of thrombosis and resumed eliquis, she states she is feeling much better today, she has f/u with GYN Dr. Chapa next week. She is taking tylenol OTC for pain, tolerating well. She states appetite is good. She states she had reaction to anesthesia. She has hx of spina bifida, walks with crutches and walker, states she felt heaviness in upper legs after she returned home from hospital, she states now resolved, she is unsure of block or epidural. She denies urinary symptoms.      The following portions of the patient's history were reviewed and updated as appropriate: allergies, current  medications, past family history, past medical history, past social history, past surgical history and problem list.    Review of Systems    Objective   Physical Exam  Constitutional:       Appearance: Normal appearance.   HENT:      Head: Normocephalic.      Nose: Nose normal.   Eyes:      Pupils: Pupils are equal, round, and reactive to light.   Pulmonary:      Effort: Pulmonary effort is normal.   Neurological:      Mental Status: She is alert and oriented to person, place, and time.   Psychiatric:         Mood and Affect: Mood normal.         Behavior: Behavior normal.      physical exam limited d/t video visit.     Assessment/Plan   Diagnoses and all orders for this visit:    1. Intramural and subserous leiomyoma of uterus (Primary)    2. Ileus, postoperative (CMS/HCC)    3. Pelvic pressure in female        Hospital records reviewed,   She will cont f/u with surgeon next week as scheduled.   Symptoms improved today.   Patient agrees with plan of care and understands instructions. Call if worsening symptoms or any problems or concerns.

## 2021-07-09 ENCOUNTER — TELEPHONE (OUTPATIENT)
Dept: OBSTETRICS AND GYNECOLOGY | Facility: CLINIC | Age: 51
End: 2021-07-09

## 2021-07-09 ENCOUNTER — READMISSION MANAGEMENT (OUTPATIENT)
Dept: CALL CENTER | Facility: HOSPITAL | Age: 51
End: 2021-07-09

## 2021-07-09 DIAGNOSIS — I82.4Y3 DVT, LOWER EXTREMITY, PROXIMAL, ACUTE, BILATERAL (HCC): Primary | ICD-10-CM

## 2021-07-09 DIAGNOSIS — D50.0 IRON DEFICIENCY ANEMIA DUE TO CHRONIC BLOOD LOSS: ICD-10-CM

## 2021-07-09 NOTE — TELEPHONE ENCOUNTER
Pt called had a hyst on 6/23 stated that she is starting to have bloody discharge from incision site that started yesterday. She has started to get up more and move around which caused her to notice this. Stated lower abd sutures were getting loose. Wanted to know what you advise her to do if she should bandage or just keep watching. Has appt with you on Tuesday. Thank you

## 2021-07-09 NOTE — OUTREACH NOTE
General Surgery Week 2 Survey      Responses   [facility] patient discharged from?  [place]   Does the patient have one of the following disease processes/diagnoses(primary or secondary)?  General Surgery   Week 2 attempt successful?  Yes   Call start time  1550   Call end time  1558   Discharge diagnosis  TOTAL ABDOMINAL HYSTERECTOMY   Meds reviewed with patient/caregiver?  Yes   Is the patient having any side effects they believe may be caused by any medication additions or changes?  No   Does the patient have all medications related to this admission filled (includes all antibiotics, pain medications, etc.)  N/A   Is the patient taking all medications as directed (includes completed medication regime)?  Yes   Does the patient have a follow up appointment scheduled with their surgeon?  Yes   Has the patient kept scheduled appointments due by today?  Yes   Comments  PCP yesterday.   Has home health visited the patient within 72 hours of discharge?  N/A   Psychosocial issues?  No   Did the patient receive a copy of their discharge instructions?  Yes   Nursing interventions  Reviewed instructions with patient   What is the patient's perception of their health status since discharge?  Improving   Nursing interventions  Nurse provided patient education   Is the patient /caregiver able to teach back basic post-op care?  Continue use of incentive spirometry at least 1 week post discharge, Practice 'cough and deep breath', Drive as instructed by MD in discharge instructions, No tub bath, swimming, or hot tub until instructed by MD, Take showers only when approved by MD-sponge bathe until then, Keep incision areas clean,dry and protected, Do not remove steri-strips, Lifting as instructed by MD in discharge instructions   Is the patient/caregiver able to teach back signs and symptoms of incisional infection?  Increased redness, swelling or pain at the incisonal site, Increased drainage or bleeding, Incisional  warmth, Pus or odor from incision, Fever   Is the patient/caregiver able to teach back steps to recovery at home?  Set small, achievable goals for return to baseline health, Rest and rebuild strength, gradually increase activity, Eat a well-balance diet, Make a list of questions for surgeon's appointment   If the patient is a current smoker, are they able to teach back resources for cessation?  Not a smoker   Is the patient/caregiver able to teach back the hierarchy of who to call/visit for symptoms/problems? PCP, Specialist, Home health nurse, Urgent Care, ED, 911  Yes   Additional teach back comments  some drainage at bottom of incision, resting, She is back to work, encouraged position changes and supporting abdomen.   Wrap up additional comments  Improving.          Shahnaz Gomes RN

## 2021-07-13 ENCOUNTER — OFFICE VISIT (OUTPATIENT)
Dept: OBSTETRICS AND GYNECOLOGY | Facility: CLINIC | Age: 51
End: 2021-07-13

## 2021-07-13 VITALS
SYSTOLIC BLOOD PRESSURE: 130 MMHG | WEIGHT: 140 LBS | DIASTOLIC BLOOD PRESSURE: 90 MMHG | BODY MASS INDEX: 29.39 KG/M2 | HEIGHT: 58 IN

## 2021-07-13 DIAGNOSIS — T81.31XA SUPERFICIAL DISRUPTION OR DEHISCENCE OF OPERATION WOUND, INITIAL ENCOUNTER: ICD-10-CM

## 2021-07-13 DIAGNOSIS — Z09 POSTOPERATIVE FOLLOW-UP: Primary | ICD-10-CM

## 2021-07-13 PROCEDURE — 99024 POSTOP FOLLOW-UP VISIT: CPT | Performed by: STUDENT IN AN ORGANIZED HEALTH CARE EDUCATION/TRAINING PROGRAM

## 2021-07-14 ENCOUNTER — LAB (OUTPATIENT)
Dept: LAB | Facility: HOSPITAL | Age: 51
End: 2021-07-14

## 2021-07-14 ENCOUNTER — OFFICE VISIT (OUTPATIENT)
Dept: ONCOLOGY | Facility: CLINIC | Age: 51
End: 2021-07-14

## 2021-07-14 VITALS
OXYGEN SATURATION: 96 % | RESPIRATION RATE: 16 BRPM | HEART RATE: 99 BPM | SYSTOLIC BLOOD PRESSURE: 133 MMHG | DIASTOLIC BLOOD PRESSURE: 86 MMHG | HEIGHT: 58 IN | BODY MASS INDEX: 29.27 KG/M2 | TEMPERATURE: 98.4 F

## 2021-07-14 DIAGNOSIS — Z79.01 CHRONIC ANTICOAGULATION: ICD-10-CM

## 2021-07-14 DIAGNOSIS — D50.0 IRON DEFICIENCY ANEMIA DUE TO CHRONIC BLOOD LOSS: ICD-10-CM

## 2021-07-14 DIAGNOSIS — D68.51 FACTOR 5 LEIDEN MUTATION, HETEROZYGOUS (HCC): Primary | ICD-10-CM

## 2021-07-14 DIAGNOSIS — I82.4Y3 DVT, LOWER EXTREMITY, PROXIMAL, ACUTE, BILATERAL (HCC): Primary | ICD-10-CM

## 2021-07-14 DIAGNOSIS — I82.4Y3 DVT, LOWER EXTREMITY, PROXIMAL, ACUTE, BILATERAL (HCC): ICD-10-CM

## 2021-07-14 LAB
ALBUMIN SERPL-MCNC: 3.4 G/DL (ref 3.5–5.2)
ALBUMIN/GLOB SERPL: 0.9 G/DL (ref 1.1–2.4)
ALP SERPL-CCNC: 332 U/L (ref 38–116)
ALT SERPL W P-5'-P-CCNC: 30 U/L (ref 0–33)
ANION GAP SERPL CALCULATED.3IONS-SCNC: 10.5 MMOL/L (ref 5–15)
AST SERPL-CCNC: 18 U/L (ref 0–32)
BASOPHILS # BLD AUTO: 0.04 10*3/MM3 (ref 0–0.2)
BASOPHILS NFR BLD AUTO: 0.4 % (ref 0–1.5)
BILIRUB SERPL-MCNC: 0.3 MG/DL (ref 0.2–1.2)
BUN SERPL-MCNC: 4 MG/DL (ref 6–20)
BUN/CREAT SERPL: 8 (ref 7.3–30)
CALCIUM SPEC-SCNC: 9.3 MG/DL (ref 8.5–10.2)
CHLORIDE SERPL-SCNC: 103 MMOL/L (ref 98–107)
CO2 SERPL-SCNC: 28.5 MMOL/L (ref 22–29)
CREAT SERPL-MCNC: 0.5 MG/DL (ref 0.6–1.1)
DEPRECATED RDW RBC AUTO: 47.7 FL (ref 37–54)
EOSINOPHIL # BLD AUTO: 0.72 10*3/MM3 (ref 0–0.4)
EOSINOPHIL NFR BLD AUTO: 6.7 % (ref 0.3–6.2)
ERYTHROCYTE [DISTWIDTH] IN BLOOD BY AUTOMATED COUNT: 13.8 % (ref 12.3–15.4)
FERRITIN SERPL-MCNC: 143.3 NG/ML (ref 11–207)
GFR SERPL CREATININE-BSD FRML MDRD: 131 ML/MIN/1.73
GLOBULIN UR ELPH-MCNC: 4 GM/DL (ref 1.8–3.5)
GLUCOSE SERPL-MCNC: 113 MG/DL (ref 74–124)
HCT VFR BLD AUTO: 37 % (ref 34–46.6)
HGB BLD-MCNC: 11.7 G/DL (ref 12–15.9)
HGB RETIC QN AUTO: 30.8 PG (ref 29.8–36.1)
IMM GRANULOCYTES # BLD AUTO: 0.12 10*3/MM3 (ref 0–0.05)
IMM GRANULOCYTES NFR BLD AUTO: 1.1 % (ref 0–0.5)
IMM RETICS NFR: 19.2 % (ref 3–15.8)
IRON 24H UR-MRATE: 29 MCG/DL (ref 37–145)
IRON SATN MFR SERPL: 12 % (ref 14–48)
LYMPHOCYTES # BLD AUTO: 1.25 10*3/MM3 (ref 0.7–3.1)
LYMPHOCYTES NFR BLD AUTO: 11.6 % (ref 19.6–45.3)
MCH RBC QN AUTO: 29.8 PG (ref 26.6–33)
MCHC RBC AUTO-ENTMCNC: 31.6 G/DL (ref 31.5–35.7)
MCV RBC AUTO: 94.4 FL (ref 79–97)
MONOCYTES # BLD AUTO: 0.59 10*3/MM3 (ref 0.1–0.9)
MONOCYTES NFR BLD AUTO: 5.5 % (ref 5–12)
NEUTROPHILS NFR BLD AUTO: 74.7 % (ref 42.7–76)
NEUTROPHILS NFR BLD AUTO: 8.01 10*3/MM3 (ref 1.7–7)
NRBC BLD AUTO-RTO: 0 /100 WBC (ref 0–0.2)
PLATELET # BLD AUTO: 544 10*3/MM3 (ref 140–450)
PMV BLD AUTO: 8.9 FL (ref 6–12)
POTASSIUM SERPL-SCNC: 3.8 MMOL/L (ref 3.5–4.7)
PROT SERPL-MCNC: 7.4 G/DL (ref 6.3–8)
RBC # BLD AUTO: 3.92 10*6/MM3 (ref 3.77–5.28)
RETICS # AUTO: 0.09 10*6/MM3 (ref 0.02–0.13)
RETICS/RBC NFR AUTO: 2.23 % (ref 0.7–1.9)
SODIUM SERPL-SCNC: 142 MMOL/L (ref 134–145)
TIBC SERPL-MCNC: 242 MCG/DL (ref 249–505)
TRANSFERRIN SERPL-MCNC: 173 MG/DL (ref 200–360)
WBC # BLD AUTO: 10.73 10*3/MM3 (ref 3.4–10.8)

## 2021-07-14 PROCEDURE — 99213 OFFICE O/P EST LOW 20 MIN: CPT | Performed by: INTERNAL MEDICINE

## 2021-07-14 PROCEDURE — 85046 RETICYTE/HGB CONCENTRATE: CPT

## 2021-07-14 PROCEDURE — 82728 ASSAY OF FERRITIN: CPT

## 2021-07-14 PROCEDURE — 84466 ASSAY OF TRANSFERRIN: CPT

## 2021-07-14 PROCEDURE — 85025 COMPLETE CBC W/AUTO DIFF WBC: CPT

## 2021-07-14 PROCEDURE — 80053 COMPREHEN METABOLIC PANEL: CPT

## 2021-07-14 PROCEDURE — 36415 COLL VENOUS BLD VENIPUNCTURE: CPT

## 2021-07-14 PROCEDURE — 83540 ASSAY OF IRON: CPT

## 2021-07-14 NOTE — PROGRESS NOTES
Subjective   DVT LE DUE TO IMMOBILITY PARALYZED LLE AND MINIMAL MOBILITY R LE DUE TO SPINA BIFIDA          History of Present Illness    DURING THE VISIT WITH THE PATIENT TODAY , PATIENT HAD FACE MASK, I HAD PROPER PROTECTIVE EQUIPMENT, AND I DID HAND HYGIENE WITH SOAP AND WATER BEFORE AND AFTER THE VISIT.  This patient returns today to the office after she has undergone 3 weeks ago, a hysterectomy by Gynecology in order to remove the uterus that was producing very significant genitourinary blood loss and profound iron deficiency anemia. She tolerated the procedure extremely well. She went home. She is healing fine. She returns today in her mobilizer without any new issues. She has not had any consequences from the surgery, requiring just taking Tylenol for pain. There is no vaginal discharge or bleeding. Proper urinary continence. Good bowel activity. Normal appetite. No nausea, vomiting or fever. No swelling in her lower extremities.     She remains on her Eliquis anticoagulant with no clinical bleeding and no new thrombosis.        HEMATOLOGIC HISTORY:  delightful 50-year-old female who has history of spina bifida. She was born and she has had surgeries for this having paralysis of her left lower extremity and partial movement of her right lower extremity. She gets around on crutches. In any event she has noticed significant degree of swelling in the right lower extremity for the last week or so and she developed some pain and she became concerned about this. She was brought to the emergency room by her family and a Doppler documented a deep vein thrombosis. The patient has been receiving anticoagulants since the admission. She has not had any chest pain, cough, sputum production, shortness of breath, pleuritic pain, hemoptysis. Her appetite has remained good, her weight is stable, her bowel function and urination have remained normal. She has not had any vaginal bleeding. She has been working from home during  the pandemia and she does not have a lot of mobility since then. Again the patient gets around with crutches that push her upper body to get around and she uses the minimal mobility of her right leg to propel herself around the situation.      She has no previous history of thrombophilia. She is not taking any hormone replacement therapy or using any anabolic steroids.      She has not had any recent trauma, no recent traveling anywhere.     The patient was further reviewed in the office on 01/17/2021.  She has noticed some fatigue and she was having significant GYN bleeding in the form of metrorrhagia.  This required a dose adjustment of her Eliquis to 2.5 mg twice a day.  She was advised to come and see us and we have documented today very significant anemia and her hemoglobin was 7.5.  The patient states that the bleeding in the  tract was abundant and continues.  She denies any melena, metrorrhagia or hematuria.  She has fatigue.  She is tired.  She is able to function.  Her work is now done from home.  Her appetite and weight are stable.  No cardiovascular issues.  The spina bifida and all implications of this remain unchanged, requiring crutches to mobilize herself from the house to get to the car to our building.        Past Medical History:   Diagnosis Date   • Bowel and bladder incontinence    • Constipation    • DVT (deep venous thrombosis) (CMS/HCC) 2012 2020    BOTH LEGS   • Fibroids     UTERINE   • Heavy periods    • History of anemia    • Muscle weakness     IN LEGS WEARS LEG BRACES   • Paralysis of left lower extremity (CMS/HCC)     with minimal mobility to RLE   • Spina bifida (CMS/HCC)      Past Surgical History:   Procedure Laterality Date   • LEG SURGERY      MULTIPLE LEG SURGERIES FOR SPINA BIFIDA   • SPINE SURGERY  1971    childhood   • TOTAL ABDOMINAL HYSTERECTOMY N/A 6/23/2021    Procedure: TOTAL ABDOMINAL HYSTERECTOMY, BILATERAL SALPINGECTOMY, RIGHT OOPHRECTOMY, LYSIS OF ADHESIONS ;   "Surgeon: Emani Chapa MD;  Location: [facility] OR [facility] [place];  Service: Obstetrics/Gynecology;  Laterality: N/A;   • UTERINE FIBROID SURGERY  2012   •  SHUNT INSERTION  1971 1973 1976     Family History   Problem Relation Age of Onset   • Malig Hyperthermia Neg Hx       Social History     Socioeconomic History   • Marital status: Single     Spouse name: Not on file   • Number of children: Not on file   • Years of education: Not on file   • Highest education level: Not on file   Tobacco Use   • Smoking status: Never Smoker   • Smokeless tobacco: Never Used   Substance and Sexual Activity   • Alcohol use: Not Currently     Comment: occasionally   • Drug use: Never   • Sexual activity: Defer     Current Outpatient Medications on File Prior to Visit   Medication Sig Dispense Refill   • apixaban (ELIQUIS) 2.5 MG tablet tablet Take 2.5 mg by mouth 2 (Two) Times a Day.     • Cholecalciferol (vitamin D3) 125 MCG (5000 UT) tablet tablet Take 1 tablet by mouth Daily. 30 tablet 4   • Prenatal MV-Min-Fe Fum-FA-DHA (PRENATAL 1 PO) Take 1 tablet by mouth 2 (Two) Times a Day.     • sennosides-docusate (senna-docusate sodium) 8.6-50 MG per tablet Take 1 tablet by mouth Every Morning.       No current facility-administered medications on file prior to visit.       ALLERGIES:    Allergies   Allergen Reactions   • Hydrolyzed Silk Hives, Itching, Palpitations and Shortness Of Breath       Objective      Vitals:    07/14/21 1439   BP: 133/86   Pulse: 99   Resp: 16   Temp: 98.4 °F (36.9 °C)   TempSrc: Oral   SpO2: 96%   Weight: Comment: unable   Height: 147.3 cm (57.99\")   PainSc: 0-No pain  Comment: discomfort in abominal area     Current Status 5/10/2021   ECOG score 1     EXAM : I HAVE PERSONALLY REVIEWED THE HISTORY OF THE PRESENT ILLNESS, PAST MEDICAL HISTORY, FAMILY HISTORY, SOCIAL HISTORY, ALLERGIES, MEDICATIONS STATED ABOVE IN THE OFFICE NOTE FROM TODAY.        GENERAL:  Well-developed, well-nourished  Patient  in no acute distress. " IN ELECTRIC TRANPSPORTER  SKIN:  Warm, dry ,NO rashes,NO purpura ,NO petechiae.  HEENT:  Pupils were equal and reactive to light and accomodation, conjunctivae noninjected, no pterygium, normal extraocular movements, normal visual acuity.   NECK:  Supple with good range of motion; no thyromegaly or masses, no JVD or bruits, no cervical adenopathies.No carotid artery pain, no carotid abnormal pulsation , NO arterial dance.  LYMPHATICS:  No cervical, NO supraclavicular, NO axillary,NO epitrochlear , NO inguinal adenopathy.  CARDIAC   normal rate and regular rhythm, without murmur,NO rubs NO S3 NO S4 right or left .  VASCULAR VENOUS: no cyanosis, collateral circulation, varicosities, edema, palpable cords, pain, erythema.  ABDOMEN:  Soft, nontender with no hepatomegaly, no splenomegaly,no masses, no ascites, no collateral circulation,no distention,no Jackson sign, no abdominal pain  EXTREMITIES  AND SPINE:  DEFORMITIES IN LE PARALYSIS DUE TO SPINA BIFIDA  NEUROLOGICAL:  Patient was awake, alert, oriented to time, person and place.USING CRUTCHES AND SCOOTER TO MOBILIZE        RECENT LABS:  Hematology WBC   Date Value Ref Range Status   07/14/2021 10.73 3.40 - 10.80 10*3/mm3 Final     RBC   Date Value Ref Range Status   07/14/2021 3.92 3.77 - 5.28 10*6/mm3 Final     Hemoglobin   Date Value Ref Range Status   07/14/2021 11.7 (L) 12.0 - 15.9 g/dL Final     Hematocrit   Date Value Ref Range Status   07/14/2021 37.0 34.0 - 46.6 % Final     Platelets   Date Value Ref Range Status   07/14/2021 544 (H) 140 - 450 10*3/mm3 Final                    Assessment/Plan    This patient has had thrombophilia in the past when I saw her in consultation at [facility] in August of 2020 when she presented with a deep vein thrombosis. This was very extensive. She was placed on anticoagulants and she was discharged. Thrombophilia labs were documented including the fact that she is factor V Leiden positive, heterozygous,  prothrombin gene mutation negative and the rest of the thrombophilia labs were negative. The patient has remained anticoagulated since then. She lacked insurance all through 2020 but now she has insurance and she was willing to come and see us. Today we have documented that the patient has tremendous degree of metrorrhagia recently and this triggered very significant anemia with a hemoglobin of 7.5 . Her MCV is low. She denies any GI bleeding. Her diet is appropriate. Her weight is stable. She has no abdominal pain and no pelvic pain. Immobility due to her spina bifida remains ongoing and she mobilizes herself around the house with crutches, here with a small cart.     I gave her the report of her heterozygous factor V Leiden mutation and I explained to her that she needs to discuss with family members on both sides of her family, father and mother who are alive, the need for them to be tested for this. That is very crucial and I asked her to explain to them why it is important.      I advised her that this is a genetic condition that will remain with her for the rest of her life and given her immobility and spina bifida situation is not going to change, I think that she needs to remain on anticoagulants for the rest of her life as well.     I directed also my advice for treatment of her anemia. She is taking a prenatal vitamin and I pointed out to her that she probably needs to take 2 of them a day to see if we can build up her hemoglobin better now that her dose of Eliquis has been adjusted down and see if she bleeds less. I would like for her to return to see us and a nurse and a blood count and a reticulated hemoglobin in 1 month and I would like to review her back in 2 months. If anemia is not corrected with simple methodology, I think she will require IV iron therapy.      I also suggested dietetic changes including eating some red meat and also leafy things like kale, maria luisa greens, spinach, almonds and oats  which are rich in iron as well. She does not like liver or things of this nature.      I asked her also if she continues bleeding in the genitourinary tract to give us a call and she will require to be seen by gynecologist. In the past she has had a fibroid surgery and maybe she will require at some point abdominal and pelvis CT scans.     The other issue that I discussed with her is the need for her eventually to be vaccinated for Covid and I asked her to proceed with this as well whenever this becomes available. I find no formal contraindication for her for this.     I did a breast examination today that is normal and this was performed because on the CT scan of the chest while in the hospital documented breast nodularity. Now that the patient has insurance her primary physician will proceed with mammograms. I agree with this completely.    The patient was further reviewed on 07/14/2021 after I have advised her before to proceed with a hysterectomy. We modified her dose of anticoagulant for this to happen and then she has recovered. Actually today her hemoglobin after hysterectomy is 11.7, and is very good. Did not change too much, before the hysterectomy was 12. The patient remains on her anticoagulant, Eliquis. No clinical bleeding and no new vein thrombosis in her lower extremities.     Her ferritin is pending. Her reticulated hemoglobin is 30. The patient remains on oral ferrous sulfate, prenatal vitamin 3 times a week and I advised her to remain on this medicine for the time being.     From the point of view of her anticoagulation, given her previous history of factor V Leiden heterozygosity and previous extensive DVT, I advised her to remain on the Eliquis for the time being. Because of her paralysis due to spina bifida, she needs to transfer herself by crutches from her electrical transporter device into the car and I advised her again to be extremely careful in these transfers.     I advised her to return  to see me back in 3 months to monitor her CBC, hemoglobin and reticulated hemoglobin, ferritin, iron profile.     The numbers obtained today will be called to her once that they become available.     Otherwise, no other intervention from my point of view.    ·               7/14/2021      CC:

## 2021-07-21 ENCOUNTER — OFFICE VISIT (OUTPATIENT)
Dept: OBSTETRICS AND GYNECOLOGY | Facility: CLINIC | Age: 51
End: 2021-07-21

## 2021-07-21 VITALS
HEIGHT: 58 IN | SYSTOLIC BLOOD PRESSURE: 125 MMHG | DIASTOLIC BLOOD PRESSURE: 78 MMHG | WEIGHT: 140 LBS | BODY MASS INDEX: 29.39 KG/M2

## 2021-07-21 DIAGNOSIS — T81.31XD SUPERFICIAL DISRUPTION OR DEHISCENCE OF OPERATION WOUND, SUBSEQUENT ENCOUNTER: ICD-10-CM

## 2021-07-21 DIAGNOSIS — Z90.710 S/P TOTAL ABDOMINAL HYSTERECTOMY: ICD-10-CM

## 2021-07-21 DIAGNOSIS — Z09 POSTOPERATIVE FOLLOW-UP: Primary | ICD-10-CM

## 2021-07-21 PROCEDURE — 99024 POSTOP FOLLOW-UP VISIT: CPT | Performed by: STUDENT IN AN ORGANIZED HEALTH CARE EDUCATION/TRAINING PROGRAM

## 2021-07-22 NOTE — PROGRESS NOTES
Chief Complaint   Patient presents with   • Post-op Follow-up     had hysterectomy      Angélica Lopez is a 51 y.o. female who presents to the clinic 2 weeks status post total abdominal hysterectomy, bilateral salpingectomy, right oophorectomy, and lysis of bowel adhesions for uterine fibroids, pelvic pressure, and moderate adhesive disease of the bowel. She had a postoperative course complicated by postoperative ileus that was managed with an NG tube and bowel rest. She reports doing pretty well today. She has had yellow-bloody drainage from the middle of her incision but denies any purulent drainage. She reports her pain is well controlled on pain medications and she is not using narcotics. She reports that she is having bowel movements. She is voiding without difficulty. She is now back to her functional status. She is tolerated a regular diet. Denies fever, chills, nausea, vomiting, redness of her incision.       Past Medical History:   Diagnosis Date   • Bowel and bladder incontinence    • Constipation    • DVT (deep venous thrombosis) (CMS/HCC) 2012 2020    BOTH LEGS   • Fibroids     UTERINE   • Heavy periods    • History of anemia    • Muscle weakness     IN LEGS WEARS LEG BRACES   • Paralysis of left lower extremity (CMS/HCC)     with minimal mobility to RLE   • Spina bifida (CMS/HCC)      Past Surgical History:   Procedure Laterality Date   • LEG SURGERY      MULTIPLE LEG SURGERIES FOR SPINA BIFIDA   • SPINE SURGERY  1971    childhood   • TOTAL ABDOMINAL HYSTERECTOMY N/A 6/23/2021    Procedure: TOTAL ABDOMINAL HYSTERECTOMY, BILATERAL SALPINGECTOMY, RIGHT OOPHRECTOMY, LYSIS OF ADHESIONS ;  Surgeon: Emani Chapa MD;  [place] [facility] [place] [facility] [place]  Service: Obstetrics/Gynecology;  Laterality: N/A;   • UTERINE FIBROID SURGERY  2012   •  SHUNT INSERTION  1971 1973 1976     Social History     Tobacco Use   • Smoking status: Never Smoker   • Smokeless tobacco: Never Used   Substance Use Topics   • Alcohol  "use: Not Currently     Comment: occasionally   • Drug use: Never     Family History   Problem Relation Age of Onset   • Malig Hyperthermia Neg Hx          Review of Systems   Constitutional: Negative for chills and fever.   Gastrointestinal: Positive for abdominal pain. Negative for nausea and vomiting.   Genitourinary: Negative for dysuria, vaginal bleeding and vaginal discharge.       OBJECTIVE:   Vitals:    07/13/21 0929   BP: 130/90   Weight: 63.5 kg (140 lb)   Height: 147.3 cm (57.99\")        Physical Exam  Vitals reviewed. Exam conducted with a chaperone present.   Constitutional:       General: She is not in acute distress.  HENT:      Head: Normocephalic and atraumatic.      Right Ear: External ear normal.      Left Ear: External ear normal.   Eyes:      Extraocular Movements: Extraocular movements intact.      Pupils: Pupils are equal, round, and reactive to light.   Pulmonary:      Effort: Pulmonary effort is normal. No respiratory distress.   Abdominal:      General: There is no distension.      Palpations: Abdomen is soft. There is no mass.      Tenderness: There is no abdominal tenderness. There is no guarding or rebound.      Comments: Vertical midline incision with superficial wound separation approximately 3 cm infraumbilical extending approximately 3 cm and approximately 2 cm deep. Probed with Q-tip and fascia palpated intact. Serous discharge present.No erythema present. Induration approximately 2 cm surrounding incision. Iodoform packing placed in the wound.    Musculoskeletal:         General: No deformity. Normal range of motion.      Cervical back: Normal range of motion and neck supple.   Skin:     General: Skin is warm and dry.   Neurological:      Mental Status: She is alert and oriented to person, place, and time. Mental status is at baseline.   Psychiatric:         Mood and Affect: Mood normal.         Behavior: Behavior normal.         ASSESSMENT:  1. S/p YAHIR, BS, RO, JEFF  2. Superficial " wound separation of vertical midline incision     PLAN:   Reviewed pathology results with the patient that demonstrated all benign pathology with largest fibroid measuring 5.5 cm.   Superficial wound separation without evidence of infection at this time. Recommend twice a day packing in the wound with iodoform and taught the patient how to pack incision. Will allow to heal by secondary intention. Reviewed infection precautions.   Pain control adequate. Will have patient return in 1 week to evaluate the incision. Advised the patient to continue to restrict lifting and nothing in the vagina. She has continued on her Eliquis. All questions and concerns answered.    Emani Chapa MD

## 2021-07-30 RX ORDER — RESVER/WINE/BFL/GRPSD/PC/C/POM 200MG-60MG
CAPSULE ORAL
Qty: 30 TABLET | Refills: 4 | Status: SHIPPED | OUTPATIENT
Start: 2021-07-30 | End: 2021-08-17 | Stop reason: SDUPTHER

## 2021-08-04 ENCOUNTER — OFFICE VISIT (OUTPATIENT)
Dept: OBSTETRICS AND GYNECOLOGY | Facility: CLINIC | Age: 51
End: 2021-08-04

## 2021-08-04 VITALS
WEIGHT: 140 LBS | DIASTOLIC BLOOD PRESSURE: 86 MMHG | HEIGHT: 58 IN | SYSTOLIC BLOOD PRESSURE: 140 MMHG | BODY MASS INDEX: 29.39 KG/M2

## 2021-08-04 DIAGNOSIS — Z90.710 S/P ABDOMINAL HYSTERECTOMY: ICD-10-CM

## 2021-08-04 DIAGNOSIS — Z09 POSTOPERATIVE FOLLOW-UP: Primary | ICD-10-CM

## 2021-08-04 DIAGNOSIS — R32 URINARY INCONTINENCE IN FEMALE: ICD-10-CM

## 2021-08-04 PROCEDURE — 99024 POSTOP FOLLOW-UP VISIT: CPT | Performed by: STUDENT IN AN ORGANIZED HEALTH CARE EDUCATION/TRAINING PROGRAM

## 2021-08-04 NOTE — PROGRESS NOTES
Chief Complaint   Patient presents with   • Post-op Follow-up     after hysterectomy      Angélica Lopez is a 51 y.o. female who presents to the clinic 4 weeks status post total abdominal hysterectomy, bilateral salpingectomy, right oophorectomy, and lysis of bowel adhesions for uterine fibroids, pelvic pressure, and moderate adhesive disease of the bowel performed on 6/23/21. She had a postoperative course complicated by postoperative ileus that was managed with an NG tube and bowel rest and superficial wound separation of the vertical midline incision. She reports doing well today and reports less drainage from the incision and states that she can no longer pack the incision because it has slowly closed up since visit last week. She reports her pain is well controlled on tylenol and ibuprofen. She is having normal bowel movements and is voiding without difficulty. She reports that she is back to normal functional status. She had resumed her anticoagulation at time of discharge with Eliquis. She denies fever, chills, nausea, vomiting, redness of her incision.       Past Medical History:   Diagnosis Date   • Bowel and bladder incontinence    • Constipation    • DVT (deep venous thrombosis) (CMS/HCC) 2012 2020    BOTH LEGS   • Fibroids     UTERINE   • Heavy periods    • History of anemia    • Muscle weakness     IN LEGS WEARS LEG BRACES   • Paralysis of left lower extremity (CMS/HCC)     with minimal mobility to RLE   • Spina bifida (CMS/HCC)      Past Surgical History:   Procedure Laterality Date   • LEG SURGERY      MULTIPLE LEG SURGERIES FOR SPINA BIFIDA   • SPINE SURGERY  1971    childhood   • TOTAL ABDOMINAL HYSTERECTOMY N/A 6/23/2021    Procedure: TOTAL ABDOMINAL HYSTERECTOMY, BILATERAL SALPINGECTOMY, RIGHT OOPHRECTOMY, LYSIS OF ADHESIONS ;  Surgeon: Emani Chapa MD;  Location: [facility] [place];  Service: Obstetrics/Gynecology;  Laterality: N/A;   • UTERINE FIBROID SURGERY  2012   •  SHUNT INSERTION  1971  " 1973 1976     Social History     Tobacco Use   • Smoking status: Never Smoker   • Smokeless tobacco: Never Used   Substance Use Topics   • Alcohol use: Not Currently     Comment: occasionally   • Drug use: Never     Family History   Problem Relation Age of Onset   • Malig Hyperthermia Neg Hx          Review of Systems   Constitutional: Negative for chills and fever.   Respiratory: Negative for cough and shortness of breath.    Cardiovascular: Negative for chest pain.   Gastrointestinal: Positive for abdominal pain. Negative for abdominal distention, nausea and vomiting.   Genitourinary: Negative for difficulty urinating and vaginal bleeding.       OBJECTIVE:   Vitals:    07/21/21 1358   BP: 125/78   Weight: 63.5 kg (140 lb)   Height: 147.3 cm (57.99\")        Physical Exam  Vitals reviewed.   Constitutional:       General: She is not in acute distress.  HENT:      Head: Normocephalic and atraumatic.      Right Ear: External ear normal.      Left Ear: External ear normal.   Eyes:      Extraocular Movements: Extraocular movements intact.      Pupils: Pupils are equal, round, and reactive to light.   Pulmonary:      Effort: Pulmonary effort is normal. No respiratory distress.   Abdominal:      General: There is no distension.      Palpations: Abdomen is soft.      Tenderness: There is no abdominal tenderness. There is no guarding or rebound.      Comments: Vertical midline incision with superficial wound separation approximately 3 cm infraumbilical extending approximately 2 cm and now less than 0.5 cm deep. Small amount of serous discharge present.No erythema present. Induration approximately 1 cm surrounding incision. Incision closed with steri strips.    Musculoskeletal:         General: No swelling or tenderness.      Cervical back: Normal range of motion and neck supple.   Skin:     General: Skin is warm and dry.   Neurological:      Mental Status: She is alert and oriented to person, place, and time. Mental status " is at baseline.   Psychiatric:         Mood and Affect: Mood normal.         Behavior: Behavior normal.         ASSESSMENT:  1. S/p YAHIR, BS, RO, JEFF  2. Superficial wound separation of vertical midline incision, improving     PLAN:   Superficial wound separation is improving through secondary intention and wound closed today with steri strips. Patient advised to keep steri strips in place for 7 days and then remove. The patient is to monitor for signs of infection including erythema, purulent drainage or worsening pain.   Pain control adequate.  Continue lifting precautions and pelvic rest.   RTO for 2 weeks for follow up visit.     Emani Chapa MD

## 2021-08-09 ENCOUNTER — TELEPHONE (OUTPATIENT)
Dept: FAMILY MEDICINE CLINIC | Facility: CLINIC | Age: 51
End: 2021-08-09

## 2021-08-09 NOTE — TELEPHONE ENCOUNTER
Caller: Lucien Lopez    Relationship: Self    Best call back number: [phone]   What is the best time to reach you: ASAP   Who are you requesting to speak with (clinical staff, provider,  specific staff member): CLINICAL     Do you know the name of the person who called: LUCIEN     What was the call regarding: PHARMACY DID NOT RECEIVE THE RX FOR VIT D, CAN YOU PLEASE SEND AGAIN. SHE ALSO NEEDS A CALL BACK A BOUT A FORM SHE NEEDS FILLED OUT ABOUT HER SIGHT. SOMETHING FOR HER WORK. PLEASE LEAVE A MESSAGE. SHE DOES NOT ANSWER PHONE IF IT IS NOT FROM A NUMBER SHE KNOWS.     Do you require a callback: YES

## 2021-08-09 NOTE — TELEPHONE ENCOUNTER
Spoke with pharmacy they will have rx ready  Pt informed  Informed needs to go to eye dr for vision form

## 2021-08-18 ENCOUNTER — TELEPHONE (OUTPATIENT)
Dept: FAMILY MEDICINE CLINIC | Facility: CLINIC | Age: 51
End: 2021-08-18

## 2021-08-18 RX ORDER — RESVER/WINE/BFL/GRPSD/PC/C/POM 200MG-60MG
1 CAPSULE ORAL DAILY
Qty: 30 TABLET | Refills: 0 | Status: SHIPPED | OUTPATIENT
Start: 2021-08-18 | End: 2021-08-18 | Stop reason: SDUPTHER

## 2021-08-18 RX ORDER — RESVER/WINE/BFL/GRPSD/PC/C/POM 200MG-60MG
1 CAPSULE ORAL DAILY
Qty: 30 TABLET | Refills: 0 | Status: SHIPPED | OUTPATIENT
Start: 2021-08-18

## 2021-08-18 NOTE — PROGRESS NOTES
Chief Complaint   Patient presents with   • Post-op Follow-up     after hysterectomy      Angélica Lopez is a 51 y.o. female who presents to the clinic 6 weeks status post total abdominal hysterectomy, bilateral salpingectomy, right oophorectomy, and lysis of bowel adhesions for uterine fibroids, pelvic pressure and moderate adhesive disease of bowel performed on 6/23/21. Her postoperative course has been complicated by immediate postoperative ileus requiring NG tube and bowel rest and superficial wound separation of vertical midline incision requiring packing for short period of time.     Since her last appointment, the patient reports that she has been doing well. She reports having intermittent sharp pains on the right side of her incision. She is not having to take pain medication. She reports that she is tolerating a regular diet and having normal bowel movements. She reports that she is having increased episodes of urinary incontinence overnight. She has resumed her Eliquis. Her incision has fully closed and is no longer having drainage. Denies vaginal bleeding.       Past Medical History:   Diagnosis Date   • Bowel and bladder incontinence    • Constipation    • DVT (deep venous thrombosis) (CMS/HCC) 2012 2020    BOTH LEGS   • Fibroids     UTERINE   • Heavy periods    • History of anemia    • Muscle weakness     IN LEGS WEARS LEG BRACES   • Paralysis of left lower extremity (CMS/HCC)     with minimal mobility to RLE   • Spina bifida (CMS/HCC)      Past Surgical History:   Procedure Laterality Date   • LEG SURGERY      MULTIPLE LEG SURGERIES FOR SPINA BIFIDA   • SPINE SURGERY  1971    childhood   • TOTAL ABDOMINAL HYSTERECTOMY N/A 6/23/2021    Procedure: TOTAL ABDOMINAL HYSTERECTOMY, BILATERAL SALPINGECTOMY, RIGHT OOPHRECTOMY, LYSIS OF ADHESIONS ;  Surgeon: Emani Chapa MD;  Location: [facility] OR [facility];  Service: Obstetrics/Gynecology;  Laterality: N/A;   • UTERINE FIBROID SURGERY  2012   •  SHUNT  "INSERTION  1971 1973 1976     Social History     Tobacco Use   • Smoking status: Never Smoker   • Smokeless tobacco: Never Used   Substance Use Topics   • Alcohol use: Not Currently     Comment: occasionally   • Drug use: Never     Family History   Problem Relation Age of Onset   • Malig Hyperthermia Neg Hx          Review of Systems   Constitutional: Negative for chills and fever.   Respiratory: Negative for shortness of breath.    Cardiovascular: Negative for chest pain.   Gastrointestinal: Negative for abdominal pain.   Genitourinary: Negative for difficulty urinating, vaginal bleeding and vaginal discharge.       OBJECTIVE:   Vitals:    08/04/21 1419   BP: 140/86   Weight: 63.5 kg (140 lb)   Height: 147.3 cm (57.99\")        Physical Exam  Vitals reviewed. Exam conducted with a chaperone present.   Constitutional:       General: She is not in acute distress.  HENT:      Head: Normocephalic and atraumatic.      Right Ear: External ear normal.      Left Ear: External ear normal.   Eyes:      Extraocular Movements: Extraocular movements intact.      Pupils: Pupils are equal, round, and reactive to light.   Pulmonary:      Effort: Pulmonary effort is normal. No respiratory distress.   Abdominal:      Comments: Vertical midline incision that is healing well with induration of 0.5 cm surrounding the incision around the umbilicus. No erythema present.    Genitourinary:     General: Normal vulva.      Exam position: Lithotomy position.      Labia:         Right: No rash, tenderness, lesion or injury.         Left: No rash, tenderness, lesion or injury.       Urethra: No prolapse or urethral swelling.      Vagina: No vaginal discharge, erythema, tenderness, bleeding or lesions.      Uterus: Absent.       Adnexa:         Right: No mass, tenderness or fullness.          Left: No mass, tenderness or fullness.        Comments: Well healing, intact vaginal cuff without masses or tenderness.   Musculoskeletal:         General: " No swelling or tenderness.      Cervical back: Normal range of motion and neck supple.   Lymphadenopathy:      Lower Body: No right inguinal adenopathy. No left inguinal adenopathy.   Skin:     General: Skin is warm and dry.   Neurological:      General: No focal deficit present.      Mental Status: She is alert and oriented to person, place, and time.   Psychiatric:         Mood and Affect: Mood normal.         Behavior: Behavior normal.         ASSESSMENT:  1. S/p YAHIR, BS, RO, JEFF  2. Urinary incontinence    Plan:  Advised that the patient may now resume normal activities but recommend still continuing with light lifting for 2 more weeks.   I discussed that sometimes after hysterectomy, urinary incontinence may be diagnosed and/or worsen. I recommend waiting 6 months monitoring symptoms after surgery and if persistent at that time, will refer the patient to Uro/Gyn for further management.   Patient indicated understanding and all questions answered.   Follow up in 1 year for annual exam or earlier as needed.     Emani Chapa MD

## 2021-08-18 NOTE — TELEPHONE ENCOUNTER
Caller: Angélica Lopez    Relationship: Self    Best call back number: [phone]    What was the call regarding:     PATIENT IS STILL HAVING TROUBLE GETTING HER VITAMIN D FILLED AT THE PHARMACY. SHE IS REQUESTING THAT SHE BE GIVEN A PAPER SCRIPT THAT CAN BE DELIVERED HERSELF. SHE WANTS TO  TODAY. PLEASE CALL WHEN READY FOR .      Do you require a callback: YES

## 2021-09-28 ENCOUNTER — TELEPHONE (OUTPATIENT)
Dept: PHARMACY | Facility: HOSPITAL | Age: 51
End: 2021-09-28

## 2021-10-13 ENCOUNTER — LAB (OUTPATIENT)
Dept: LAB | Facility: HOSPITAL | Age: 51
End: 2021-10-13

## 2021-10-13 ENCOUNTER — OFFICE VISIT (OUTPATIENT)
Dept: ONCOLOGY | Facility: CLINIC | Age: 51
End: 2021-10-13

## 2021-10-13 VITALS
HEIGHT: 58 IN | HEART RATE: 88 BPM | BODY MASS INDEX: 28.34 KG/M2 | OXYGEN SATURATION: 97 % | DIASTOLIC BLOOD PRESSURE: 84 MMHG | WEIGHT: 135 LBS | TEMPERATURE: 97.9 F | SYSTOLIC BLOOD PRESSURE: 131 MMHG | RESPIRATION RATE: 18 BRPM

## 2021-10-13 DIAGNOSIS — D50.0 IRON DEFICIENCY ANEMIA DUE TO CHRONIC BLOOD LOSS: ICD-10-CM

## 2021-10-13 DIAGNOSIS — I82.4Y3 DVT, LOWER EXTREMITY, PROXIMAL, ACUTE, BILATERAL (HCC): ICD-10-CM

## 2021-10-13 DIAGNOSIS — Z79.01 CHRONIC ANTICOAGULATION: ICD-10-CM

## 2021-10-13 DIAGNOSIS — D68.51 FACTOR 5 LEIDEN MUTATION, HETEROZYGOUS (HCC): Primary | ICD-10-CM

## 2021-10-13 DIAGNOSIS — D68.51 FACTOR 5 LEIDEN MUTATION, HETEROZYGOUS (HCC): ICD-10-CM

## 2021-10-13 LAB
BASOPHILS # BLD AUTO: 0.04 10*3/MM3 (ref 0–0.2)
BASOPHILS NFR BLD AUTO: 0.5 % (ref 0–1.5)
DEPRECATED RDW RBC AUTO: 46.3 FL (ref 37–54)
EOSINOPHIL # BLD AUTO: 0.31 10*3/MM3 (ref 0–0.4)
EOSINOPHIL NFR BLD AUTO: 3.7 % (ref 0.3–6.2)
ERYTHROCYTE [DISTWIDTH] IN BLOOD BY AUTOMATED COUNT: 13.4 % (ref 12.3–15.4)
FERRITIN SERPL-MCNC: 65.6 NG/ML (ref 11–207)
HCT VFR BLD AUTO: 46.2 % (ref 34–46.6)
HGB BLD-MCNC: 14.8 G/DL (ref 12–15.9)
HGB RETIC QN AUTO: 35 PG (ref 29.8–36.1)
IMM GRANULOCYTES # BLD AUTO: 0.04 10*3/MM3 (ref 0–0.05)
IMM GRANULOCYTES NFR BLD AUTO: 0.5 % (ref 0–0.5)
IMM RETICS NFR: 18.1 % (ref 3–15.8)
IRON 24H UR-MRATE: 97 MCG/DL (ref 37–145)
IRON SATN MFR SERPL: 23 % (ref 14–48)
LYMPHOCYTES # BLD AUTO: 1.14 10*3/MM3 (ref 0.7–3.1)
LYMPHOCYTES NFR BLD AUTO: 13.5 % (ref 19.6–45.3)
MCH RBC QN AUTO: 30.2 PG (ref 26.6–33)
MCHC RBC AUTO-ENTMCNC: 32 G/DL (ref 31.5–35.7)
MCV RBC AUTO: 94.3 FL (ref 79–97)
MONOCYTES # BLD AUTO: 0.55 10*3/MM3 (ref 0.1–0.9)
MONOCYTES NFR BLD AUTO: 6.5 % (ref 5–12)
NEUTROPHILS NFR BLD AUTO: 6.39 10*3/MM3 (ref 1.7–7)
NEUTROPHILS NFR BLD AUTO: 75.3 % (ref 42.7–76)
NRBC BLD AUTO-RTO: 0 /100 WBC (ref 0–0.2)
PLATELET # BLD AUTO: 293 10*3/MM3 (ref 140–450)
PMV BLD AUTO: 9.3 FL (ref 6–12)
RBC # BLD AUTO: 4.9 10*6/MM3 (ref 3.77–5.28)
RETICS # AUTO: 0.1 10*6/MM3 (ref 0.02–0.13)
RETICS/RBC NFR AUTO: 2 % (ref 0.7–1.9)
TIBC SERPL-MCNC: 413 MCG/DL (ref 249–505)
TRANSFERRIN SERPL-MCNC: 295 MG/DL (ref 200–360)
WBC # BLD AUTO: 8.47 10*3/MM3 (ref 3.4–10.8)

## 2021-10-13 PROCEDURE — 99213 OFFICE O/P EST LOW 20 MIN: CPT | Performed by: INTERNAL MEDICINE

## 2021-10-13 PROCEDURE — 36415 COLL VENOUS BLD VENIPUNCTURE: CPT

## 2021-10-13 PROCEDURE — 85046 RETICYTE/HGB CONCENTRATE: CPT

## 2021-10-13 PROCEDURE — 83540 ASSAY OF IRON: CPT

## 2021-10-13 PROCEDURE — 82728 ASSAY OF FERRITIN: CPT

## 2021-10-13 PROCEDURE — 85025 COMPLETE CBC W/AUTO DIFF WBC: CPT

## 2021-10-13 PROCEDURE — 84466 ASSAY OF TRANSFERRIN: CPT

## 2021-10-13 NOTE — PROGRESS NOTES
Subjective   DVT LE DUE TO IMMOBILITY PARALYZED LLE AND MINIMAL MOBILITY R LE DUE TO SPINA BIFIDA          History of Present Illness    DURING THE VISIT WITH THE PATIENT TODAY , PATIENT HAD FACE MASK, I HAD PROPER PROTECTIVE EQUIPMENT, AND I DID HAND HYGIENE WITH SOAP AND WATER BEFORE AND AFTER THE VISIT.    This patient returns today to the office for followup after she has had a partial hysterectomy that has controlled her vaginal bleeding altogether and obviously helping her to resolve her anemia associated with chronic genitourinary blood loss. During the transition through the process of the hysterectomy, I discussed issues with her gynecologist to be sure that her anticoagulation during the process was going to be appropriate to minimize another episode of thrombophilia associated with her factor V Leiden mutation heterozygosity. The patient’s appetite is good. Her bowel activity is normal. She continues working on routine basis and she continues doing all the efforts to take care of herself. Amazes me how she is able to control her life and take care of herself.     She remains on anticoagulant, liquids, with no difficulties and the dose that she receives is 2.5 mg b.i.d. She remains on prenatal vitamin.          HEMATOLOGIC HISTORY:  delightful 50-year-old female who has history of spina bifida. She was born and she has had surgeries for this having paralysis of her left lower extremity and partial movement of her right lower extremity. She gets around on crutches. In any event she has noticed significant degree of swelling in the right lower extremity for the last week or so and she developed some pain and she became concerned about this. She was brought to the emergency room by her family and a Doppler documented a deep vein thrombosis. The patient has been receiving anticoagulants since the admission. She has not had any chest pain, cough, sputum production, shortness of breath, pleuritic pain,  hemoptysis. Her appetite has remained good, her weight is stable, her bowel function and urination have remained normal. She has not had any vaginal bleeding. 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 Again the patient gets around with crutches that push her upper body to get around and she uses the minimal mobility of her right leg to propel herself around the situation.      She has no previous history of thrombophilia. She is not taking any hormone replacement therapy or using any anabolic steroids.      She has not had any recent trauma, no recent traveling anywhere.     The patient was further reviewed in the office on 01/17/2021.  She has noticed some fatigue and she was having significant GYN bleeding in the form of metrorrhagia.  This required a dose adjustment of her Eliquis to 2.5 mg twice a day.  She was advised to come and see us and we have documented today very significant anemia and her hemoglobin was 7.5.  The patient states that the bleeding in the  tract was abundant and continues.  She denies any melena, metrorrhagia or hematuria.  She has fatigue.  She is tired.  She is able to function.  Her work is now done from home.  Her appetite and weight are stable.  No cardiovascular issues.  The spina bifida and all implications of this remain unchanged, requiring crutches to mobilize herself from the house to get to the car to our building.        Past Medical History:   Diagnosis Date   • Bowel and bladder incontinence    • Constipation    • DVT (deep venous thrombosis) ([facility]) 2012 2020    BOTH LEGS   • Fibroids     UTERINE   • Heavy periods    • History of anemia    • Muscle weakness     IN LEGS WEARS LEG BRACES   • Paralysis of left lower extremity (HCC)     with minimal mobility to RLE   • Spina bifida ([facility])      Past Surgical History:   Procedure Laterality Date   • LEG SURGERY      MULTIPLE LEG SURGERIES FOR SPINA BIFIDA   • SPINE  "SURGERY  1971    childhood   • TOTAL ABDOMINAL HYSTERECTOMY N/A 6/23/2021    Procedure: TOTAL ABDOMINAL HYSTERECTOMY, BILATERAL SALPINGECTOMY, RIGHT OOPHRECTOMY, LYSIS OF ADHESIONS ;  Surgeon: Emani Chapa MD;  Location: [facility] [place] [facility] [place];  Service: Obstetrics/Gynecology;  Laterality: N/A;   • UTERINE FIBROID SURGERY  2012   •  SHUNT INSERTION  1971 1973 1976     Family History   Problem Relation Age of Onset   • Malig Hyperthermia Neg Hx       Social History     Socioeconomic History   • Marital status: Single   Tobacco Use   • Smoking status: Never Smoker   • Smokeless tobacco: Never Used   Substance and Sexual Activity   • Alcohol use: Not Currently     Comment: occasionally   • Drug use: Never   • Sexual activity: Defer     Current Outpatient Medications on File Prior to Visit   Medication Sig Dispense Refill   • apixaban (ELIQUIS) 2.5 MG tablet tablet Take 2.5 mg by mouth 2 (Two) Times a Day.     • Cholecalciferol (D-5000) 125 MCG (5000 UT) tablet Take 1 tablet by mouth Daily. 30 tablet 0   • Prenatal MV-Min-Fe Fum-FA-DHA (PRENATAL 1 PO) Take 1 tablet by mouth 2 (Two) Times a Day.     • sennosides-docusate (senna-docusate sodium) 8.6-50 MG per tablet Take 1 tablet by mouth Every Morning.       No current facility-administered medications on file prior to visit.       ALLERGIES:    Allergies   Allergen Reactions   • Hydrolyzed Silk Hives, Itching, Palpitations and Shortness Of Breath       Objective      Vitals:    10/13/21 1420   BP: 131/84   Pulse: 88   Resp: 18   Temp: 97.9 °F (36.6 °C)   TempSrc: Oral   SpO2: 97%   Weight: 61.2 kg (135 lb)  Comment: weight given by pt, pt unable to stand for weigh in   Height: 147.3 cm (58\")   PainSc: 0-No pain     Current Status 10/13/2021   ECOG score 3     EXAM : I HAVE PERSONALLY REVIEWED THE HISTORY OF THE PRESENT ILLNESS, PAST MEDICAL HISTORY, FAMILY HISTORY, SOCIAL HISTORY, ALLERGIES, MEDICATIONS STATED ABOVE IN THE  NOTE FROM TODAY.        GENERAL:  " Well-developed, well-nourished  Patient  in no acute distress.   SKIN:  Warm, dry ,NO rashes,NO purpura ,NO petechiae.  HEENT:  Pupils were equal and reactive to light and accomodation, conjunctivae noninjected, no pterygium, normal extraocular movements, normal visual acuity.   NECK:  Supple with good range of motion; no thyromegaly , no other masses, no JVD or bruits, no cervical adenopathies.No carotid artery pain, no carotid abnormal pulsation , NO arterial dance.  LYMPHATICS:  No cervical, NO supraclavicular, NO axillary,NO epitrochlear , NO inguinal adenopathy.  CARDIAC   normal rate and regular rhythm, without murmur,NO rubs NO S3 NO S4 right or left .  LUNGS: normal breath sounds bilateral, no wheezing, rhonchi, crackles or rubs.  VASCULAR VENOUS: no cyanosis, collateral circulation, varicosities, edema, palpable cords, pain, erythema.  ABDOMEN:  Soft, nontender with no hepatomegaly, no splenomegaly,no masses, no ascites, no collateral circulation,no distention,no [place] sign.  EXTREMITIES  AND SPINE:  No clubbing, cyanosis or edema, le deformities   NEUROLOGICAL:  Patient was awake, alert, oriented to time, person and place.paralysis of le, using a scooter and crutches        RECENT LABS:  Hematology WBC   Date Value Ref Range Status   10/13/2021 8.47 3.40 - 10.80 10*3/mm3 Final     RBC   Date Value Ref Range Status   10/13/2021 4.90 3.77 - 5.28 10*6/mm3 Final     Hemoglobin   Date Value Ref Range Status   10/13/2021 14.8 12.0 - 15.9 g/dL Final     Hematocrit   Date Value Ref Range Status   10/13/2021 46.2 34.0 - 46.6 % Final     Platelets   Date Value Ref Range Status   10/13/2021 293 140 - 450 10*3/mm3 Final                    Assessment/Plan    This patient has had thrombophilia in the past when I saw her in consultation at [facility] in August of 2020 when she presented with a deep vein thrombosis. This was very extensive. She was placed on anticoagulants and she was discharged.  Thrombophilia labs were documented including the fact that she is factor V Leiden positive, heterozygous, prothrombin gene mutation negative and the rest of the thrombophilia labs were negative. The patient has remained anticoagulated since then. She lacked insurance all through 2020 but now she has insurance and she was willing to come and see us. Today we have documented that the patient has tremendous degree of metrorrhagia recently and this triggered very significant anemia with a hemoglobin of 7.5 . Her MCV is low. She denies any GI bleeding. Her diet is appropriate. Her weight is stable. She has no abdominal pain and no pelvic pain. Immobility due to her spina bifida remains ongoing and she mobilizes herself around the house with crutches, here with a small cart.     I gave her the report of her heterozygous factor V Leiden mutation and I explained to her that she needs to discuss with family members on both sides of her family, father and mother who are alive, the need for them to be tested for this. That is very crucial and I asked her to explain to them why it is important.      I advised her that this is a genetic condition that will remain with her for the rest of her life and given her immobility and spina bifida situation is not going to change, I think that she needs to remain on anticoagulants for the rest of her life as well.     I directed also my advice for treatment of her anemia. She is taking a prenatal vitamin and I pointed out to her that she probably needs to take 2 of them a day to see if we can build up her hemoglobin better now that her dose of Eliquis has been adjusted down and see if she bleeds less. I would like for her to return to see us and a nurse and a blood count and a reticulated hemoglobin in 1 month and I would like to review her back in 2 months. If anemia is not corrected with simple methodology, I think she will require IV iron therapy.      I also suggested dietetic changes  including eating some red meat and also leafy things like kale, maria luisa greens, spinach, almonds and oats which are rich in iron as well. She does not like liver or things of this nature.      I asked her also if she continues bleeding in the genitourinary tract to give us a call and she will require to be seen by gynecologist. In the past she has had a fibroid surgery and maybe she will require at some point abdominal and pelvis CT scans.     The other issue that I discussed with her is the need for her eventually to be vaccinated for Covid and I asked her to proceed with this as well whenever this becomes available. I find no formal contraindication for her for this.     I did a breast examination today that is normal and this was performed because on the CT scan of the chest while in the hospital documented breast nodularity. Now that the patient has insurance her primary physician will proceed with mammograms. I agree with this completely.    The patient was further reviewed on 07/14/2021 after I have advised her before to proceed with a hysterectomy. We modified her dose of anticoagulant for this to happen and then she has recovered. Actually today her hemoglobin after hysterectomy is 11.7, and is very good. Did not change too much, before the hysterectomy was 12. The patient remains on her anticoagulant, Eliquis. No clinical bleeding and no new vein thrombosis in her lower extremities.     Her ferritin is pending. Her reticulated hemoglobin is 30. The patient remains on oral ferrous sulfate, prenatal vitamin 3 times a week and I advised her to remain on this medicine for the time being.     From the point of view of her anticoagulation, given her previous history of factor V Leiden heterozygosity and previous extensive DVT, I advised her to remain on the Eliquis for the time being. Because of her paralysis due to spina bifida, she needs to transfer herself by crutches from her electrical transporter device  into the car and I advised her again to be extremely careful in these transfers.     I advised her to return to see me back in 3 months to monitor her CBC, hemoglobin and reticulated hemoglobin, ferritin, iron profile.     The numbers obtained today will be called to her once that they become available.     Otherwise, no other intervention from my point of view.  The patient was further reviewed on 10/13/2021. Since the previous visit the anemia has been completely corrected because the hysterectomy fixed the problem of chronic genitourinary blood loss. The hemoglobin today is up to 14 g. The patient is still taking prenatal vitamins and I asked her to take 1 of them only once a week.     The patient also remains on Eliquis. She knows that she needs to remain on this medicine for the rest of her life. She will use liquids at the dose of 2.5 mg b.i.d.     Otherwise, the patient will continue the routine of care for her comorbidities.     I will review her back in 6 months. She will have on that occasion a CBC, ferritin, iron profile.     Amazes me how strong she is given her comorbidities and all her problems, living on her own, solving the problems of life on her own and living a life that is just worth it given the strength of her mental and physical ability to survive. This is enough to just put a show with her and be able to teach people how to live life.      ·               10/13/2021      CC:

## 2022-02-02 ENCOUNTER — OFFICE VISIT (OUTPATIENT)
Dept: ONCOLOGY | Facility: CLINIC | Age: 52
End: 2022-02-02

## 2022-02-02 ENCOUNTER — LAB (OUTPATIENT)
Dept: LAB | Facility: HOSPITAL | Age: 52
End: 2022-02-02

## 2022-02-02 VITALS
TEMPERATURE: 97.8 F | HEART RATE: 105 BPM | HEIGHT: 58 IN | DIASTOLIC BLOOD PRESSURE: 90 MMHG | OXYGEN SATURATION: 99 % | BODY MASS INDEX: 28.22 KG/M2 | RESPIRATION RATE: 16 BRPM | SYSTOLIC BLOOD PRESSURE: 141 MMHG

## 2022-02-02 DIAGNOSIS — Z79.01 CHRONIC ANTICOAGULATION: ICD-10-CM

## 2022-02-02 DIAGNOSIS — D68.51 FACTOR 5 LEIDEN MUTATION, HETEROZYGOUS: Primary | ICD-10-CM

## 2022-02-02 DIAGNOSIS — D50.0 IRON DEFICIENCY ANEMIA DUE TO CHRONIC BLOOD LOSS: ICD-10-CM

## 2022-02-02 DIAGNOSIS — D68.51 FACTOR 5 LEIDEN MUTATION, HETEROZYGOUS: ICD-10-CM

## 2022-02-02 DIAGNOSIS — D75.1 ERYTHROCYTOSIS: ICD-10-CM

## 2022-02-02 LAB
BASOPHILS # BLD AUTO: 0.05 10*3/MM3 (ref 0–0.2)
BASOPHILS NFR BLD AUTO: 0.4 % (ref 0–1.5)
DEPRECATED RDW RBC AUTO: 43 FL (ref 37–54)
EOSINOPHIL # BLD AUTO: 0.22 10*3/MM3 (ref 0–0.4)
EOSINOPHIL NFR BLD AUTO: 1.9 % (ref 0.3–6.2)
ERYTHROCYTE [DISTWIDTH] IN BLOOD BY AUTOMATED COUNT: 12.8 % (ref 12.3–15.4)
HCT VFR BLD AUTO: 46 % (ref 34–46.6)
HGB BLD-MCNC: 16 G/DL (ref 12–15.9)
HGB RETIC QN AUTO: 36.6 PG (ref 29.8–36.1)
IMM GRANULOCYTES # BLD AUTO: 0.05 10*3/MM3 (ref 0–0.05)
IMM GRANULOCYTES NFR BLD AUTO: 0.4 % (ref 0–0.5)
IMM RETICS NFR: 12.3 % (ref 3–15.8)
LYMPHOCYTES # BLD AUTO: 1.01 10*3/MM3 (ref 0.7–3.1)
LYMPHOCYTES NFR BLD AUTO: 8.9 % (ref 19.6–45.3)
MCH RBC QN AUTO: 32.3 PG (ref 26.6–33)
MCHC RBC AUTO-ENTMCNC: 34.8 G/DL (ref 31.5–35.7)
MCV RBC AUTO: 92.9 FL (ref 79–97)
MONOCYTES # BLD AUTO: 0.71 10*3/MM3 (ref 0.1–0.9)
MONOCYTES NFR BLD AUTO: 6.3 % (ref 5–12)
NEUTROPHILS NFR BLD AUTO: 82.1 % (ref 42.7–76)
NEUTROPHILS NFR BLD AUTO: 9.26 10*3/MM3 (ref 1.7–7)
NRBC BLD AUTO-RTO: 0 /100 WBC (ref 0–0.2)
PLATELET # BLD AUTO: 263 10*3/MM3 (ref 140–450)
PMV BLD AUTO: 10.6 FL (ref 6–12)
RBC # BLD AUTO: 4.95 10*6/MM3 (ref 3.77–5.28)
RETICS # AUTO: 0.11 10*6/MM3 (ref 0.02–0.13)
RETICS/RBC NFR AUTO: 2.18 % (ref 0.7–1.9)
WBC NRBC COR # BLD: 11.3 10*3/MM3 (ref 3.4–10.8)

## 2022-02-02 PROCEDURE — 36415 COLL VENOUS BLD VENIPUNCTURE: CPT

## 2022-02-02 PROCEDURE — 85046 RETICYTE/HGB CONCENTRATE: CPT

## 2022-02-02 PROCEDURE — 85025 COMPLETE CBC W/AUTO DIFF WBC: CPT

## 2022-02-02 PROCEDURE — 99213 OFFICE O/P EST LOW 20 MIN: CPT | Performed by: NURSE PRACTITIONER

## 2022-02-02 NOTE — PROGRESS NOTES
Subjective       REASONS FOR FOLLOW UP  1.  DVT of the left lower extremity due to immobility, paralyzed left lower extremity and minimal mobility due to spina bifida  2.  Factor V Leiden  3. Multifactorial Anemia    History of Present Illness     The patient is a 51 y.o. female with the above-mentioned history returns today to obtain surgical clearance prior to total abdominal hysterectomy, bilateral salpingoectomy due to history of extensive uterine fibroids.  She does remain on low-dose Eliquis at 2.5 mg twice daily.    She continues to feel very well. She tolerates her Eliquis well without signs or symptoms of bleeding. She denies blood in her stool. She does continue on a daily prenatal vitamin. She notes her activity remains more limited due to ongoing working from home schedule. She denies shortness of breath or chest pain. She denies lower extremity swelling.    Past Medical History:   Diagnosis Date   • Bowel and bladder incontinence    • Constipation    • DVT (deep venous thrombosis) (HCC) 2012 2020    BOTH LEGS   • Fibroids     UTERINE   • Heavy periods    • History of anemia    • Muscle weakness     IN LEGS WEARS LEG BRACES   • Paralysis of left lower extremity (HCC)     with minimal mobility to RLE   • Spina bifida (HCC)      Past Surgical History:   Procedure Laterality Date   • LEG SURGERY      MULTIPLE LEG SURGERIES FOR SPINA BIFIDA   • SPINE SURGERY  1971    childhood   • TOTAL ABDOMINAL HYSTERECTOMY N/A 6/23/2021    Procedure: TOTAL ABDOMINAL HYSTERECTOMY, BILATERAL SALPINGECTOMY, RIGHT OOPHRECTOMY, LYSIS OF ADHESIONS ;  Surgeon: Emani Chapa MD;  Location: [facility] OR [place];  Service: Obstetrics/Gynecology;  Laterality: N/A;   • UTERINE FIBROID SURGERY  2012   •  SHUNT INSERTION  1971 1973 1976     Family History   Problem Relation Age of Onset   • Malig Hyperthermia Neg Hx       Social History     Socioeconomic History   • Marital status: Single   Tobacco Use   • Smoking status: Never Smoker  "  • Smokeless tobacco: Never Used   Substance and Sexual Activity   • Alcohol use: Not Currently     Comment: occasionally   • Drug use: Never   • Sexual activity: Defer     Current Outpatient Medications on File Prior to Visit   Medication Sig Dispense Refill   • apixaban (ELIQUIS) 2.5 MG tablet tablet Take 2.5 mg by mouth 2 (Two) Times a Day.     • Cholecalciferol (D-5000) 125 MCG (5000 UT) tablet Take 1 tablet by mouth Daily. 30 tablet 0   • Prenatal MV-Min-Fe Fum-FA-DHA (PRENATAL 1 PO) Take 1 tablet by mouth 2 (Two) Times a Day.     • sennosides-docusate (senna-docusate sodium) 8.6-50 MG per tablet Take 1 tablet by mouth Every Morning.       No current facility-administered medications on file prior to visit.       ALLERGIES:    Allergies   Allergen Reactions   • Hydrolyzed Silk Hives, Itching, Palpitations and Shortness Of Breath       Objective      Vitals:    02/02/22 1334   BP: 141/90   Pulse: 105   Resp: 16   Temp: 97.8 °F (36.6 °C)   TempSrc: Temporal   SpO2: 99%   Weight: Comment: unable to stand   Height: 147.3 cm (57.99\")   PainSc: 0-No pain     Current Status 10/13/2021   ECOG score 3     PHYSICAL EXAM:  GENERAL:  Well-developed, well-nourished in no acute distress.  Seated on a motorized scooter  SKIN:  Warm, dry without rashes, purpura or petechiae.  HEAD:  Normocephalic.  EYES:  Pupils equal, round.  EOMs intact.  Conjunctivae normal.  EARS:  Hearing intact.  NOSE:  Septum midline.  No excoriations or nasal discharge.  CHEST: Breathing unlabored, no acute distress  EXTREMITIES: Braces in place on bilateral lower extremities  NEUROLOGICAL: No focal neurological deficits.  PSYCHIATRIC:  Normal affect and mood.    I have reexamined the patient and the results are consistent with the previously documented exam. Christel Love, CRISTA       RECENT LABS:  Results from last 7 days   Lab Units 02/02/22  1311   WBC 10*3/mm3 11.30*   NEUTROS ABS 10*3/mm3 9.26*   HEMOGLOBIN g/dL 16.0*   HEMATOCRIT % 46.0 "   PLATELETS 10*3/mm3 263               Assessment/Plan      1.  Extensive bilateral DVT  · Initially hospitalized 8/28/2020 through 8/30/2020 with extensive DVT  · Thrombophilia labs documented factor V Leiden heterozygosity  · Prothrombin gene mutation negative, the rest of thrombophilia work-up was negative  · She initiated on Eliquis 5 mg twice daily  · Eliquis was then dose reduced to 2.5 mg twice daily due to anemia in light of bleeding  · She continues with excellent tolerance of Eliquis without signs or symptoms of bleeding  · As outlined above, the patient will be undergoing total abdominal hysterectomy per Dr. Chapa.  She remains on low-dose Eliquis at 2.5 mg twice daily.  After review with Dr. Wade in Dr. Carrasquillo' absence, we will plan to withhold anticoagulation for 72 hours prior to the procedure. From a hematology standpoint, the patient is okay to proceed with surgery.  We will have her take prophylactic Lovenox post procedure, while hospitalized at 40 mg daily.  She will subsequently resume Eliquis at previous dosing of 2.5 mg twice daily once she is discharged  · She continues on Eliquis 2.5 mg twice daily with excellent tolerance    2.  Anemia secondary to menorrhagia  · Significant with hemoglobin as low as 7.5 with low MCV and heavy menstrual cycles  · This is much improved since dose reduction of her Eliquis to 2.5 mg twice daily  · Continues on a prenatal vitamin  · She did undergo hysterectomy with resolution of her anemia  · Continues on a daily prenatal vitamin    3. Mild erythrocytosis and leukocytosis.  · Slight elevation of hemoglobin today at 16.0. WBC 11.3 with normal differential.   · We discussed decreasing prenatal vitamin to every other day.  · Repeat labs with CBC with RN review in 2 months      PLAN:    1. Continue Eliquis 2.5 mg twice daily.    2. Decrease prenatal vitamin to every other day  3. Return in 2 months for CBC with RN review. The patient's white blood cells and red  blood cells have further increased, we may consider additional testing including JAK2 mutation or BCR ABL  4. MD follow-up in 4 months with CBC, reticulated hemoglobin    Today's plan of care was discussed reviewed with Dr. Carrasquillo who is in agreement        Christel Love, CRISTA   2/2/2022      CC:

## 2022-03-30 ENCOUNTER — CLINICAL SUPPORT (OUTPATIENT)
Dept: ONCOLOGY | Facility: HOSPITAL | Age: 52
End: 2022-03-30

## 2022-03-30 ENCOUNTER — LAB (OUTPATIENT)
Dept: LAB | Facility: HOSPITAL | Age: 52
End: 2022-03-30

## 2022-03-30 DIAGNOSIS — D50.0 IRON DEFICIENCY ANEMIA DUE TO CHRONIC BLOOD LOSS: ICD-10-CM

## 2022-03-30 DIAGNOSIS — D68.51 FACTOR 5 LEIDEN MUTATION, HETEROZYGOUS: Primary | ICD-10-CM

## 2022-03-30 LAB
BASOPHILS # BLD AUTO: 0.03 10*3/MM3 (ref 0–0.2)
BASOPHILS NFR BLD AUTO: 0.3 % (ref 0–1.5)
DEPRECATED RDW RBC AUTO: 46.1 FL (ref 37–54)
EOSINOPHIL # BLD AUTO: 0.26 10*3/MM3 (ref 0–0.4)
EOSINOPHIL NFR BLD AUTO: 2.7 % (ref 0.3–6.2)
ERYTHROCYTE [DISTWIDTH] IN BLOOD BY AUTOMATED COUNT: 13.9 % (ref 12.3–15.4)
HCT VFR BLD AUTO: 48.3 % (ref 34–46.6)
HGB BLD-MCNC: 16.3 G/DL (ref 12–15.9)
IMM GRANULOCYTES # BLD AUTO: 0.06 10*3/MM3 (ref 0–0.05)
IMM GRANULOCYTES NFR BLD AUTO: 0.6 % (ref 0–0.5)
LYMPHOCYTES # BLD AUTO: 1.18 10*3/MM3 (ref 0.7–3.1)
LYMPHOCYTES NFR BLD AUTO: 12.3 % (ref 19.6–45.3)
MCH RBC QN AUTO: 31.2 PG (ref 26.6–33)
MCHC RBC AUTO-ENTMCNC: 33.7 G/DL (ref 31.5–35.7)
MCV RBC AUTO: 92.5 FL (ref 79–97)
MONOCYTES # BLD AUTO: 0.52 10*3/MM3 (ref 0.1–0.9)
MONOCYTES NFR BLD AUTO: 5.4 % (ref 5–12)
NEUTROPHILS NFR BLD AUTO: 7.54 10*3/MM3 (ref 1.7–7)
NEUTROPHILS NFR BLD AUTO: 78.7 % (ref 42.7–76)
NRBC BLD AUTO-RTO: 0 /100 WBC (ref 0–0.2)
PLATELET # BLD AUTO: 247 10*3/MM3 (ref 140–450)
PMV BLD AUTO: 10.4 FL (ref 6–12)
RBC # BLD AUTO: 5.22 10*6/MM3 (ref 3.77–5.28)
WBC NRBC COR # BLD: 9.59 10*3/MM3 (ref 3.4–10.8)

## 2022-03-30 PROCEDURE — 36415 COLL VENOUS BLD VENIPUNCTURE: CPT

## 2022-03-30 PROCEDURE — 85025 COMPLETE CBC W/AUTO DIFF WBC: CPT

## 2022-03-30 PROCEDURE — G0463 HOSPITAL OUTPT CLINIC VISIT: HCPCS

## 2022-03-30 NOTE — PROGRESS NOTES
Pt present for RN review. Pt reports she is doing well and voices no concerns. WBC have decreased since last visit. RBC remain elevated. Pt confirms taking all prescribed meds. Copy of labs offered and reviewed schedule. Pt v/u.    Lab Results   Component Value Date    WBC 9.59 03/30/2022    HGB 16.3 (H) 03/30/2022    HCT 48.3 (H) 03/30/2022    MCV 92.5 03/30/2022     03/30/2022

## 2022-05-25 ENCOUNTER — OFFICE VISIT (OUTPATIENT)
Dept: ONCOLOGY | Facility: CLINIC | Age: 52
End: 2022-05-25

## 2022-05-25 ENCOUNTER — DOCUMENTATION (OUTPATIENT)
Dept: ONCOLOGY | Facility: CLINIC | Age: 52
End: 2022-05-25

## 2022-05-25 ENCOUNTER — LAB (OUTPATIENT)
Dept: LAB | Facility: HOSPITAL | Age: 52
End: 2022-05-25

## 2022-05-25 VITALS
DIASTOLIC BLOOD PRESSURE: 91 MMHG | HEIGHT: 58 IN | OXYGEN SATURATION: 99 % | TEMPERATURE: 97.3 F | HEART RATE: 59 BPM | SYSTOLIC BLOOD PRESSURE: 156 MMHG | BODY MASS INDEX: 28.22 KG/M2 | RESPIRATION RATE: 16 BRPM

## 2022-05-25 DIAGNOSIS — D50.0 IRON DEFICIENCY ANEMIA DUE TO CHRONIC BLOOD LOSS: ICD-10-CM

## 2022-05-25 DIAGNOSIS — I82.4Y3 DVT, LOWER EXTREMITY, PROXIMAL, ACUTE, BILATERAL: ICD-10-CM

## 2022-05-25 DIAGNOSIS — D68.51 FACTOR 5 LEIDEN MUTATION, HETEROZYGOUS: ICD-10-CM

## 2022-05-25 DIAGNOSIS — Z79.01 CHRONIC ANTICOAGULATION: ICD-10-CM

## 2022-05-25 DIAGNOSIS — D68.51 FACTOR 5 LEIDEN MUTATION, HETEROZYGOUS: Primary | ICD-10-CM

## 2022-05-25 DIAGNOSIS — Z79.899 HIGH RISK MEDICATION USE: ICD-10-CM

## 2022-05-25 DIAGNOSIS — Q05.2 SPINA BIFIDA OF LUMBOSACRAL REGION WITH HYDROCEPHALUS: ICD-10-CM

## 2022-05-25 LAB
BASOPHILS # BLD AUTO: 0.06 10*3/MM3 (ref 0–0.2)
BASOPHILS NFR BLD AUTO: 0.6 % (ref 0–1.5)
DEPRECATED RDW RBC AUTO: 46.3 FL (ref 37–54)
EOSINOPHIL # BLD AUTO: 0.27 10*3/MM3 (ref 0–0.4)
EOSINOPHIL NFR BLD AUTO: 2.6 % (ref 0.3–6.2)
ERYTHROCYTE [DISTWIDTH] IN BLOOD BY AUTOMATED COUNT: 13.6 % (ref 12.3–15.4)
HCT VFR BLD AUTO: 43.9 % (ref 34–46.6)
HGB BLD-MCNC: 15 G/DL (ref 12–15.9)
HGB RETIC QN AUTO: 34.9 PG (ref 29.8–36.1)
IMM GRANULOCYTES # BLD AUTO: 0.06 10*3/MM3 (ref 0–0.05)
IMM GRANULOCYTES NFR BLD AUTO: 0.6 % (ref 0–0.5)
IMM RETICS NFR: 12.8 % (ref 3–15.8)
LYMPHOCYTES # BLD AUTO: 0.97 10*3/MM3 (ref 0.7–3.1)
LYMPHOCYTES NFR BLD AUTO: 9.4 % (ref 19.6–45.3)
MCH RBC QN AUTO: 31.6 PG (ref 26.6–33)
MCHC RBC AUTO-ENTMCNC: 34.2 G/DL (ref 31.5–35.7)
MCV RBC AUTO: 92.4 FL (ref 79–97)
MONOCYTES # BLD AUTO: 0.59 10*3/MM3 (ref 0.1–0.9)
MONOCYTES NFR BLD AUTO: 5.7 % (ref 5–12)
NEUTROPHILS NFR BLD AUTO: 8.39 10*3/MM3 (ref 1.7–7)
NEUTROPHILS NFR BLD AUTO: 81.1 % (ref 42.7–76)
NRBC BLD AUTO-RTO: 0 /100 WBC (ref 0–0.2)
PLATELET # BLD AUTO: 242 10*3/MM3 (ref 140–450)
PMV BLD AUTO: 10.2 FL (ref 6–12)
RBC # BLD AUTO: 4.75 10*6/MM3 (ref 3.77–5.28)
RETICS # AUTO: 0.09 10*6/MM3 (ref 0.02–0.13)
RETICS/RBC NFR AUTO: 1.82 % (ref 0.7–1.9)
WBC NRBC COR # BLD: 10.34 10*3/MM3 (ref 3.4–10.8)

## 2022-05-25 PROCEDURE — 85025 COMPLETE CBC W/AUTO DIFF WBC: CPT

## 2022-05-25 PROCEDURE — 99214 OFFICE O/P EST MOD 30 MIN: CPT | Performed by: INTERNAL MEDICINE

## 2022-05-25 PROCEDURE — 36415 COLL VENOUS BLD VENIPUNCTURE: CPT

## 2022-05-25 PROCEDURE — 85046 RETICYTE/HGB CONCENTRATE: CPT

## 2022-05-25 NOTE — PROGRESS NOTES
Subjective   DVT LE DUE TO IMMOBILITY PARALYZED LLE AND MINIMAL MOBILITY R LE DUE TO SPINA BIFIDA, HETEROZYGOUS FACTOR V LEIDEN MUTATION          History of Present Illness    DURING THE VISIT WITH THE PATIENT TODAY , PATIENT HAD FACE MASK, I HAD PROPER PROTECTIVE EQUIPMENT, AND I DID HAND HYGIENE WITH SOAP AND WATER BEFORE AND AFTER THE VISIT.    This patient returns today to the office for follow up in her motorized wheelchair. Overall she continues doing fine with no trauma, no falls and no issues related to her spina bifida and her chronic anticoagulation due to previous DVT, PE and heterozygosity for factor V Leiden mutation. The patient's appetite is good, her bowel activity is normal, urination is normal, no vaginal bleeding. She has not developed any swelling in her lower extremities. She continues using her crutches for transfers but mostly she uses her motorized wheelchair.     She is going to run out soon of Eliquis.           HEMATOLOGIC HISTORY:  delightful 50-year-old female who has history of spina bifida. She was born and she has had surgeries for this having paralysis of her left lower extremity and partial movement of her right lower extremity. She gets around on crutches. In any event she has noticed significant degree of swelling in the right lower extremity for the last week or so and she developed some pain and she became concerned about this. She was brought to the emergency room by her family and a Doppler documented a deep vein thrombosis. The patient has been receiving anticoagulants since the admission. She has not had any chest pain, cough, sputum production, shortness of breath, pleuritic pain, hemoptysis. Her appetite has remained good, her weight is stable, her bowel function and urination have remained normal. She has not had any vaginal bleeding. 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 Again the patient gets around with  crutches that push her upper body to get around and she uses the minimal mobility of her right leg to propel herself around the situation.      She has no previous history of thrombophilia. She is not taking any hormone replacement therapy or using any anabolic steroids.      She has not had any recent trauma, no recent traveling anywhere.     The patient was further reviewed in the office on 01/17/2021.  She has noticed some fatigue and she was having significant GYN bleeding in the form of metrorrhagia.  This required a dose adjustment of her Eliquis to 2.5 mg twice a day.  She was advised to come and see us and we have documented today very significant anemia and her hemoglobin was 7.5.  The patient states that the bleeding in the  tract was abundant and continues.  She denies any melena, metrorrhagia or hematuria.  She has fatigue.  She is tired.  She is able to function.  Her work is now done from home.  Her appetite and weight are stable.  No cardiovascular issues.  The spina bifida and all implications of this remain unchanged, requiring crutches to mobilize herself from the house to get to the car to our building.        Past Medical History:   Diagnosis Date   • Bowel and bladder incontinence    • Constipation    • DVT (deep venous thrombosis) (HCC) 2012 2020    BOTH LEGS   • Fibroids     UTERINE   • Heavy periods    • History of anemia    • Muscle weakness     IN LEGS WEARS LEG BRACES   • Paralysis of left lower extremity (HCC)     with minimal mobility to RLE   • Spina bifida (HCC)      Past Surgical History:   Procedure Laterality Date   • LEG SURGERY      MULTIPLE LEG SURGERIES FOR SPINA BIFIDA   • SPINE SURGERY  1971    childhood   • TOTAL ABDOMINAL HYSTERECTOMY N/A 6/23/2021    Procedure: TOTAL ABDOMINAL HYSTERECTOMY, BILATERAL SALPINGECTOMY, RIGHT OOPHRECTOMY, LYSIS OF ADHESIONS ;  Surgeon: Emani Chapa MD;  [place] [facility] [place] [facility] [place]  Service: Obstetrics/Gynecology;  Laterality: N/A;   •  "UTERINE FIBROID SURGERY  2012   •  SHUNT INSERTION  1971 1973 1976     Family History   Problem Relation Age of Onset   • Malig Hyperthermia Neg Hx       Social History     Socioeconomic History   • Marital status: Single   Tobacco Use   • Smoking status: Never Smoker   • Smokeless tobacco: Never Used   Substance and Sexual Activity   • Alcohol use: Not Currently     Comment: occasionally   • Drug use: Never   • Sexual activity: Defer     Current Outpatient Medications on File Prior to Visit   Medication Sig Dispense Refill   • apixaban (ELIQUIS) 2.5 MG tablet tablet Take 2.5 mg by mouth 2 (Two) Times a Day.     • Cholecalciferol (D-5000) 125 MCG (5000 UT) tablet Take 1 tablet by mouth Daily. 30 tablet 0   • Prenatal MV-Min-Fe Fum-FA-DHA (PRENATAL 1 PO) Take 1 tablet by mouth 2 (Two) Times a Day.     • sennosides-docusate (PERICOLACE) 8.6-50 MG per tablet Take 1 tablet by mouth Every Morning.       No current facility-administered medications on file prior to visit.       ALLERGIES:    Allergies   Allergen Reactions   • Hydrolyzed Silk Hives, Itching, Palpitations and Shortness Of Breath       Objective      Vitals:    05/25/22 1332   BP: 156/91   Pulse: 59   Resp: 16   Temp: 97.3 °F (36.3 °C)   TempSrc: Infrared   SpO2: 99%   Weight: Comment: unable to stand for weight   Height: 147.3 cm (57.99\")   PainSc: 0-No pain     Current Status 5/25/2022   ECOG score 1     EXAM :  I HAVE PERSONALLY REVIEWED THE HISTORY OF THE PRESENT ILLNESS, PAST MEDICAL HISTORY, FAMILY HISTORY, SOCIAL HISTORY, ALLERGIES, MEDICATIONS STATED ABOVE IN THE  NOTE FROM TODAY.        GENERAL:  Well-developed, well-nourished  Patient  in no acute distress. IN MOTORIZED WHEEL CHAIR  SKIN:  Warm, dry ,NO purpura ,NO petechiae, no rash.  HEENT:  Pupils were equal and reactive to light and accomodation, conjunctivae noninjected, no pterygium, normal extraocular movements, normal visual acuity.   NECK:  Supple with good range of motion; no " thyromegaly , no other masses, no JVD or bruits,.No carotid artery pain, no carotid abnormal pulsation , NO arterial dance.  LYMPHATICS:  No cervical, NO supraclavicular, NO axillary,NO epitrochlear , NO inguinal adenopathies.  CARDIAC   normal rate , regular rhythm, without murmur,NO rubs NO S3 NO S4   LUNGS: normal breath sounds bilateral, no wheezing, NO rhonchi, NO crackles ,NO rubs.  VASCULAR VENOUS: no cyanosis, NO collateral circulation, NO varicosities, NO edema, NO palpable cords, NO pain,NO erythema, NO pigmentation of the skin.  ABDOMEN:  Soft, NO pain,no hepatomegaly, no splenomegaly,no masses, no ascites, no collateral circulation,no distention,no [place] sign.  EXTREMITIES  AND SPINE:  No clubbing, no cyanosis ,no deformities , no pain .No kyphosis,  no pain in spine, no pain in ribs , no pain in pelvic bone.  NEUROLOGICAL:  Patient was awake, alert, oriented to time, person and place.PARALYSIS OF LE        RECENT LABS:  Hematology WBC   Date Value Ref Range Status   05/25/2022 10.34 3.40 - 10.80 10*3/mm3 Final     RBC   Date Value Ref Range Status   05/25/2022 4.75 3.77 - 5.28 10*6/mm3 Final     Hemoglobin   Date Value Ref Range Status   05/25/2022 15.0 12.0 - 15.9 g/dL Final     Hematocrit   Date Value Ref Range Status   05/25/2022 43.9 34.0 - 46.6 % Final     Platelets   Date Value Ref Range Status   05/25/2022 242 140 - 450 10*3/mm3 Final                    Assessment & Plan    This patient has had thrombophilia in the past when I saw her in consultation at [facility] in August of 2020 when she presented with a deep vein thrombosis. This was very extensive. She was placed on anticoagulants and she was discharged. Thrombophilia labs were documented including the fact that she is factor V Leiden positive, heterozygous, prothrombin gene mutation negative and the rest of the thrombophilia labs were negative. The patient has remained anticoagulated since then. She lacked insurance all  through 2020 but now she has insurance and she was willing to come and see us. Today we have documented that the patient has tremendous degree of metrorrhagia recently and this triggered very significant anemia with a hemoglobin of 7.5 . Her MCV is low. She denies any GI bleeding. Her diet is appropriate. Her weight is stable. She has no abdominal pain and no pelvic pain. Immobility due to her spina bifida remains ongoing and she mobilizes herself around the house with crutches, here with a small cart.     I gave her the report of her heterozygous factor V Leiden mutation and I explained to her that she needs to discuss with family members on both sides of her family, father and mother who are alive, the need for them to be tested for this. That is very crucial and I asked her to explain to them why it is important.      I advised her that this is a genetic condition that will remain with her for the rest of her life and given her immobility and spina bifida situation is not going to change, I think that she needs to remain on anticoagulants for the rest of her life as well.     I directed also my advice for treatment of her anemia. She is taking a prenatal vitamin and I pointed out to her that she probably needs to take 2 of them a day to see if we can build up her hemoglobin better now that her dose of Eliquis has been adjusted down and see if she bleeds less. I would like for her to return to see us and a nurse and a blood count and a reticulated hemoglobin in 1 month and I would like to review her back in 2 months. If anemia is not corrected with simple methodology, I think she will require IV iron therapy.      I also suggested dietetic changes including eating some red meat and also leafy things like kale, maria luisa greens, spinach, almonds and oats which are rich in iron as well. She does not like liver or things of this nature.      I asked her also if she continues bleeding in the genitourinary tract to give  us a call and she will require to be seen by gynecologist. In the past she has had a fibroid surgery and maybe she will require at some point abdominal and pelvis CT scans.     The other issue that I discussed with her is the need for her eventually to be vaccinated for Covid and I asked her to proceed with this as well whenever this becomes available. I find no formal contraindication for her for this.     I did a breast examination today that is normal and this was performed because on the CT scan of the chest while in the hospital documented breast nodularity. Now that the patient has insurance her primary physician will proceed with mammograms. I agree with this completely.    The patient was further reviewed on 07/14/2021 after I have advised her before to proceed with a hysterectomy. We modified her dose of anticoagulant for this to happen and then she has recovered. Actually today her hemoglobin after hysterectomy is 11.7, and is very good. Did not change too much, before the hysterectomy was 12. The patient remains on her anticoagulant, Eliquis. No clinical bleeding and no new vein thrombosis in her lower extremities.     Her ferritin is pending. Her reticulated hemoglobin is 30. The patient remains on oral ferrous sulfate, prenatal vitamin 3 times a week and I advised her to remain on this medicine for the time being.     From the point of view of her anticoagulation, given her previous history of factor V Leiden heterozygosity and previous extensive DVT, I advised her to remain on the Eliquis for the time being. Because of her paralysis due to spina bifida, she needs to transfer herself by crutches from her electrical transporter device into the car and I advised her again to be extremely careful in these transfers.     I advised her to return to see me back in 3 months to monitor her CBC, hemoglobin and reticulated hemoglobin, ferritin, iron profile.     The numbers obtained today will be called to her  once that they become available.     Otherwise, no other intervention from my point of view.  The patient was further reviewed on 10/13/2021. Since the previous visit the anemia has been completely corrected because the hysterectomy fixed the problem of chronic genitourinary blood loss. The hemoglobin today is up to 14 g. The patient is still taking prenatal vitamins and I asked her to take 1 of them only once a week.     The patient also remains on Eliquis. She knows that she needs to remain on this medicine for the rest of her life. She will use liquids at the dose of 2.5 mg b.i.d.     Otherwise, the patient will continue the routine of care for her comorbidities.     I will review her back in 6 months. She will have on that occasion a CBC, ferritin, iron profile.     Amazes me how strong she is given her comorbidities and all her problems, living on her own, solving the problems of life on her own and living a life that is just worth it given the strength of her mental and physical ability to survive. This is enough to just put a show with her and be able to teach people how to live life.      The patient was further reviewed on 05/25/2022. Since the previous visit she has continued her anticoagulant with no difficulty with the consecution of the medicine. She has not had any clinical bleeding, no new thrombosis. Her white count, hemoglobin and platelets are normal today.     The patient remains using her motorized wheelchair in order to move from one place to the other and she continues using this on a routine basis. Most of the transfers are done with her crutches.     It amazes me how strong she is to be able to do all of these things on her own. She continues working from home.     Her anemia is resolved after iron administration in the background of profound iron deficiency anemia due to fibroids.     RECOMMENDATIONS: The patient has been advised to remain on Eliquis. I made a note to send to Ms. Lindsay  Yogi in regard to be sure that the patient is able to afford the copayment's of this medicine and MTD team will be supporting her. Other than that we will review her back in 6 months. She will have a CBC, reticulated hemoglobin at that time.     The patient was advised to continue monitoring her transfers because of the potentiality for fall and in the background of anticoagulation use this could have consequences on her. She is aware of those problems.     ·               5/25/2022      CC:

## 2022-05-25 NOTE — PROGRESS NOTES
Staff message rec from Dr Carrasquillo-Pt will run out of Eliquis soon and he asked for me to contact pt about her copay and such. I so not see where pt has been getting this filled-no dispense history populated for Eliquis.    I attempted to submit a test claim through "[employer] with the current insurance on file effective 1/1/21 and it returned rejected stating pt is not eligible due to non payment of premiums.    I will contact pt to see if this is actually correct or if she has other insurance not provided to the office today.    Jerald Carrasquillo MD sent to Angélica Calix.  She will run out of eliquis soon call her in regard copayment's and so forth, so call mtd team?     Angélica Calix  Specialty Pharmacy Technician

## 2022-07-11 ENCOUNTER — TELEPHONE (OUTPATIENT)
Dept: ONCOLOGY | Facility: CLINIC | Age: 52
End: 2022-07-11

## 2022-07-20 ENCOUNTER — DOCUMENTATION (OUTPATIENT)
Dept: ONCOLOGY | Facility: CLINIC | Age: 52
End: 2022-07-20

## 2022-07-20 NOTE — PROGRESS NOTES
Pt came into the office today to sign the application for FREE Elquis through [employer].     Application faxed to [phone]    Angélica Calix  Specialty Pharmacy Technician

## 2022-07-27 ENCOUNTER — DOCUMENTATION (OUTPATIENT)
Dept: ONCOLOGY | Facility: CLINIC | Age: 52
End: 2022-07-27

## 2022-07-27 NOTE — PROGRESS NOTES
Fax rec from [employer] (manufacture assistance for Eliquis)-the letter states that pts income is over their current eligibility criteria.    Pt has NO active insurance that can be found. Her [employer] states it ended 4/2022.    Message sent to Mirta SILVEIRA Clinical RN and I have asked for pt to be brought to the office for samples until I can appeal with [employer].    Angélica Cailx  Specialty Pharmacy Technician

## 2022-07-29 ENCOUNTER — APPOINTMENT (OUTPATIENT)
Dept: LAB | Facility: HOSPITAL | Age: 52
End: 2022-07-29

## 2022-08-01 ENCOUNTER — APPOINTMENT (OUTPATIENT)
Dept: LAB | Facility: HOSPITAL | Age: 52
End: 2022-08-01

## 2022-08-05 ENCOUNTER — TELEPHONE (OUTPATIENT)
Dept: ONCOLOGY | Facility: CLINIC | Age: 52
End: 2022-08-05

## 2022-08-05 NOTE — TELEPHONE ENCOUNTER
Caller: Lucien Lopez    Relationship: Self    Best call back number: [phone]    What is the best time to reach you: ANYTIME    Who are you requesting to speak with (clinical staff, provider,  specific staff member): LUCIEN GRACE    What was the call regarding: PT STATED SHE CALLED [employer] AND BELIEVES HER INS ISSUE IS RESOLVED. STILL WILL NOT VERIFY IN OUR SYSTEM.     PLEASE CALL PT TO ADVISE.     Do you require a callback: YES

## 2022-09-06 ENCOUNTER — TELEPHONE (OUTPATIENT)
Dept: ONCOLOGY | Facility: CLINIC | Age: 52
End: 2022-09-06

## 2022-09-06 NOTE — TELEPHONE ENCOUNTER
Caller: LUCIEN    Relationship to patient: SELF    Best call back number: [phone]    Patient is needing: TO REQUEST CALL FROM LUCIEN GRACE. SHE HAD A MEDICATION INSURANCE ISSUE REGARDING ELIQUIS.

## 2022-09-14 ENCOUNTER — TELEPHONE (OUTPATIENT)
Dept: ONCOLOGY | Facility: CLINIC | Age: 52
End: 2022-09-14

## 2022-09-14 NOTE — TELEPHONE ENCOUNTER
Caller: Lucien Lopez    Relationship: Self    Best call back number: [phone]    What was the call regarding: LUCIEN CALLED REGARDING HER ELIQUIS MEDICATION. SHE WILL RUN OUT ON Friday AND IS WONDERING WHAT SHE SHOULD DO FOR A REFILL. SHE SAYS SHE HAS BEEN GETTING SAMPLES.  SHE ALSO SAYS HER CELL NUMBER IS A SECURE LINE, SO IF SHE DOES NOT ANSWER, PLEASE LEAVE A MESSAGE.    Do you require a callback: YES

## 2022-09-15 NOTE — TELEPHONE ENCOUNTER
Attempted to call patient. No answer. Left voicemail. Sample order placed. Pt on vitals only schedule tomorrow at 11am. Left details in voicemail as well as direct line [phone] for questions. Nely Wagner RN

## 2022-09-16 ENCOUNTER — APPOINTMENT (OUTPATIENT)
Dept: LAB | Facility: HOSPITAL | Age: 52
End: 2022-09-16

## 2022-09-28 ENCOUNTER — TELEPHONE (OUTPATIENT)
Dept: ONCOLOGY | Facility: CLINIC | Age: 52
End: 2022-09-28

## 2022-10-14 ENCOUNTER — TELEPHONE (OUTPATIENT)
Dept: ONCOLOGY | Facility: CLINIC | Age: 52
End: 2022-10-14

## 2022-10-14 NOTE — TELEPHONE ENCOUNTER
Caller: Angélica Lopez    Relationship: Self    Best call back number: [phone]    What is the best time to reach you: ANYTIME    CAN LEAVE VOICEMAIL WITH NAME AND NUMBER TO CALL BACK IF UNABLE TO REACH, IS BUSINESS PHONE, BUT IS A CONFIDENTIAL VOICEMAIL.     Who are you requesting to speak with (clinical staff, provider,  specific staff member): FINANCIAL ASSISTANCE/ NEXT STEPS NO INSURANCE       What was the call regarding:     HAD LAST CALLED IN 09/28, REGARDING NO LONGER HAS THE [employer] INSURANCE,  AND HAS BEEN GETTING SAMPLES OF ELIQUIS, REACHING OUT TO SEE WHAT NEXT STEPS WOULD BE IF CONTINUES TO GET THE SAMPLES OR WHAT SHOULD DO FROM HERE, WILL BE ABLE TO REAPPLY FOR INSURANCE ,BUT WOULD NOT TAKE EFFECT TO THE FIRST OF NEXT YEAR,      WILBER RIVERA DID CALL AND LEFT A MESSAGE. 09/28    HAS ABOUT TWO WEEKS OF THE ELIQUIS     Do you require a callback:YES TO FOLLOW UP

## 2022-10-17 ENCOUNTER — DOCUMENTATION (OUTPATIENT)
Dept: ONCOLOGY | Facility: CLINIC | Age: 52
End: 2022-10-17

## 2022-10-17 NOTE — PROGRESS NOTES
HUB call from pt forwarded to me-She was calling to check on next steps for getting her Eliquis. She currently has NO insurance and her new insurance will take effect in January.    With pt being UNinsured-[facility] will be able to help. I attempted to contact pt to explain how to move forward but had to leave a VM. My direct line and office hours left in the message.    Caller: Angélica Lopez     Relationship: Self     Best call back number: [phone]     What is the best time to reach you: ANYTIME     CAN LEAVE VOICEMAIL WITH NAME AND NUMBER TO CALL BACK IF UNABLE TO REACH, IS BUSINESS PHONE, BUT IS A CONFIDENTIAL VOICEMAIL.      Who are you requesting to speak with (clinical staff, provider,  specific staff member): FINANCIAL ASSISTANCE/ NEXT STEPS NO INSURANCE         What was the call regarding:      HAD LAST CALLED IN 09/28, REGARDING NO LONGER HAS THE [employer] INSURANCE,  AND HAS BEEN GETTING SAMPLES OF ELIQUIS, REACHING OUT TO SEE WHAT NEXT STEPS WOULD BE IF CONTINUES TO GET THE SAMPLES OR WHAT SHOULD DO FROM HERE, WILL BE ABLE TO REAPPLY FOR INSURANCE ,BUT WOULD NOT TAKE EFFECT TO THE FIRST OF NEXT YEAR,       WILBER NICOLE DID CALL AND LEFT A MESSAGE. 09/28     HAS ABOUT TWO WEEKS OF THE ELIQUIS      Do you require a callback:YES TO FOLLOW UP        Angélica Calix  Specialty Pharmacy Technician

## 2022-10-24 ENCOUNTER — SPECIALTY PHARMACY (OUTPATIENT)
Dept: ONCOLOGY | Facility: CLINIC | Age: 52
End: 2022-10-24

## 2022-10-24 ENCOUNTER — SPECIALTY PHARMACY (OUTPATIENT)
Dept: ONCOLOGY | Facility: HOSPITAL | Age: 52
End: 2022-10-24

## 2022-10-24 NOTE — PROGRESS NOTES
MTM Encounter-Re: Adherence and side effects (Eliquis)    Today's encounter was conducted via telemedicine.    Medication:  Eliquis 2.5 mg po bid  - Reason for outreach: Routine medication check-in .  - Administration: Patient is taking every day at the same time  and twice daily.  - Missed doses: Patient reports missing 1 doses in the last 30 days.  - Self-administration: Patient demonstrates ability to self-administer medication. No barriers to adherence identified.   - Diagnosis/Indication: Factor V Leiden. Progress toward achieving therapeutic goals reviewed.   - Patient denies side effects.    - Medication availability/affordability: Patient has had has had issues obtaining medication from pharmacy: Transition to fill at [facility] .   - Questions/concerns about medications: none       Completed medication reconciliation today to assess for drug interactions.   Reviewed allergies, medical history, labs, quality of life, and medication history with patient.   Patient denies starting or stopping any medications.  Assessed medication list for interactions, no significant drug interactions noted.   Advised pt to call the clinic if any new medications are started so we can assess for drug-drug interactions.     All questions addressed. Patient had no additional concerns for MTM office.     Alicia Damon RPH  10/24/2022  13:18 EDT

## 2022-10-24 NOTE — PROGRESS NOTES
Specialty Pharmacy Refill Coordination Note     Angélica is a 52 y.o. female contacted today regarding refills of Eliquis specialty medication(s).    Reviewed and verified with patient:       Specialty medication(s) and dose(s) confirmed: yes  Eliquis 2.5 mg twice per day    Refill Questions    Flowsheet Row Most Recent Value   Changes to allergies? No   Changes to medications? No   New conditions since last clinic visit No   Unplanned office visit, urgent care, ED, or hospital admission in the last 4 weeks  No   How does patient/caregiver feel medication is working? Good   Financial problems or insurance changes  Yes   If yes, describe changes in insurance or financial issues. Pt has NO insurance until 1/1/2023   Since the previous refill, were any specialty medication doses or scheduled injections missed or delayed?  No   Does this patient require a clinical escalation to a pharmacist? No          Delivery Questions    Flowsheet Row Most Recent Value   Delivery method FedEx  [FedEx Priority-Ship 10/25 for abigail 10/26-$0 copay-Address Confirmed]   Delivery address correct? Yes  [Ship to home address]   Delivery phone number [phone]   Number of medications in delivery 1   Medication being filled and delivered Eliquis   Doses left of specialty medications 5 days   Is there any medication that is due not being filled? No   Supplies needed? No supplies needed   Cooler needed? No   Do any medications need mixed or dated? No   Copay form of payment Payment plan already set up   Additional comments $0 copay with PPAF   Questions or concerns for the pharmacist? No   Explain any questions or concerns for the pharmacist N/A   Are any medications first time fills? No  [Converted to  [facility]]        Eliquis delivery coordinated with pt for 10/25/22. We have converted her fills to  [facility] due to cost. No questions or concerns to report to MTM Team.     Follow-up: 21 day(s)     Angélica Calix  Specialty  Pharmacy Technician

## 2022-10-26 ENCOUNTER — DOCUMENTATION (OUTPATIENT)
Dept: ONCOLOGY | Facility: CLINIC | Age: 52
End: 2022-10-26

## 2022-10-26 NOTE — PROGRESS NOTES
Eliquis supply from [facility] delivered to pt on 10/25/22 at 9:37 am.    [facility] tracking # [id number]     Angélica Calix  Specialty Pharmacy Technician

## 2022-11-09 ENCOUNTER — LAB (OUTPATIENT)
Dept: LAB | Facility: HOSPITAL | Age: 52
End: 2022-11-09

## 2022-11-09 DIAGNOSIS — Q05.2 SPINA BIFIDA OF LUMBOSACRAL REGION WITH HYDROCEPHALUS: ICD-10-CM

## 2022-11-09 DIAGNOSIS — D50.0 IRON DEFICIENCY ANEMIA DUE TO CHRONIC BLOOD LOSS: ICD-10-CM

## 2022-11-09 DIAGNOSIS — Z79.899 HIGH RISK MEDICATION USE: ICD-10-CM

## 2022-11-09 DIAGNOSIS — D68.51 FACTOR 5 LEIDEN MUTATION, HETEROZYGOUS: ICD-10-CM

## 2022-11-09 DIAGNOSIS — Z79.01 CHRONIC ANTICOAGULATION: ICD-10-CM

## 2022-11-09 DIAGNOSIS — I82.4Y3 DVT, LOWER EXTREMITY, PROXIMAL, ACUTE, BILATERAL: ICD-10-CM

## 2022-11-09 LAB
BASOPHILS # BLD AUTO: 0.03 10*3/MM3 (ref 0–0.2)
BASOPHILS NFR BLD AUTO: 0.3 % (ref 0–1.5)
DEPRECATED RDW RBC AUTO: 48.4 FL (ref 37–54)
EOSINOPHIL # BLD AUTO: 0.4 10*3/MM3 (ref 0–0.4)
EOSINOPHIL NFR BLD AUTO: 3.6 % (ref 0.3–6.2)
ERYTHROCYTE [DISTWIDTH] IN BLOOD BY AUTOMATED COUNT: 13.4 % (ref 12.3–15.4)
HCT VFR BLD AUTO: 47.4 % (ref 34–46.6)
HGB BLD-MCNC: 16.4 G/DL (ref 12–15.9)
HGB RETIC QN AUTO: 37 PG (ref 29.8–36.1)
IMM GRANULOCYTES # BLD AUTO: 0.05 10*3/MM3 (ref 0–0.05)
IMM GRANULOCYTES NFR BLD AUTO: 0.5 % (ref 0–0.5)
IMM RETICS NFR: 13.5 % (ref 3–15.8)
LYMPHOCYTES # BLD AUTO: 1.44 10*3/MM3 (ref 0.7–3.1)
LYMPHOCYTES NFR BLD AUTO: 13 % (ref 19.6–45.3)
MCH RBC QN AUTO: 33.7 PG (ref 26.6–33)
MCHC RBC AUTO-ENTMCNC: 34.6 G/DL (ref 31.5–35.7)
MCV RBC AUTO: 97.3 FL (ref 79–97)
MONOCYTES # BLD AUTO: 0.53 10*3/MM3 (ref 0.1–0.9)
MONOCYTES NFR BLD AUTO: 4.8 % (ref 5–12)
NEUTROPHILS NFR BLD AUTO: 77.8 % (ref 42.7–76)
NEUTROPHILS NFR BLD AUTO: 8.61 10*3/MM3 (ref 1.7–7)
NRBC BLD AUTO-RTO: 0 /100 WBC (ref 0–0.2)
PLATELET # BLD AUTO: 252 10*3/MM3 (ref 140–450)
PMV BLD AUTO: 10.7 FL (ref 6–12)
RBC # BLD AUTO: 4.87 10*6/MM3 (ref 3.77–5.28)
RETICS # AUTO: 0.1 10*6/MM3 (ref 0.02–0.13)
RETICS/RBC NFR AUTO: 2.14 % (ref 0.7–1.9)
WBC NRBC COR # BLD: 11.06 10*3/MM3 (ref 3.4–10.8)

## 2022-11-09 PROCEDURE — 36415 COLL VENOUS BLD VENIPUNCTURE: CPT

## 2022-11-09 PROCEDURE — 85046 RETICYTE/HGB CONCENTRATE: CPT

## 2022-11-09 PROCEDURE — 85025 COMPLETE CBC W/AUTO DIFF WBC: CPT

## 2022-11-10 ENCOUNTER — TELEPHONE (OUTPATIENT)
Dept: ONCOLOGY | Facility: CLINIC | Age: 52
End: 2022-11-10

## 2022-11-10 DIAGNOSIS — I82.4Y3 DVT, LOWER EXTREMITY, PROXIMAL, ACUTE, BILATERAL: ICD-10-CM

## 2022-11-10 DIAGNOSIS — D68.51 FACTOR 5 LEIDEN MUTATION, HETEROZYGOUS: Primary | ICD-10-CM

## 2022-11-22 ENCOUNTER — SPECIALTY PHARMACY (OUTPATIENT)
Dept: ONCOLOGY | Facility: CLINIC | Age: 52
End: 2022-11-22

## 2022-11-22 NOTE — PROGRESS NOTES
Specialty Pharmacy Refill Coordination Note     Angélica is a 52 y.o. female contacted today regarding refills of Eliquis medication(s).    Reviewed and verified with patient:       Specialty medication(s) and dose(s) confirmed: yes  Eliquis 2.5 mg twice per day    Refill Questions    Flowsheet Row Most Recent Value   Changes to allergies? No   Changes to medications? No   New conditions since last clinic visit No   Unplanned office visit, urgent care, ED, or hospital admission in the last 4 weeks  No   How does patient/caregiver feel medication is working? Good   Financial problems or insurance changes  No   If yes, describe changes in insurance or financial issues. Pt applied to Medicaid-currently pending   Since the previous refill, were any specialty medication doses or scheduled injections missed or delayed?  No   Does this patient require a clinical escalation to a pharmacist? No          Delivery Questions    Flowsheet Row Most Recent Value   Delivery method Other (Comment)  [[place] to home address 11/23/22-$0 copay-Address Confirmed]   Delivery address correct? Yes  [Ship to home address]   Delivery phone number [phone]   Number of medications in delivery 1   Medication being filled and delivered Eliquis   Doses left of specialty medications Enough until 11/24   Is there any medication that is due not being filled? No   Supplies needed? No supplies needed   Cooler needed? No   Do any medications need mixed or dated? No   Copay form of payment Payment plan already set up   Additional comments $0 copay with PPAF   Questions or concerns for the pharmacist? No   Explain any questions or concerns for the pharmacist N/A   Are any medications first time fills? No        Eliquis delivery coordinated with pt for [place] to home address 11/23/22. $0 copay with PPAF. Her last delivery was on 10/25/22. No questions or concerns to report to MTM Team today.     Follow-up: 21 day(s)     Angélica Calix  Specialty  Pharmacy Technician

## 2022-12-21 ENCOUNTER — SPECIALTY PHARMACY (OUTPATIENT)
Dept: ONCOLOGY | Facility: CLINIC | Age: 52
End: 2022-12-21

## 2022-12-21 NOTE — PROGRESS NOTES
Specialty Pharmacy Refill Coordination Note     Angélica is a 52 y.o. female contacted today regarding refills of Eliquis medication(s).    Reviewed and verified with patient:       Specialty medication(s) and dose(s) confirmed: yes  Eliquis 2.5 mg twice per day    Refill Questions    Flowsheet Row Most Recent Value   Changes to allergies? No   Changes to medications? No   New conditions since last clinic visit No   Unplanned office visit, urgent care, ED, or hospital admission in the last 4 weeks  No   How does patient/caregiver feel medication is working? Good   Financial problems or insurance changes  Yes   If yes, describe changes in insurance or financial issues. Pt will have [employer] insurance as of 1/1/2023   Since the previous refill, were any specialty medication doses or scheduled injections missed or delayed?  No   Does this patient require a clinical escalation to a pharmacist? No          Delivery Questions    Flowsheet Row Most Recent Value   Delivery method FedEx  [FedEx Priority-Ship 12/22 for delivery 12/23-Address Confirmed-[employer] Pt-uninsured until 1/1/23]   Delivery phone number [phone]   Preferred delivery time? AM   Number of medications in delivery 1   Medication being filled and delivered Eliquis   Doses left of specialty medications Enough until 12/24/22   Is there any medication that is due not being filled? No   Supplies needed? No supplies needed   Cooler needed? No   Do any medications need mixed or dated? No   Copay form of payment Payment plan already set up   Additional comments Pt will have [employer] as of 1/1/2023   Questions or concerns for the pharmacist? No   Explain any questions or concerns for the pharmacist N/A   Are any medications first time fills? No        Eliquis delivery coordinated with pt for 12/23/22 to her home address. Pt will have [employer] insurance as of 1/1/2023. Her last delivery was on 11/23/2022. No questions or concerns to report to MTM Team today.      Follow-up: 21 day(s)     Angélica Calix  Specialty Pharmacy Technician

## 2023-01-20 ENCOUNTER — SPECIALTY PHARMACY (OUTPATIENT)
Dept: PHARMACY | Facility: HOSPITAL | Age: 53
End: 2023-01-20

## 2023-01-20 ENCOUNTER — DOCUMENTATION (OUTPATIENT)
Dept: PHARMACY | Facility: HOSPITAL | Age: 53
End: 2023-01-20

## 2023-01-20 NOTE — PROGRESS NOTES
I have left pt 2 VM's asking for a return call RE: Eliquis. The VM left for her today was detailed asking for a return call back today to coordinate the Eliquis delivery. The breezy used previously to cover her copay cost is no longer available after today so a call today is very important.     Angélica Calix  Specialty Pharmacy Technician

## 2023-01-20 NOTE — PROGRESS NOTES
Specialty Pharmacy Refill Coordination Note     Angélica is a 52 y.o. female contacted today regarding refills of Eliquis medication(s).    Reviewed and verified with patient:       Specialty medication(s) and dose(s) confirmed: yes  Eliquis 2.5 mg twice per day    Refill Questions    Flowsheet Row Most Recent Value   Changes to allergies? No   Changes to medications? No   New conditions since last clinic visit No   Unplanned office visit, urgent care, ED, or hospital admission in the last 4 weeks  No   How does patient/caregiver feel medication is working? Good   Financial problems or insurance changes  Yes  [Pt is waiting for her insurance card-states active but eligibility check in [facility] returns no insurance]   If yes, describe changes in insurance or financial issues. Pt is waiting for her insurance card-states active but eligibility check in [facility] returns no insurance   Since the previous refill, were any specialty medication doses or scheduled injections missed or delayed?  No   Does this patient require a clinical escalation to a pharmacist? No          Delivery Questions    Flowsheet Row Most Recent Value   Delivery method FedEx  [FedEx Priority-Ship 1/23 for delivery 1/24-No active insurance yet, no decision on application-Address Confirmed]   Delivery address correct? Yes  [Ship to home address]   Delivery phone number [phone]   Preferred delivery time? AM   Number of medications in delivery 1   Medication being filled and delivered Eliquis   Doses left of specialty medications Enough until 1/24 or 1/25   Is there any medication that is due not being filled? No   Supplies needed? No supplies needed   Cooler needed? No   Do any medications need mixed or dated? No   Copay form of payment Need to discuss financial assistance   Additional comments Pt currently waiting for insurance-no decision made on application for free Eliquis   Questions or concerns for the pharmacist? No   Explain any questions or  concerns for the pharmacist N/A   Are any medications first time fills? No        Eliquis delivery coordinated with pt for 1/24/2023 to her home address. PPAF no longer available-other means of assistance being explored. She is waiting for her insurance card to come in the mail. Eligibility check in [facility] returned no active coverage. Her last delivery was on 12/23/2022. No questions or concerns to report to MTM Team today.     Follow-up: 21 day(s)     Angélica Calix  Specialty Pharmacy Technician

## 2023-02-01 ENCOUNTER — DOCUMENTATION (OUTPATIENT)
Dept: PHARMACY | Facility: HOSPITAL | Age: 53
End: 2023-02-01

## 2023-02-01 NOTE — PROGRESS NOTES
Pt called me with her rx plan information-I ran the Eliquis through [facility] and her copay returned at $534.28. Her insurance is not covering anything at this time due to an $8000 deductible. She is eligible for the Eliquis copay card which I have signed her up for. This dropped her copay to $10 on the test claim. They have a yearly max benefit of $6400.    All the information was relayed to pt and I will contact her in a couple weeks to coordinate delivery. The copay of $10 is affordable for her.    Angélica Calix  Specialty Pharmacy Technician

## 2023-02-17 ENCOUNTER — SPECIALTY PHARMACY (OUTPATIENT)
Dept: PHARMACY | Facility: HOSPITAL | Age: 53
End: 2023-02-17

## 2023-02-17 NOTE — PROGRESS NOTES
Specialty Pharmacy Refill Coordination Note     Angélica is a 52 y.o. female contacted today regarding refills of Eliquis medication(s).    Reviewed and verified with patient:       Specialty medication(s) and dose(s) confirmed: yes  Eliquis 2.5 mg twice per day    Refill Questions    Flowsheet Row Most Recent Value   Changes to allergies? No   Changes to medications? No   New conditions since last clinic visit No   Unplanned office visit, urgent care, ED, or hospital admission in the last 4 weeks  No   How does patient/caregiver feel medication is working? Good   Financial problems or insurance changes  No   If yes, describe changes in insurance or financial issues. N/A   Since the previous refill, were any specialty medication doses or scheduled injections missed or delayed?  No   Does this patient require a clinical escalation to a pharmacist? No          Delivery Questions    Flowsheet Row Most Recent Value   Delivery method FedEx  [FedEx Standard-Ship 2/20 for delivery 2/21-$10 copay with copay card-[employer]-Address Confirmed]   Delivery address correct? Yes  [Ship to home address]   Delivery phone number [phone]   Preferred delivery time? AM   Number of medications in delivery 1   Medication being filled and delivered Eliquis   Doses left of specialty medications 1 week   Is there any medication that is due not being filled? No   Supplies needed? No supplies needed   Cooler needed? No   Do any medications need mixed or dated? No   Copay form of payment Credit card on file   Additional comments $10 copay with copay card-[employer]   Questions or concerns for the pharmacist? No   Explain any questions or concerns for the pharmacist N/A   Are any medications first time fills? No          Eliquis delivery coordinated with pt for 2/21/2023 to her home address. $10 copay with copay card-[employer]. Her last delivery was on 1/24/23. No questions or concerns to report to MTM Team today.     Follow-up: 21 day(s)     Angélica CRAVENHugh  Yogi  Specialty Pharmacy Technician

## 2023-03-22 ENCOUNTER — SPECIALTY PHARMACY (OUTPATIENT)
Dept: PHARMACY | Facility: HOSPITAL | Age: 53
End: 2023-03-22

## 2023-03-22 NOTE — PROGRESS NOTES
Specialty Pharmacy Refill Coordination Note     Angélica is a 52 y.o. female contacted today regarding refills of Eliquis medication(s).    Reviewed and verified with patient:       Specialty medication(s) and dose(s) confirmed: yes  Eliquis 2.5 mg twice per day    Refill Questions    Flowsheet Row Most Recent Value   Changes to allergies? No   Changes to medications? No   New conditions since last clinic visit No   Unplanned office visit, urgent care, ED, or hospital admission in the last 4 weeks  No   How does patient/caregiver feel medication is working? Good   Financial problems or insurance changes  No   If yes, describe changes in insurance or financial issues. N/A   Since the previous refill, were any specialty medication doses or scheduled injections missed or delayed?  No   Does this patient require a clinical escalation to a pharmacist? No          Delivery Questions    Flowsheet Row Most Recent Value   Delivery method FedEx  [FedEx Standard-Ship 3/23 for delivery 3/24-$10 copay with copay card-[employer]-Address confirmed]   Delivery address correct? Yes  [Ship to home address]   Delivery phone number [phone]   Preferred delivery time? AM   Number of medications in delivery 1   Medication being filled and delivered Eliquis   Doses left of specialty medications 3 days (enough until 3/24/2023)   Is there any medication that is due not being filled? No   Supplies needed? No supplies needed   Cooler needed? No   Do any medications need mixed or dated? No   Copay form of payment Credit card on file   Additional comments $10 copay with copay card-[employer]   Questions or concerns for the pharmacist? No   Explain any questions or concerns for the pharmacist N/A   Are any medications first time fills? No        Eliquis delivery coordinated with pt fo r3/24/2023 to her home address. $10 copay with copay card-[employer]. Her last delivery was on 2/21/2023. No questions or concerns to report to MTM Team today.     Follow-up: 21  rola(s)     Angélica Calix  Specialty Pharmacy Technician

## 2023-03-26 NOTE — TELEPHONE ENCOUNTER
Problem: Breathing Pattern Ineffective  Goal: Air exchange is effective, demonstrated by Sp02 sat of greater then or = 92% (or as ordered)  Outcome: Outcome Not Met, Continue to Monitor     Problem: At Risk for Falls  Goal: # Patient does not fall  Outcome: Outcome Not Met, Continue to Monitor      [facility] pharmacy from [facility] for the patient assistance program called to confirm the strength of the Eliquis that they were  To dispense. After looking in the chart, I told them it was the 2.5 mg. Which was the strength hand-written on the form.

## 2023-04-19 ENCOUNTER — SPECIALTY PHARMACY (OUTPATIENT)
Dept: PHARMACY | Facility: HOSPITAL | Age: 53
End: 2023-04-19

## 2023-04-19 NOTE — PROGRESS NOTES
Specialty Pharmacy Refill Coordination Note     Angélica is a 52 y.o. female contacted today regarding refills of Eliquis medication(s).    Reviewed and verified with patient:       Specialty medication(s) and dose(s) confirmed: yes  Eliquis 2.5 mg twice per day    Refill Questions    Flowsheet Row Most Recent Value   Changes to allergies? No   Changes to medications? No   New conditions since last clinic visit No   Unplanned office visit, urgent care, ED, or hospital admission in the last 4 weeks  No   How does patient/caregiver feel medication is working? Good   Financial problems or insurance changes  No   If yes, describe changes in insurance or financial issues. N/A   Since the previous refill, were any specialty medication doses or scheduled injections missed or delayed?  No   Does this patient require a clinical escalation to a pharmacist? No          Delivery Questions    Flowsheet Row Most Recent Value   Delivery method FedEx  [FedEx Standard-Ship 4/20 for delivery 4/21-$10 copay with copay card-[employer]-Address Confirmed]   Delivery address correct? Yes  [Ship to home address]   Delivery phone number [phone]   Preferred delivery time? AM   Number of medications in delivery 1   Medication being filled and delivered Eliquis   Doses left of specialty medications 7 days   Is there any medication that is due not being filled? No   Supplies needed? No supplies needed   Cooler needed? No   Do any medications need mixed or dated? No   Copay form of payment Credit card on file   Additional comments $10 copay with copay card-[employer]   Questions or concerns for the pharmacist? No   Explain any questions or concerns for the pharmacist N/A   Are any medications first time fills? No        Eliquis delivery coordinated with pt for 4/21/2023 to her home address. $10 copay with copay card-[employer]. Her last delivery was on 3/24/2023. No questions or concerns to report to MTM Team today.     Follow-up: 21 day(s)     Angélica CRAVENHugh  Yogi  Specialty Pharmacy Technician

## 2023-05-10 ENCOUNTER — OFFICE VISIT (OUTPATIENT)
Dept: ONCOLOGY | Facility: CLINIC | Age: 53
End: 2023-05-10
Payer: COMMERCIAL

## 2023-05-10 ENCOUNTER — SPECIALTY PHARMACY (OUTPATIENT)
Dept: ONCOLOGY | Facility: HOSPITAL | Age: 53
End: 2023-05-10
Payer: COMMERCIAL

## 2023-05-10 ENCOUNTER — LAB (OUTPATIENT)
Dept: LAB | Facility: HOSPITAL | Age: 53
End: 2023-05-10
Payer: COMMERCIAL

## 2023-05-10 VITALS
HEART RATE: 78 BPM | TEMPERATURE: 97.3 F | SYSTOLIC BLOOD PRESSURE: 120 MMHG | HEIGHT: 58 IN | DIASTOLIC BLOOD PRESSURE: 78 MMHG | BODY MASS INDEX: 28.22 KG/M2 | OXYGEN SATURATION: 98 %

## 2023-05-10 VITALS — TEMPERATURE: 97.3 F

## 2023-05-10 DIAGNOSIS — Z79.899 HIGH RISK MEDICATION USE: ICD-10-CM

## 2023-05-10 DIAGNOSIS — D68.51 FACTOR 5 LEIDEN MUTATION, HETEROZYGOUS: Primary | ICD-10-CM

## 2023-05-10 DIAGNOSIS — Z79.01 CHRONIC ANTICOAGULATION: ICD-10-CM

## 2023-05-10 DIAGNOSIS — D50.0 IRON DEFICIENCY ANEMIA DUE TO CHRONIC BLOOD LOSS: ICD-10-CM

## 2023-05-10 DIAGNOSIS — I82.4Y3 DVT, LOWER EXTREMITY, PROXIMAL, ACUTE, BILATERAL: ICD-10-CM

## 2023-05-10 DIAGNOSIS — D68.51 FACTOR 5 LEIDEN MUTATION, HETEROZYGOUS: ICD-10-CM

## 2023-05-10 LAB
ALBUMIN SERPL-MCNC: 4.5 G/DL (ref 3.5–5.2)
ALBUMIN/GLOB SERPL: 1.4 G/DL (ref 1.1–2.4)
ALP SERPL-CCNC: 131 U/L (ref 38–116)
ALT SERPL W P-5'-P-CCNC: 23 U/L (ref 0–33)
ANION GAP SERPL CALCULATED.3IONS-SCNC: 13.2 MMOL/L (ref 5–15)
AST SERPL-CCNC: 15 U/L (ref 0–32)
BASOPHILS # BLD AUTO: 0.03 10*3/MM3 (ref 0–0.2)
BASOPHILS NFR BLD AUTO: 0.4 % (ref 0–1.5)
BILIRUB SERPL-MCNC: 0.6 MG/DL (ref 0.2–1.2)
BUN SERPL-MCNC: 13 MG/DL (ref 6–20)
BUN/CREAT SERPL: 19.7 (ref 7.3–30)
CALCIUM SPEC-SCNC: 10.3 MG/DL (ref 8.5–10.2)
CHLORIDE SERPL-SCNC: 104 MMOL/L (ref 98–107)
CO2 SERPL-SCNC: 25.8 MMOL/L (ref 22–29)
CREAT SERPL-MCNC: 0.66 MG/DL (ref 0.6–1.1)
DEPRECATED RDW RBC AUTO: 46 FL (ref 37–54)
EGFRCR SERPLBLD CKD-EPI 2021: 106.4 ML/MIN/1.73
EOSINOPHIL # BLD AUTO: 0.31 10*3/MM3 (ref 0–0.4)
EOSINOPHIL NFR BLD AUTO: 3.7 % (ref 0.3–6.2)
ERYTHROCYTE [DISTWIDTH] IN BLOOD BY AUTOMATED COUNT: 13 % (ref 12.3–15.4)
GLOBULIN UR ELPH-MCNC: 3.3 GM/DL (ref 1.8–3.5)
GLUCOSE SERPL-MCNC: 90 MG/DL (ref 74–124)
HCT VFR BLD AUTO: 46.9 % (ref 34–46.6)
HGB BLD-MCNC: 15 G/DL (ref 12–15.9)
IMM GRANULOCYTES # BLD AUTO: 0.02 10*3/MM3 (ref 0–0.05)
IMM GRANULOCYTES NFR BLD AUTO: 0.2 % (ref 0–0.5)
LYMPHOCYTES # BLD AUTO: 1.07 10*3/MM3 (ref 0.7–3.1)
LYMPHOCYTES NFR BLD AUTO: 12.8 % (ref 19.6–45.3)
MCH RBC QN AUTO: 30.6 PG (ref 26.6–33)
MCHC RBC AUTO-ENTMCNC: 32 G/DL (ref 31.5–35.7)
MCV RBC AUTO: 95.7 FL (ref 79–97)
MONOCYTES # BLD AUTO: 0.58 10*3/MM3 (ref 0.1–0.9)
MONOCYTES NFR BLD AUTO: 6.9 % (ref 5–12)
NEUTROPHILS NFR BLD AUTO: 6.34 10*3/MM3 (ref 1.7–7)
NEUTROPHILS NFR BLD AUTO: 76 % (ref 42.7–76)
NRBC BLD AUTO-RTO: 0 /100 WBC (ref 0–0.2)
PLATELET # BLD AUTO: 324 10*3/MM3 (ref 140–450)
PMV BLD AUTO: 10.8 FL (ref 6–12)
POTASSIUM SERPL-SCNC: 4.4 MMOL/L (ref 3.5–4.7)
PROT SERPL-MCNC: 7.8 G/DL (ref 6.3–8)
RBC # BLD AUTO: 4.9 10*6/MM3 (ref 3.77–5.28)
SODIUM SERPL-SCNC: 143 MMOL/L (ref 134–145)
WBC NRBC COR # BLD: 8.35 10*3/MM3 (ref 3.4–10.8)

## 2023-05-10 PROCEDURE — 80053 COMPREHEN METABOLIC PANEL: CPT

## 2023-05-10 PROCEDURE — 36415 COLL VENOUS BLD VENIPUNCTURE: CPT

## 2023-05-10 PROCEDURE — 85025 COMPLETE CBC W/AUTO DIFF WBC: CPT

## 2023-05-10 NOTE — PROGRESS NOTES
Specialty Pharmacy Patient Management Program  Oncology 6-Month Clinical Assessment       Angélica Lopez is a 52 y.o. female with factor V Leiden mutation on chronic anticoagulation seen today to assess adherence and side effects.    Regimen: Eliquis 2.5 mg po bid    Reason for Outreach: Routine medication check-in .       Problem list reviewed by Alicia Damon RPH on 5/10/2023 at  1:49 PM  Medicines reviewed by Alicia Damon RPH on 5/10/2023 at  1:49 PM  Allergies reviewed by Alicia Damon RPH on 5/10/2023 at  1:49 PM     Goals     • Specialty Pharmacy General Goal      Prevent blood clot          Medication Assessment:  • Medication Assessment  o Follow Up Clinical Assessment  o Medication(s) assessed: elijunior rome Therapeutic appropriateness: Appropriate  o Medication tolerability: Tolerating with no to minimal ADRs  o Medication plan: Continue therapy with normal follow-up  o Quality of Life Improvement Scale: No change  o Administration: Patient is taking every day at the same time  and twice daily.   o Patient can self administer medications: yes  o Medication Follow-Up Plan: Next clinical assessment  • Lab Review: The labs listed below have been reviewed. No dose adjustments are needed for the oral specialty medication(s) based on the labs.    Lab Results   Component Value Date    GLUCOSE 113 07/14/2021    CALCIUM 9.3 07/14/2021     07/14/2021    K 3.8 07/14/2021    CO2 28.5 07/14/2021     07/14/2021    BUN 4 (L) 07/14/2021    CREATININE 0.50 (L) 07/14/2021    EGFRIFNONA 131 07/14/2021    BCR 8.0 07/14/2021    ANIONGAP 10.5 07/14/2021     Lab Results   Component Value Date    WBC 8.35 05/10/2023    RBC 4.90 05/10/2023    HGB 15.0 05/10/2023    HCT 46.9 (H) 05/10/2023    MCV 95.7 05/10/2023    MCH 30.6 05/10/2023    MCHC 32.0 05/10/2023    RDW 13.0 05/10/2023    RDWSD 46.0 05/10/2023    MPV 10.8 05/10/2023     05/10/2023    NEUTRORELPCT 76.0 05/10/2023    LYMPHORELPCT 12.8 (L)  05/10/2023    MONORELPCT 6.9 05/10/2023    EOSRELPCT 3.7 05/10/2023    BASORELPCT 0.4 05/10/2023    AUTOIGPER 0.2 05/10/2023    NEUTROABS 6.34 05/10/2023    LYMPHSABS 1.07 05/10/2023    MONOSABS 0.58 05/10/2023    EOSABS 0.31 05/10/2023    BASOSABS 0.03 05/10/2023    AUTOIGNUM 0.02 05/10/2023    NRBC 0.0 05/10/2023     • Drug-drug interactions  o Completed medication reconciliation today to assess for drug interactions. Patient denies starting or stopping any medications.    o Assessed medication list for interactions, no significant drug interactions noted.   o Advised patient to call the clinic if any new medications are started so we can assess for drug-drug interactions.  • Drug-food interactions discussed: NA  • Vaccines are coordinated by the patient's oncologist and primary care provider.    Allergies  Known allergies and reactions were discussed with the patient. The patient's chart has been reviewed for allergy information and updated as necessary.   Allergies   Allergen Reactions   • Hydrolyzed Silk Hives, Itching, Palpitations and Shortness Of Breath        Hospitalizations and Urgent Care Visits Since Last Assessment:  • Unplanned hospitalizations or inpatient admissions: no  • ED Visits: no  • Urgent Office Visits: no    Adherence Assessment:  Adherence Questions  Medication(s) assessed: eliquis  On average, how many doses/injections does the patient miss per month?: 0  What are the identified reasons for non-adherence or missed doses? : no problems identfied  What is the estimated medication adherence level?: %  Based on the patient/caregiver response and refill history, does this patient require an MTP to track adherence improvements?: no    Quality of Life Assessment:  Quality of Life Assessment  Quality of Life Improvement Scale: No change  -- Quality of Life: 7/10    Financial Assessment:  Medication availability/affordability: Patient has had no issues obtaining medication from  pharmacy.      All questions addressed and patient had no additional concerns. Patient has pharmacy contact information.    Name/Credentials: Alicia Damon PharmD, BCPS    5/10/2023  13:54 EDT

## 2023-05-10 NOTE — PROGRESS NOTES
Subjective   DVT LE DUE TO IMMOBILITY PARALYZED LLE AND MINIMAL MOBILITY R LE DUE TO SPINA BIFIDA, HETEROZYGOUS FACTOR V LEIDEN MUTATION          History of Present Illness      On 05/10/2023 this 52-year-old female returns today to the office for follow up of her chronic anticoagulation in the background of extensive DVT, pulmonary embolism and factor V Leiden mutation. In the interim she has continued her anticoagulants with no clinical bleeding and no new thrombosis. Fortunately she has not had any trauma in spite of so many mobility issues associated with her spina bifida. The patient has not had mammograms in a good while. She denies any GI issues of any nature, no passage of blood in the stools, normal urination. She continues supporting herself in her endeavors in a motorized wheelchair and able to drive her car and get around, go to work and function like a normal person.          HEMATOLOGIC HISTORY:  delightful 50-year-old female who has history of spina bifida. She was born and she has had surgeries for this having paralysis of her left lower extremity and partial movement of her right lower extremity. She gets around on crutches. In any event she has noticed significant degree of swelling in the right lower extremity for the last week or so and she developed some pain and she became concerned about this. She was brought to the emergency room by her family and a Doppler documented a deep vein thrombosis. The patient has been receiving anticoagulants since the admission. She has not had any chest pain, cough, sputum production, shortness of breath, pleuritic pain, hemoptysis. Her appetite has remained good, her weight is stable, her bowel function and urination have remained normal. She has not had any vaginal bleeding. 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 Again the patient gets around with crutches that push her upper body to get around and  she uses the minimal mobility of her right leg to propel herself around the situation.      She has no previous history of thrombophilia. She is not taking any hormone replacement therapy or using any anabolic steroids.      She has not had any recent trauma, no recent traveling anywhere.     The patient was further reviewed in the office on 01/17/2021.  She has noticed some fatigue and she was having significant GYN bleeding in the form of metrorrhagia.  This required a dose adjustment of her Eliquis to 2.5 mg twice a day.  She was advised to come and see us and we have documented today very significant anemia and her hemoglobin was 7.5.  The patient states that the bleeding in the  tract was abundant and continues.  She denies any melena, metrorrhagia or hematuria.  She has fatigue.  She is tired.  She is able to function.  Her work is now done from home.  Her appetite and weight are stable.  No cardiovascular issues.  The spina bifida and all implications of this remain unchanged, requiring crutches to mobilize herself from the house to get to the car to our building.        Past Medical History:   Diagnosis Date   • Bowel and bladder incontinence    • Constipation    • DVT (deep venous thrombosis) 2012 2020    BOTH LEGS   • Fibroids     UTERINE   • Heavy periods    • History of anemia    • Muscle weakness     IN LEGS WEARS LEG BRACES   • Paralysis of left lower extremity     with minimal mobility to RLE   • Spina bifida      Past Surgical History:   Procedure Laterality Date   • LEG SURGERY      MULTIPLE LEG SURGERIES FOR SPINA BIFIDA   • SPINE SURGERY  1971    childhood   • TOTAL ABDOMINAL HYSTERECTOMY N/A 6/23/2021    Procedure: TOTAL ABDOMINAL HYSTERECTOMY, BILATERAL SALPINGECTOMY, RIGHT OOPHRECTOMY, LYSIS OF ADHESIONS ;  Surgeon: Emani Chapa MD;  Location: [facility] OR [facility] [place];  Service: Obstetrics/Gynecology;  Laterality: N/A;   • UTERINE FIBROID SURGERY  2012   •  SHUNT INSERTION  1971 1973 1976  "    Family History   Problem Relation Age of Onset   • Malig Hyperthermia Neg Hx       Social History     Socioeconomic History   • Marital status: Single   Tobacco Use   • Smoking status: Never   • Smokeless tobacco: Never   Substance and Sexual Activity   • Alcohol use: Not Currently     Comment: occasionally   • Drug use: Never   • Sexual activity: Defer     Current Outpatient Medications on File Prior to Visit   Medication Sig Dispense Refill   • apixaban (ELIQUIS) 2.5 MG tablet tablet Take 1 tablet by mouth 2 (Two) Times a Day. 60 tablet 5   • Cholecalciferol (D-5000) 125 MCG (5000 UT) tablet Take 1 tablet by mouth Daily. 30 tablet 0   • Prenatal MV-Min-Fe Fum-FA-DHA (PRENATAL 1 PO) Take 1 tablet by mouth Every Other Day.     • sennosides-docusate (PERICOLACE) 8.6-50 MG per tablet Take 1 tablet by mouth Every Morning.       No current facility-administered medications on file prior to visit.       ALLERGIES:    Allergies   Allergen Reactions   • Hydrolyzed Silk Hives, Itching, Palpitations and Shortness Of Breath       Objective      Vitals:    05/10/23 1331   BP: 120/78   Pulse: 78   Temp: 97.3 °F (36.3 °C)   TempSrc: Temporal   SpO2: 98%   Weight: Comment: Patient did not wish to get on the scale   Height: 147.3 cm (57.99\")   PainSc: 0-No pain         5/10/2023     1:32 PM   Current Status   ECOG score 0     EXAM :        GENERAL:  Well-developed, Patient  in no acute distress. MOTORIZED WHEEL CHAIR  SKIN:  Warm, dry ,NO purpura ,no rash.  HEENT:  Pupils were equal and reactive to light and accomodation, conjunctivae noninjected,  normal visual acuity.   NECK:  Supple with good range of motion; no thyromegaly , no JVD or bruits,.No carotid artery pain, no carotid abnormal pulsation   LYMPHATICS:  No cervical, NO supraclavicular, NO axillary, NO inguinal adenopathies.  CARDIAC   normal rate , regular rhythm, without murmur,NO rubs NO S3 NO S4   LUNGS: normal breath sounds bilateral, no wheezing, NO rhonchi, NO " crackles ,NO rubs.  VASCULAR VENOUS: no cyanosis, NO collateral circulation, NO varicosities, NO edema, NO palpable cords, NO pain,NO erythema, NO pigmentation of the skin.  ABDOMEN:  Soft, NO pain,no hepatomegaly, no splenomegaly,no masses, no ascites, no collateral circulation,no distention.  EXTREMITIES  AND SPINE:  No clubbing, no cyanosis ,no deformities , no pain .No kyphosis,  no pain in spine, no pain in ribs , no pain in pelvic bone.  NEUROLOGICAL:  Patient was awake, alert, oriented to time, person and place.PARALYSIS OF LE            RECENT LABS:  Hematology WBC   Date Value Ref Range Status   05/10/2023 8.35 3.40 - 10.80 10*3/mm3 Final     RBC   Date Value Ref Range Status   05/10/2023 4.90 3.77 - 5.28 10*6/mm3 Final     Hemoglobin   Date Value Ref Range Status   05/10/2023 15.0 12.0 - 15.9 g/dL Final     Hematocrit   Date Value Ref Range Status   05/10/2023 46.9 (H) 34.0 - 46.6 % Final     Platelets   Date Value Ref Range Status   05/10/2023 324 140 - 450 10*3/mm3 Final                    Assessment & Plan    This patient has had thrombophilia in the past when I saw her in consultation at [facility] in August of 2020 when she presented with a deep vein thrombosis. This was very extensive. She was placed on anticoagulants and she was discharged. Thrombophilia labs were documented including the fact that she is factor V Leiden positive, heterozygous, prothrombin gene mutation negative and the rest of the thrombophilia labs were negative. The patient has remained anticoagulated since then. She lacked insurance all through 2020 but now she has insurance and she was willing to come and see us. Today we have documented that the patient has tremendous degree of metrorrhagia recently and this triggered very significant anemia with a hemoglobin of 7.5 . Her MCV is low. She denies any GI bleeding. Her diet is appropriate. Her weight is stable. She has no abdominal pain and no pelvic pain.  Immobility due to her spina bifida remains ongoing and she mobilizes herself around the house with crutches, here with a small cart.     I gave her the report of her heterozygous factor V Leiden mutation and I explained to her that she needs to discuss with family members on both sides of her family, father and mother who are alive, the need for them to be tested for this. That is very crucial and I asked her to explain to them why it is important.      I advised her that this is a genetic condition that will remain with her for the rest of her life and given her immobility and spina bifida situation is not going to change, I think that she needs to remain on anticoagulants for the rest of her life as well.     I directed also my advice for treatment of her anemia. She is taking a prenatal vitamin and I pointed out to her that she probably needs to take 2 of them a day to see if we can build up her hemoglobin better now that her dose of Eliquis has been adjusted down and see if she bleeds less. I would like for her to return to see us and a nurse and a blood count and a reticulated hemoglobin in 1 month and I would like to review her back in 2 months. If anemia is not corrected with simple methodology, I think she will require IV iron therapy.      I also suggested dietetic changes including eating some red meat and also leafy things like kale, maria luisa greens, spinach, almonds and oats which are rich in iron as well. She does not like liver or things of this nature.      I asked her also if she continues bleeding in the genitourinary tract to give us a call and she will require to be seen by gynecologist. In the past she has had a fibroid surgery and maybe she will require at some point abdominal and pelvis CT scans.     The other issue that I discussed with her is the need for her eventually to be vaccinated for Covid and I asked her to proceed with this as well whenever this becomes available. I find no formal  contraindication for her for this.     I did a breast examination today that is normal and this was performed because on the CT scan of the chest while in the hospital documented breast nodularity. Now that the patient has insurance her primary physician will proceed with mammograms. I agree with this completely.    The patient was further reviewed on 07/14/2021 after I have advised her before to proceed with a hysterectomy. We modified her dose of anticoagulant for this to happen and then she has recovered. Actually today her hemoglobin after hysterectomy is 11.7, and is very good. Did not change too much, before the hysterectomy was 12. The patient remains on her anticoagulant, Eliquis. No clinical bleeding and no new vein thrombosis in her lower extremities.     Her ferritin is pending. Her reticulated hemoglobin is 30. The patient remains on oral ferrous sulfate, prenatal vitamin 3 times a week and I advised her to remain on this medicine for the time being.     From the point of view of her anticoagulation, given her previous history of factor V Leiden heterozygosity and previous extensive DVT, I advised her to remain on the Eliquis for the time being. Because of her paralysis due to spina bifida, she needs to transfer herself by crutches from her electrical transporter device into the car and I advised her again to be extremely careful in these transfers.     I advised her to return to see me back in 3 months to monitor her CBC, hemoglobin and reticulated hemoglobin, ferritin, iron profile.     The numbers obtained today will be called to her once that they become available.     Otherwise, no other intervention from my point of view.  The patient was further reviewed on 10/13/2021. Since the previous visit the anemia has been completely corrected because the hysterectomy fixed the problem of chronic genitourinary blood loss. The hemoglobin today is up to 14 g. The patient is still taking prenatal vitamins and  I asked her to take 1 of them only once a week.     Amazes me how strong she is given her comorbidities and all her problems, living on her own, solving the problems of life on her own and living a life that is just worth it given the strength of her mental and physical ability to survive. This is enough to just put a show with her and be able to teach people how to live life.      The patient was further reviewed on 05/25/2022. Since the previous visit she has continued her anticoagulant with no difficulty with the consecution of the medicine. She has not had any clinical bleeding, no new thrombosis. Her white count, hemoglobin and platelets are normal today.   Patient returns 11/9/2022 in follow-up continuing on Eliquis 2.5 mg twice daily.  She denies bleeding.  She has had no recent dark stools.  She will continue on Eliquis 2.5 mg twice daily.        On 05/10/2023 this patient returns to the office in her motorized wheelchair in order to be reviewed with no clinical bleeding and no new thrombosis. She takes a reduced dose of Eliquis, she has not had any epistaxis, gingival bleeding or bleeding in her lower extremities. She has not had any fall or trauma associated with her immobility and associated with her neurological disability.     Her white count, hemoglobin and platelets are normal. In the past she was very anemic due to genitourinary blood loss, after hysterectomy this corrected completely. The patient has not had screening mammogram in a good while and I went ahead and scheduled her for this.    I went ahead and advised the patient to continue taking her Eliquis 2.5 mg twice a day, protect herself from trauma in the best way possible and return to see us back in 6 months. She will have a CBC and CMP at that time.     This patient takes high risk medication, requires monitoring for toxicity.      ·               5/10/2023      CC:

## 2023-05-11 ENCOUNTER — TELEPHONE (OUTPATIENT)
Dept: ONCOLOGY | Facility: CLINIC | Age: 53
End: 2023-05-11
Payer: COMMERCIAL

## 2023-05-11 DIAGNOSIS — E83.52 HYPERCALCEMIA: Primary | ICD-10-CM

## 2023-05-11 NOTE — TELEPHONE ENCOUNTER
Called patient to relay message. No answer. Left voicemail with direct line [phone] and instructions to call back. Orders placed and message sent to scheduling. Nely Wagner RN

## 2023-05-11 NOTE — TELEPHONE ENCOUNTER
----- Message from Jerald Carrasquillo MD sent at 5/11/2023 11:12 AM EDT -----  Call her calcium is high repeat cmp in one week, stop taking calcium supplement  and vit d

## 2023-05-11 NOTE — TELEPHONE ENCOUNTER
----- Message from Nely Wagner RN sent at 5/11/2023 12:57 PM EDT -----  Please bring in for lab appt next week. I tried to call. No answer. Left a voiemail if you are able to get a hold of her would you send her back to my phone? Thanks!  ----- Message -----  From: Jerald Carrasquillo MD  Sent: 5/11/2023  11:13 AM EDT  To: Nely Wagner RN    Call her calcium is high repeat cmp in one week, stop taking calcium supplement  and vit d

## 2023-05-12 ENCOUNTER — TELEPHONE (OUTPATIENT)
Dept: ONCOLOGY | Facility: CLINIC | Age: 53
End: 2023-05-12
Payer: COMMERCIAL

## 2023-05-15 ENCOUNTER — SPECIALTY PHARMACY (OUTPATIENT)
Dept: PHARMACY | Facility: HOSPITAL | Age: 53
End: 2023-05-15
Payer: COMMERCIAL

## 2023-05-15 ENCOUNTER — TELEPHONE (OUTPATIENT)
Dept: ONCOLOGY | Facility: CLINIC | Age: 53
End: 2023-05-15
Payer: COMMERCIAL

## 2023-05-15 DIAGNOSIS — E83.52 HYPERCALCEMIA: Primary | ICD-10-CM

## 2023-05-15 NOTE — PROGRESS NOTES
Specialty Pharmacy Refill Coordination Note     Angélica is a 52 y.o. female contacted today regarding refills of Eliquis medication(s).    Reviewed and verified with patient:       Specialty medication(s) and dose(s) confirmed: yes  Eliquis 2.5 mg twice per day    Refill Questions    Flowsheet Row Most Recent Value   Changes to allergies? No   Changes to medications? Yes  [Pt is to stop Calcium and Vitamin D]   New conditions since last clinic visit No   Unplanned office visit, urgent care, ED, or hospital admission in the last 4 weeks  No   How does patient/caregiver feel medication is working? Good   Financial problems or insurance changes  No   If yes, describe changes in insurance or financial issues. N/A   Since the previous refill, were any specialty medication doses or scheduled injections missed or delayed?  No   Does this patient require a clinical escalation to a pharmacist? No          Delivery Questions    Flowsheet Row Most Recent Value   Delivery method Other (Comment)  [Ship to home address-Ship 5/17 for delivery 5/18-$10 [id number] Confirmed]   Delivery address correct? Yes  [Ship to home address]   Delivery phone number [phone]   Preferred delivery time? AM   Number of medications in delivery 1   Medication being filled and delivered Eliquis   Doses left of specialty medications 1 week   Is there any medication that is due not being filled? No   Supplies needed? No supplies needed   Cooler needed? No   Do any medications need mixed or dated? No   Copay form of payment Credit card on file   Additional comments $10 copay with copay card-[facility]   Questions or concerns for the pharmacist? No   Explain any questions or concerns for the pharmacist N/A   Are any medications first time fills? No          Eliquis delivery coordinated with pt for 5/18/2023 to her home address. $10 copay-[facility]OF. Her last delivery was on 4/21/2023. No questions or conerns to report to MTM Team today.       Follow-up: 21  rola(s)     Angélica Calix  Specialty Pharmacy Technician

## 2023-05-15 NOTE — TELEPHONE ENCOUNTER
Called patient and relayed message. Pt v/u. Updated med list accordingly. Placed lab orders. Message sent to scheduling. Nely Wagner RN

## 2023-05-15 NOTE — TELEPHONE ENCOUNTER
----- Message from Nely Wagner RN sent at 5/12/2023 11:26 AM EDT -----    ----- Message -----  From: Nely Wagner RN  Sent: 5/12/2023   8:00 AM EDT  To: Nely Wagner RN      ----- Message -----  From: Jerald Carrasquillo MD  Sent: 5/11/2023  11:13 AM EDT  To: Nely Wagner RN    Call her calcium is high repeat cmp in one week, stop taking calcium supplement  and vit d

## 2023-08-14 ENCOUNTER — SPECIALTY PHARMACY (OUTPATIENT)
Dept: PHARMACY | Facility: HOSPITAL | Age: 53
End: 2023-08-14
Payer: COMMERCIAL

## 2023-08-14 NOTE — PROGRESS NOTES
Specialty Pharmacy Refill Coordination Note     Angélica is a 53 y.o. female contacted today regarding refills of Eliquis specialty medication(s).    Reviewed and verified with patient:       Specialty medication(s) and dose(s) confirmed: yes  Eliquis 2.5 mg twice per day    Refill Questions      Flowsheet Row Most Recent Value   Changes to allergies? No   Changes to medications? No   New conditions since last clinic visit No   Unplanned office visit, urgent care, ED, or hospital admission in the last 4 weeks  No   How does patient/caregiver feel medication is working? Good   Financial problems or insurance changes  No   If yes, describe changes in insurance or financial issues. N/A   Since the previous refill, were any specialty medication doses or scheduled injections missed or delayed?  No   Does this patient require a clinical escalation to a pharmacist? No            Delivery Questions      Flowsheet Row Most Recent Value   Delivery method Other (Comment)  [Ship to home address-Ship 8/16 for delivery 8/17-$10 [id number] Confirmed]   Delivery address correct? Yes  [Ship to home address]   Delivery phone number [phone]   Preferred delivery time? AM   Number of medications in delivery 1   Medication being filled and delivered Eliquis   Doses left of specialty medications 10 days   Is there any medication that is due not being filled? No   Supplies needed? No supplies needed   Cooler needed? No   Do any medications need mixed or dated? No   Copay form of payment Credit card on file   Additional comments $10 copay with copay card-[facility]   Questions or concerns for the pharmacist? No   Explain any questions or concerns for the pharmacist N/A   Are any medications first time fills? No          Eliquis delivery coordinated with pt for 8/17/2023 to her home address. $10 copay with the copay card-[facility]. Her last delivery was on 7/21/2023. No questions or concerns to report to MTM Team today.     Follow-up: 21  day(s)     Angélica Calix, Pharmacy Technician  Specialty Pharmacy Technician

## 2023-09-19 ENCOUNTER — SPECIALTY PHARMACY (OUTPATIENT)
Dept: PHARMACY | Facility: HOSPITAL | Age: 53
End: 2023-09-19
Payer: COMMERCIAL

## 2023-09-19 NOTE — PROGRESS NOTES
Specialty Pharmacy Refill Coordination Note     Angélica is a 53 y.o. female contacted today regarding refills of Eliquis specialty medication(s).    Reviewed and verified with patient:       Specialty medication(s) and dose(s) confirmed: yes  Eliquis 2.5 mg twice per day    Refill Questions      Flowsheet Row Most Recent Value   Changes to allergies? No   Changes to medications? No   New conditions since last clinic visit No   Unplanned office visit, urgent care, ED, or hospital admission in the last 4 weeks  No   How does patient/caregiver feel medication is working? Good   Financial problems or insurance changes  No   If yes, describe changes in insurance or financial issues. N/A   Since the previous refill, were any specialty medication doses or scheduled injections missed or delayed?  No   Does this patient require a clinical escalation to a pharmacist? No            Delivery Questions      Flowsheet Row Most Recent Value   Delivery method Other (Comment)  [Ship to home address-Ship 9/20 for delivery 9/21-$10 [id number] Confirmed]   Delivery address correct? Yes  [Ship to home address]   Delivery phone number [phone]   Preferred delivery time? AM   Number of medications in delivery 1   Medication being filled and delivered Eliquis   Doses left of specialty medications 4-5 days   Is there any medication that is due not being filled? No   Supplies needed? No supplies needed   Cooler needed? No   Do any medications need mixed or dated? No   Copay form of payment Credit card on file   Additional comments $10 copay with copay card-[facility]   Questions or concerns for the pharmacist? No   Explain any questions or concerns for the pharmacist N/A   Are any medications first time fills? No          Eliquis delivery coordinated with pt for 9/21/2023 to her home address. $10 copay-CCOF. Her last delivery was on 8/17/2023. No questions or concerns to report to MTM Team today.     Follow-up: 21 day(s)     Angélica CRAVENHugh  Yogi, Pharmacy Technician  Specialty Pharmacy Technician

## 2023-10-16 ENCOUNTER — SPECIALTY PHARMACY (OUTPATIENT)
Dept: PHARMACY | Facility: HOSPITAL | Age: 53
End: 2023-10-16
Payer: COMMERCIAL

## 2023-10-16 NOTE — PROGRESS NOTES
Per protocol   Specialty Pharmacy Refill Coordination Note     Angélica is a 53 y.o. female contacted today regarding refills of Eliquis medication(s).    Reviewed and verified with patient:       Specialty medication(s) and dose(s) confirmed: yes  Eliquis 2.5 mg twice per day    Refill Questions      Flowsheet Row Most Recent Value   Changes to allergies? No   Changes to medications? No   New conditions since last clinic visit No   Unplanned office visit, urgent care, ED, or hospital admission in the last 4 weeks  No   How does patient/caregiver feel medication is working? Good   Financial problems or insurance changes  No   If yes, describe changes in insurance or financial issues. N/A   Since the previous refill, were any specialty medication doses or scheduled injections missed or delayed?  No   Does this patient require a clinical escalation to a pharmacist? No            Delivery Questions      Flowsheet Row Most Recent Value   Delivery method Other (Comment)  [Ship to home address-Ship 10/17 for delivery 10/18-$10 [id number] Confirmed]   Delivery address correct? Yes  [Ship to home address]   Delivery phone number [phone]   Preferred delivery time? AM   Number of medications in delivery 1   Medication(s) being filled and delivered Apixaban   Doses left of specialty medications 7 days   Is there any medication that is due not being filled? No   Supplies needed? No supplies needed   Cooler needed? No   Do any medications need mixed or dated? No   Copay form of payment Credit card on file   Additional comments $10 copay with copay card-[facility]   Questions or concerns for the pharmacist? No   Explain any questions or concerns for the pharmacist N/A   Are any medications first time fills? No          Eliquis delivery coordinated with pt for 10/18/23 to her home address. $10 copay-CCOF. Her last delivery was on 9/21/2023. No questions or concerns to report to MTM Team today.     Follow-up: 21 day(s)     Angélica CRAVENHugh  Yogi, Pharmacy Technician  Specialty Pharmacy Technician

## 2023-10-16 NOTE — TELEPHONE ENCOUNTER
Refill requested from pharmacy. Per last chart note, patient to continue Eliquis 2.5 mg twice a day. Will route to MD for cosignature.    Emmett Taylor, PharmD, BCOP

## 2023-11-08 ENCOUNTER — SPECIALTY PHARMACY (OUTPATIENT)
Dept: ONCOLOGY | Facility: HOSPITAL | Age: 53
End: 2023-11-08
Payer: COMMERCIAL

## 2023-11-08 ENCOUNTER — LAB (OUTPATIENT)
Dept: LAB | Facility: HOSPITAL | Age: 53
End: 2023-11-08
Payer: COMMERCIAL

## 2023-11-08 ENCOUNTER — OFFICE VISIT (OUTPATIENT)
Dept: ONCOLOGY | Facility: CLINIC | Age: 53
End: 2023-11-08
Payer: COMMERCIAL

## 2023-11-08 VITALS
SYSTOLIC BLOOD PRESSURE: 149 MMHG | TEMPERATURE: 98.1 F | HEART RATE: 82 BPM | DIASTOLIC BLOOD PRESSURE: 96 MMHG | OXYGEN SATURATION: 99 %

## 2023-11-08 VITALS — TEMPERATURE: 99.1 F

## 2023-11-08 DIAGNOSIS — D68.51 FACTOR 5 LEIDEN MUTATION, HETEROZYGOUS: Primary | ICD-10-CM

## 2023-11-08 DIAGNOSIS — Z79.899 HIGH RISK MEDICATION USE: ICD-10-CM

## 2023-11-08 DIAGNOSIS — Z79.01 CHRONIC ANTICOAGULATION: ICD-10-CM

## 2023-11-08 DIAGNOSIS — D50.0 IRON DEFICIENCY ANEMIA DUE TO CHRONIC BLOOD LOSS: ICD-10-CM

## 2023-11-08 DIAGNOSIS — D68.51 FACTOR 5 LEIDEN MUTATION, HETEROZYGOUS: ICD-10-CM

## 2023-11-08 LAB
ALBUMIN SERPL-MCNC: 4.2 G/DL (ref 3.5–5.2)
ALBUMIN/GLOB SERPL: 1.5 G/DL
ALP SERPL-CCNC: 146 U/L (ref 39–117)
ALT SERPL W P-5'-P-CCNC: 27 U/L (ref 1–33)
ANION GAP SERPL CALCULATED.3IONS-SCNC: 16.8 MMOL/L (ref 5–15)
AST SERPL-CCNC: 24 U/L (ref 1–32)
BASOPHILS # BLD AUTO: 0.05 10*3/MM3 (ref 0–0.2)
BASOPHILS NFR BLD AUTO: 0.6 % (ref 0–1.5)
BILIRUB SERPL-MCNC: 1.2 MG/DL (ref 0–1.2)
BUN SERPL-MCNC: 6 MG/DL (ref 6–20)
BUN/CREAT SERPL: 9.7 (ref 7–25)
CALCIUM SPEC-SCNC: 9.1 MG/DL (ref 8.6–10.5)
CHLORIDE SERPL-SCNC: 102 MMOL/L (ref 98–107)
CO2 SERPL-SCNC: 23.2 MMOL/L (ref 22–29)
CREAT SERPL-MCNC: 0.62 MG/DL (ref 0.6–1.1)
DEPRECATED RDW RBC AUTO: 49.3 FL (ref 37–54)
EGFRCR SERPLBLD CKD-EPI 2021: 107.3 ML/MIN/1.73
EOSINOPHIL # BLD AUTO: 0.31 10*3/MM3 (ref 0–0.4)
EOSINOPHIL NFR BLD AUTO: 3.8 % (ref 0.3–6.2)
ERYTHROCYTE [DISTWIDTH] IN BLOOD BY AUTOMATED COUNT: 14.2 % (ref 12.3–15.4)
GLOBULIN UR ELPH-MCNC: 2.8 GM/DL
GLUCOSE SERPL-MCNC: 90 MG/DL (ref 65–99)
HCT VFR BLD AUTO: 46.3 % (ref 34–46.6)
HGB BLD-MCNC: 15.4 G/DL (ref 12–15.9)
IMM GRANULOCYTES # BLD AUTO: 0.03 10*3/MM3 (ref 0–0.05)
IMM GRANULOCYTES NFR BLD AUTO: 0.4 % (ref 0–0.5)
LYMPHOCYTES # BLD AUTO: 1.2 10*3/MM3 (ref 0.7–3.1)
LYMPHOCYTES NFR BLD AUTO: 14.8 % (ref 19.6–45.3)
MCH RBC QN AUTO: 31.9 PG (ref 26.6–33)
MCHC RBC AUTO-ENTMCNC: 33.3 G/DL (ref 31.5–35.7)
MCV RBC AUTO: 95.9 FL (ref 79–97)
MONOCYTES # BLD AUTO: 0.55 10*3/MM3 (ref 0.1–0.9)
MONOCYTES NFR BLD AUTO: 6.8 % (ref 5–12)
NEUTROPHILS NFR BLD AUTO: 5.96 10*3/MM3 (ref 1.7–7)
NEUTROPHILS NFR BLD AUTO: 73.6 % (ref 42.7–76)
NRBC BLD AUTO-RTO: 0 /100 WBC (ref 0–0.2)
PLATELET # BLD AUTO: 323 10*3/MM3 (ref 140–450)
PMV BLD AUTO: 9.3 FL (ref 6–12)
POTASSIUM SERPL-SCNC: 3.3 MMOL/L (ref 3.5–5.2)
PROT SERPL-MCNC: 7 G/DL (ref 6–8.5)
RBC # BLD AUTO: 4.83 10*6/MM3 (ref 3.77–5.28)
SODIUM SERPL-SCNC: 142 MMOL/L (ref 136–145)
WBC NRBC COR # BLD: 8.1 10*3/MM3 (ref 3.4–10.8)

## 2023-11-08 PROCEDURE — 80053 COMPREHEN METABOLIC PANEL: CPT

## 2023-11-08 PROCEDURE — 85025 COMPLETE CBC W/AUTO DIFF WBC: CPT

## 2023-11-08 PROCEDURE — 36415 COLL VENOUS BLD VENIPUNCTURE: CPT

## 2023-11-08 NOTE — PROGRESS NOTES
Specialty Pharmacy Patient Management Program  Oncology 6-Month Clinical Assessment       Angélica Lopez is a 53 y.o. female with factor V Leiden seen today to assess adherence and side effects.    Regimen: Eliquis 2.5 mg twice daily    Reason for Outreach: Routine medication check-in .       Problem list reviewed by Dominga Taylor RPH on 11/8/2023 at  3:21 PM  Medicines reviewed by Dominga Taylor RPH on 11/8/2023 at  3:21 PM  Allergies reviewed by Dominga Taylor RPH on 11/8/2023 at  3:21 PM     Goals Addressed Today        Specialty Pharmacy General Goal      Prevent blood clot            Medication Assessment:  Medication Assessment  Follow Up Clinical Assessment  Linked Medication(s) Assessed: Apixaban  Therapeutic appropriateness: Appropriate  Medication tolerability: Tolerating with no to minimal ADRs  Medication plan: Continue therapy with normal follow-up  Quality of Life Improvement Scale: No change  Administration: Patient is taking every day at the same time , twice daily, and as prescribed .   Patient can self administer medications: yes  Medication Follow-Up Plan: Next clinical assessment  Lab Review: The labs listed below have been reviewed. No dose adjustments are needed for the oral specialty medication(s) based on the labs.    Lab Results   Component Value Date    GLUCOSE 90 05/10/2023    CALCIUM 10.3 (H) 05/10/2023     05/10/2023    K 4.4 05/10/2023    CO2 25.8 05/10/2023     05/10/2023    BUN 13 05/10/2023    CREATININE 0.66 05/10/2023    EGFRIFNONA 131 07/14/2021    BCR 19.7 05/10/2023    ANIONGAP 13.2 05/10/2023     Lab Results   Component Value Date    WBC 8.10 11/08/2023    RBC 4.83 11/08/2023    HGB 15.4 11/08/2023    HCT 46.3 11/08/2023    MCV 95.9 11/08/2023    MCH 31.9 11/08/2023    MCHC 33.3 11/08/2023    RDW 14.2 11/08/2023    RDWSD 49.3 11/08/2023    MPV 9.3 11/08/2023     11/08/2023    NEUTRORELPCT 73.6 11/08/2023    LYMPHORELPCT 14.8 (L) 11/08/2023     MONORELPCT 6.8 11/08/2023    EOSRELPCT 3.8 11/08/2023    BASORELPCT 0.6 11/08/2023    AUTOIGPER 0.4 11/08/2023    NEUTROABS 5.96 11/08/2023    LYMPHSABS 1.20 11/08/2023    MONOSABS 0.55 11/08/2023    EOSABS 0.31 11/08/2023    BASOSABS 0.05 11/08/2023    AUTOIGNUM 0.03 11/08/2023    NRBC 0.0 11/08/2023     Drug-drug interactions  Completed medication reconciliation today to assess for drug interactions. Patient denies starting or stopping any medications.    Assessed medication list for interactions, no significant drug interactions noted.   Advised patient to call the clinic if any new medications are started so we can assess for drug-drug interactions.  Drug-food interactions discussed:  none  Vaccines are coordinated by the patient's oncologist and primary care provider.    Allergies  Known allergies and reactions were discussed with the patient. The patient's chart has been reviewed for allergy information and updated as necessary.   Allergies   Allergen Reactions    Hydrolyzed Silk Hives, Itching, Palpitations and Shortness Of Breath        Hospitalizations and Urgent Care Visits Since Last Assessment:  Unplanned hospitalizations or inpatient admissions: no  ED Visits: no  Urgent Office Visits: no    Adherence Assessment:  Adherence Questions  Linked Medication(s) Assessed: Apixaban  On average, how many doses/injections does the patient miss per month?: 0  What are the identified reasons for non-adherence or missed doses? : no problems identified  What is the estimated medication adherence level?: %  Based on the patient/caregiver response and refill history, does this patient require an MTP to track adherence improvements?: no    Quality of Life Assessment:  Quality of Life Assessment  Quality of Life Improvement Scale: No change  -- Quality of Life: 5/10    Financial Assessment:  Medication availability/affordability: Patient has had no issues obtaining medication from pharmacy.    Attestation     I  attest the patient was actively involved in and has agreed to the above plan of care.  If the prescribed therapy is at any point deemed not appropriate based on the current or future assessments, a consultation will be initiated with the patient's specialty care provider to determine the best course of action. The revised plan of therapy will be documented along with any required assessments and/or additional patient education provided.       All questions addressed and patient had no additional concerns. Patient has pharmacy contact information.    Name/Credentials: Emmett Taylor PharmD, [facility]  Clinical Oncology Pharmacist    11/8/2023  15:22 EST

## 2023-11-08 NOTE — PROGRESS NOTES
Subjective   DVT LE DUE TO IMMOBILITY PARALYZED LLE AND MINIMAL MOBILITY R LE DUE TO SPINA BIFIDA, HETEROZYGOUS FACTOR V LEIDEN MUTATION          History of Present Illness      On 11/08/2023, this 53-year-old female who has spina bifida and mobilizes herself on a scooter, who has history of heterozygosity for factor V Leiden mutation, previous DVT and pulmonary embolism returns for the office assessment in regard to her chronic anticoagulation. At this time the patient has not had any issues in regard to bleeding. No new thrombosis and no development of postphlebitic syndrome in her lower extremities. She continues working from home. Her appetite and bowel activity are normal. No cardiac or respiratory issues. No falls or trauma. No clinical bleeding.          HEMATOLOGIC HISTORY:  delightful 50-year-old female who has history of spina bifida. She was born and she has had surgeries for this having paralysis of her left lower extremity and partial movement of her right lower extremity. She gets around on crutches. In any event she has noticed significant degree of swelling in the right lower extremity for the last week or so and she developed some pain and she became concerned about this. She was brought to the emergency room by her family and a Doppler documented a deep vein thrombosis. The patient has been receiving anticoagulants since the admission. She has not had any chest pain, cough, sputum production, shortness of breath, pleuritic pain, hemoptysis. Her appetite has remained good, her weight is stable, her bowel function and urination have remained normal. She has not had any vaginal bleeding. 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 Again the patient gets around with crutches that push her upper body to get around and she uses the minimal mobility of her right leg to propel herself around the situation.      She has no previous history of  thrombophilia. She is not taking any hormone replacement therapy or using any anabolic steroids.      She has not had any recent trauma, no recent traveling anywhere.     The patient was further reviewed in the office on 01/17/2021.  She has noticed some fatigue and she was having significant GYN bleeding in the form of metrorrhagia.  This required a dose adjustment of her Eliquis to 2.5 mg twice a day.  She was advised to come and see us and we have documented today very significant anemia and her hemoglobin was 7.5.  The patient states that the bleeding in the  tract was abundant and continues.  She denies any melena, metrorrhagia or hematuria.  She has fatigue.  She is tired.  She is able to function.  Her work is now done from home.  Her appetite and weight are stable.  No cardiovascular issues.  The spina bifida and all implications of this remain unchanged, requiring crutches to mobilize herself from the house to get to the car to our building.        Past Medical History:   Diagnosis Date    Bowel and bladder incontinence     Constipation     DVT (deep venous thrombosis) 2012 2020    BOTH LEGS    Fibroids     UTERINE    Heavy periods     History of anemia     Muscle weakness     IN LEGS WEARS LEG BRACES    Paralysis of left lower extremity     with minimal mobility to RLE    Spina bifida      Past Surgical History:   Procedure Laterality Date    LEG SURGERY      MULTIPLE LEG SURGERIES FOR SPINA BIFIDA    SPINE SURGERY  1971    childhood    TOTAL ABDOMINAL HYSTERECTOMY N/A 6/23/2021    Procedure: TOTAL ABDOMINAL HYSTERECTOMY, BILATERAL SALPINGECTOMY, RIGHT OOPHRECTOMY, LYSIS OF ADHESIONS ;  Surgeon: Emani Chapa MD;  [place] [facility] [place] [facility] [place]  Service: Obstetrics/Gynecology;  Laterality: N/A;    UTERINE FIBROID SURGERY  2012     SHUNT INSERTION  1971 1973 1976     Family History   Problem Relation Age of Onset    Malig Hyperthermia Neg Hx       Social History     Socioeconomic History    Marital  status: Single   Tobacco Use    Smoking status: Never    Smokeless tobacco: Never   Substance and Sexual Activity    Alcohol use: Not Currently     Comment: occasionally    Drug use: Never    Sexual activity: Defer     Current Outpatient Medications on File Prior to Visit   Medication Sig Dispense Refill    apixaban (ELIQUIS) 2.5 MG tablet tablet Take 1 tablet by mouth 2 (Two) Times a Day. 60 tablet 5    Cholecalciferol (D-5000) 125 MCG (5000 UT) tablet Take 1 tablet by mouth Daily. 30 tablet 0    Prenatal MV-Min-Fe Fum-FA-DHA (PRENATAL 1 PO) Take 1 tablet by mouth Every Other Day.      sennosides-docusate (PERICOLACE) 8.6-50 MG per tablet Take 1 tablet by mouth Every Morning.       No current facility-administered medications on file prior to visit.       ALLERGIES:    Allergies   Allergen Reactions    Hydrolyzed Silk Hives, Itching, Palpitations and Shortness Of Breath       Objective      Vitals:    11/08/23 1514   BP: 149/96   Pulse: 82   Temp: 98.1 °F (36.7 °C)  Comment: 1st temp: 99.1, from the temple   TempSrc: Oral   SpO2: 99%   Weight: Comment: patient unable to stand   PainSc: 0-No pain         11/8/2023     3:13 PM   Current Status   ECOG score 2     EXAM :            GENERAL:  Well-developed, Patient  in no acute distress. MOBILIZED BY SCOOTER  SKIN:  Warm, dry ,NO purpura ,no rash.  HEENT:  Pupils were equal and reactive to light and accomodation, conjunctivae noninjected,  normal visual acuity.   NECK:  Supple with good range of motion; no thyromegaly , no JVD or bruits,.No carotid artery pain, no carotid abnormal pulsation   LYMPHATICS:  No cervical, NO supraclavicular, NO axillary, NO inguinal adenopathies.  CARDIAC   normal rate , regular rhythm, without murmur,NO rubs NO S3 NO S4   LUNGS: normal breath sounds bilateral, no wheezing, NO rhonchi, NO crackles ,NO rubs.  VASCULAR VENOUS: no cyanosis, NO collateral circulation, NO varicosities, NO edema, NO palpable cords, NO pain,NO erythema, NO  pigmentation of the skin.  ABDOMEN:  Soft, NO pain,no hepatomegaly, no splenomegaly,no masses, no ascites, no collateral circulation,no distention.  EXTREMITIES  AND SPINE:  No clubbing, no cyanosis ,no deformities , no pain .No kyphosis,  no pain in spine, no pain in ribs , no pain in pelvic bone.  NEUROLOGICAL:  Patient was awake, alert, oriented to time, person and place.PARALYSIS LE            RECENT LABS:  Hematology WBC   Date Value Ref Range Status   11/08/2023 8.10 3.40 - 10.80 10*3/mm3 Final     RBC   Date Value Ref Range Status   11/08/2023 4.83 3.77 - 5.28 10*6/mm3 Final     Hemoglobin   Date Value Ref Range Status   11/08/2023 15.4 12.0 - 15.9 g/dL Final     Hematocrit   Date Value Ref Range Status   11/08/2023 46.3 34.0 - 46.6 % Final     Platelets   Date Value Ref Range Status   11/08/2023 323 140 - 450 10*3/mm3 Final                    Assessment & Plan    This patient has had thrombophilia in the past when I saw her in consultation at [facility] in August of 2020 when she presented with a deep vein thrombosis. This was very extensive. She was placed on anticoagulants and she was discharged. Thrombophilia labs were documented including the fact that she is factor V Leiden positive, heterozygous, prothrombin gene mutation negative and the rest of the thrombophilia labs were negative. The patient has remained anticoagulated since then. She lacked insurance all through 2020 but now she has insurance and she was willing to come and see us. Today we have documented that the patient has tremendous degree of metrorrhagia recently and this triggered very significant anemia with a hemoglobin of 7.5 . Her MCV is low. She denies any GI bleeding. Her diet is appropriate. Her weight is stable. She has no abdominal pain and no pelvic pain. Immobility due to her spina bifida remains ongoing and she mobilizes herself around the house with crutches, here with a small cart.     I gave her the report of  her heterozygous factor V Leiden mutation and I explained to her that she needs to discuss with family members on both sides of her family, father and mother who are alive, the need for them to be tested for this. That is very crucial and I asked her to explain to them why it is important.      I advised her that this is a genetic condition that will remain with her for the rest of her life and given her immobility and spina bifida situation is not going to change, I think that she needs to remain on anticoagulants for the rest of her life as well.     I directed also my advice for treatment of her anemia. She is taking a prenatal vitamin and I pointed out to her that she probably needs to take 2 of them a day to see if we can build up her hemoglobin better now that her dose of Eliquis has been adjusted down and see if she bleeds less. I would like for her to return to see us and a nurse and a blood count and a reticulated hemoglobin in 1 month and I would like to review her back in 2 months. If anemia is not corrected with simple methodology, I think she will require IV iron therapy.      I also suggested dietetic changes including eating some red meat and also leafy things like kale, maria luisa greens, spinach, almonds and oats which are rich in iron as well. She does not like liver or things of this nature.      I asked her also if she continues bleeding in the genitourinary tract to give us a call and she will require to be seen by gynecologist. In the past she has had a fibroid surgery and maybe she will require at some point abdominal and pelvis CT scans.     The other issue that I discussed with her is the need for her eventually to be vaccinated for Covid and I asked her to proceed with this as well whenever this becomes available. I find no formal contraindication for her for this.     I did a breast examination today that is normal and this was performed because on the CT scan of the chest while in the  hospital documented breast nodularity. Now that the patient has insurance her primary physician will proceed with mammograms. I agree with this completely.    The patient was further reviewed on 07/14/2021 after I have advised her before to proceed with a hysterectomy. We modified her dose of anticoagulant for this to happen and then she has recovered. Actually today her hemoglobin after hysterectomy is 11.7, and is very good. Did not change too much, before the hysterectomy was 12. The patient remains on her anticoagulant, Eliquis. No clinical bleeding and no new vein thrombosis in her lower extremities.     Her ferritin is pending. Her reticulated hemoglobin is 30. The patient remains on oral ferrous sulfate, prenatal vitamin 3 times a week and I advised her to remain on this medicine for the time being.     From the point of view of her anticoagulation, given her previous history of factor V Leiden heterozygosity and previous extensive DVT, I advised her to remain on the Eliquis for the time being. Because of her paralysis due to spina bifida, she needs to transfer herself by crutches from her electrical transporter device into the car and I advised her again to be extremely careful in these transfers.     I advised her to return to see me back in 3 months to monitor her CBC, hemoglobin and reticulated hemoglobin, ferritin, iron profile.     The numbers obtained today will be called to her once that they become available.     Otherwise, no other intervention from my point of view.  The patient was further reviewed on 10/13/2021. Since the previous visit the anemia has been completely corrected because the hysterectomy fixed the problem of chronic genitourinary blood loss. The hemoglobin today is up to 14 g. The patient is still taking prenatal vitamins and I asked her to take 1 of them only once a week.     Amazes me how strong she is given her comorbidities and all her problems, living on her own, solving the  problems of life on her own and living a life that is just worth it given the strength of her mental and physical ability to survive. This is enough to just put a show with her and be able to teach people how to live life.      The patient was further reviewed on 05/25/2022. Since the previous visit she has continued her anticoagulant with no difficulty with the consecution of the medicine. She has not had any clinical bleeding, no new thrombosis. Her white count, hemoglobin and platelets are normal today.   Patient returns 11/9/2022 in follow-up continuing on Eliquis 2.5 mg twice daily.  She denies bleeding.  She has had no recent dark stools.  She will continue on Eliquis 2.5 mg twice daily.        On 05/10/2023 this patient returns to the office in her motorized wheelchair in order to be reviewed with no clinical bleeding and no new thrombosis. She takes a reduced dose of Eliquis, she has not had any epistaxis, gingival bleeding or bleeding in her lower extremities. She has not had any fall or trauma associated with her immobility and associated with her neurological disability.     Her white count, hemoglobin and platelets are normal. In the past she was very anemic due to genitourinary blood loss, after hysterectomy this corrected completely. The patient has not had screening mammogram in a good while and I went ahead and scheduled her for this.    I went ahead and advised the patient to continue taking her Eliquis 2.5 mg twice a day, protect herself from trauma in the best way possible and return to see us back in 6 months. She will have a CBC and CMP at that time.     This patient takes high risk medication, requires monitoring for toxicity.  On 11/08/2023, the patient has not had any new issues pertinent to thrombophilia. She remains on her chronic Eliquis 2.5 mg twice a day. No clinical bleeding. No new thrombosis. The patient's white count, hemoglobin and platelets are normal. Given the circumstances, I  advised her to remain on her Eliquis, advising her to be open to minimizing trauma during the winter months given ice and snow. She has to get mobilized in her scooter and is not an easy task for her given her spina bifida restrictions.     Her white count, hemoglobin and platelets are fine. Her chemistry profile is unremarkable. In 6 months we will repeat a CBC and a CMP.                      11/8/2023      CC:

## 2023-11-09 ENCOUNTER — SPECIALTY PHARMACY (OUTPATIENT)
Dept: PHARMACY | Facility: HOSPITAL | Age: 53
End: 2023-11-09
Payer: COMMERCIAL

## 2023-11-09 ENCOUNTER — TELEPHONE (OUTPATIENT)
Dept: OTHER | Facility: HOSPITAL | Age: 53
End: 2023-11-09
Payer: COMMERCIAL

## 2023-11-09 RX ORDER — POTASSIUM CHLORIDE 1500 MG/1
20 TABLET, EXTENDED RELEASE ORAL 2 TIMES DAILY
Qty: 30 TABLET | Refills: 2 | Status: SHIPPED | OUTPATIENT
Start: 2023-11-09

## 2023-11-09 NOTE — TELEPHONE ENCOUNTER
----- Message from Jerald Carrasquillo MD sent at 11/8/2023  6:01 PM EST -----  CALL HER K IS LOW SHE NEEDS TO BE IN 20 MEQ A DAY

## 2023-11-10 NOTE — TELEPHONE ENCOUNTER
Called patient again to relay message. No answer. Left detailed voicemail requesting a call back. Sent in script. Nely Wagner RN

## 2023-11-21 ENCOUNTER — SPECIALTY PHARMACY (OUTPATIENT)
Dept: PHARMACY | Facility: HOSPITAL | Age: 53
End: 2023-11-21
Payer: COMMERCIAL

## 2023-11-21 NOTE — TELEPHONE ENCOUNTER
Caller: John Lucien R    Relationship: Self    Best call back number: [phone]    Requested Prescriptions:   Requested Prescriptions     Pending Prescriptions Disp Refills    apixaban (ELIQUIS) 2.5 MG tablet tablet 60 tablet 5     Sig: Take 1 tablet by mouth 2 (Two) Times a Day.        Pharmacy where request should be sent: [facility]     Last office visit with prescribing clinician: 11/8/2023   Last telemedicine visit with prescribing clinician: Visit date not found   Next office visit with prescribing clinician: 11/6/2024     Additional details provided by patient: LUCIEN FROM THE PHARMACY SENDS TO  PHARMACY IN [place] AND THEY DELIVER TO HER HOME, BILL TO HER CREDIT CARD ON FILE.    Does the patient have less than a 3 day supply:  [x] Yes  [] No    Would you like a call back once the refill request has been completed: [] Yes [x] No    If the office needs to give you a call back, can they leave a voicemail: [x] Yes [] No

## 2023-11-21 NOTE — PROGRESS NOTES
Specialty Pharmacy Refill Coordination Note     Angélica is a 53 y.o. female contacted today regarding refills of Eliquis specialty medication(s).    Reviewed and verified with patient:       Specialty medication(s) and dose(s) confirmed: yes  Eliquis 2.5 mg twice per day    Refill Questions      Flowsheet Row Most Recent Value   Changes to allergies? No   Changes to medications? No   New conditions since last clinic visit No   Unplanned office visit, urgent care, ED, or hospital admission in the last 4 weeks  No   How does patient/caregiver feel medication is working? Good   Financial problems or insurance changes  No   If yes, describe changes in insurance or financial issues. N/A   Since the previous refill, were any specialty medication doses or scheduled injections missed or delayed?  No   Does this patient require a clinical escalation to a pharmacist? No            Delivery Questions      Flowsheet Row Most Recent Value   Delivery method Other (Comment)  [Ship to home address-Ship 11/21 for delivery 11/22-$10 [id number] Confirmed]   Delivery address correct? Yes  [Ship to home address]   Delivery phone number [phone]   Preferred delivery time? AM   Number of medications in delivery 1   Medication(s) being filled and delivered Apixaban   Doses left of specialty medications 3 1/2 days (enough until 11/24/23)   Is there any medication that is due not being filled? No   Supplies needed? No supplies needed   Cooler needed? No   Do any medications need mixed or dated? No   Copay form of payment Credit card on file   Additional comments $10 copay with copay card-CCOF   Questions or concerns for the pharmacist? No   Explain any questions or concerns for the pharmacist N/A   Are any medications first time fills? No          Eliquis delivery coordinated with pt for 11/22/2023 to her home address. Previous attempts to reach her were not successful-she now has 3 1/2 days on hand. $10 copay with insurance and  copay card-CCOF. Her last delivery was on 10/18/2023. No questions or concerns to report to MTM Team today.     Follow-up: 21 day(s)     Angélica Calix, Pharmacy Technician  Specialty Pharmacy Technician

## 2023-12-21 ENCOUNTER — SPECIALTY PHARMACY (OUTPATIENT)
Dept: PHARMACY | Facility: HOSPITAL | Age: 53
End: 2023-12-21
Payer: COMMERCIAL

## 2023-12-21 NOTE — PROGRESS NOTES
Specialty Pharmacy Refill Coordination Note     Angélica is a 53 y.o. female contacted today regarding refills of Eliquis medication(s).    Reviewed and verified with patient:       Specialty medication(s) and dose(s) confirmed: yes  Eliquis 2.5 mg twice per day    Refill Questions      Flowsheet Row Most Recent Value   Changes to allergies? No   Changes to medications? No   New conditions or infections since last clinic visit No   Unplanned office visit, urgent care, ED, or hospital admission in the last 4 weeks  No   How does patient/caregiver feel medication is working? Good   Financial problems or insurance changes  No   If yes, describe changes in insurance or financial issues. N/A   Since the previous refill, were any specialty medication doses or scheduled injections missed or delayed?  No   Does this patient require a clinical escalation to a pharmacist? No            Delivery Questions      Flowsheet Row Most Recent Value   Delivery method Beeline  [Ship to home address-Ship 12/21 for delivery 12/22-$10 [id number] Confirmed]   Delivery address verified with patient/caregiver? Yes  [Ship to home address]   Delivery address Home   Number of medications in delivery 1   Medication(s) being filled and delivered Apixaban   Doses left of specialty medications 4 days   Copay verified? Yes   Copay amount $10 copay-CCOF   Copay form of payment Credit/debit on file          Eliquis delivery coordinated with pt for 12/22/2023 to her home address. $10 copay with insurance-CCOF. Her last delivery was on 11/21/2023. No questions or concerns to report to MTM Team today.     Follow-up: 21 day(s)     Angélica Calix, Pharmacy Technician  Specialty Pharmacy Technician

## 2024-01-22 ENCOUNTER — SPECIALTY PHARMACY (OUTPATIENT)
Dept: PHARMACY | Facility: HOSPITAL | Age: 54
End: 2024-01-22
Payer: COMMERCIAL

## 2024-01-22 NOTE — PROGRESS NOTES
Specialty Pharmacy Refill Coordination Note     Angélica is a 53 y.o. female contacted today regarding refills of Eliquis medication.    Reviewed and verified with patient:       Specialty medication(s) and dose(s) confirmed: yes  Eliquis 2.5 mg twice per day.    Refill Questions      Flowsheet Row Most Recent Value   Changes to allergies? No   Changes to medications? No   New conditions or infections since last clinic visit No   Unplanned office visit, urgent care, ED, or hospital admission in the last 4 weeks  No   How does patient/caregiver feel medication is working? Good   Financial problems or insurance changes  No   If yes, describe changes in insurance or financial issues. N/A   Since the previous refill, were any specialty medication doses or scheduled injections missed or delayed?  No   Does this patient require a clinical escalation to a pharmacist? No            Delivery Questions      Flowsheet Row Most Recent Value   Delivery method Beeline  [Ship to home address-Ship 1/22 for delivery 1/23-$10 copay with copay card-CCOF-Address Confirmed]   Delivery address verified with patient/caregiver? Yes  [Ship to home address]   Delivery address Home   Number of medications in delivery 1   Medication(s) being filled and delivered Apixaban   Doses left of specialty medications 2 days (enough until 1/24)   Copay verified? Yes   Copay amount $10 copay-CCOF   Copay form of payment Credit/debit on file          Eliquis delivery coordinated with pt for 1/23/2024 to her home address. $10 copay with copay card-CCOF. Her last delivery was on 12/22/2023. No questions or concerns to report to MTM Team today.     Follow-up: 21 day(s)     Angélica Calix, Pharmacy Technician  Specialty Pharmacy Technician

## 2024-02-20 ENCOUNTER — SPECIALTY PHARMACY (OUTPATIENT)
Dept: PHARMACY | Facility: HOSPITAL | Age: 54
End: 2024-02-20
Payer: COMMERCIAL

## 2024-02-20 NOTE — PROGRESS NOTES
Specialty Pharmacy Refill Coordination Note     Angélica is a 53 y.o. female contacted today regarding refills of Eliquis medication(s).    Reviewed and verified with patient:       Specialty medication(s) and dose(s) confirmed: yes  Eliquis 2.5 mg twice per day.    Refill Questions      Flowsheet Row Most Recent Value   Changes to allergies? No   Changes to medications? No   New conditions or infections since last clinic visit No   Unplanned office visit, urgent care, ED, or hospital admission in the last 4 weeks  No   How does patient/caregiver feel medication is working? Good   Financial problems or insurance changes  No   If yes, describe changes in insurance or financial issues. N/A   Since the previous refill, were any specialty medication doses or scheduled injections missed or delayed?  No   Does this patient require a clinical escalation to a pharmacist? No            Delivery Questions      Flowsheet Row Most Recent Value   Delivery method Beeline  [Ship to home address-Ship 2/21 for delivery 2/22-$10 [id number] Confirmed]   Delivery address verified with patient/caregiver? Yes  [Ship to home address]   Delivery address Home   Number of medications in delivery 1   Medication(s) being filled and delivered Apixaban   Doses left of specialty medications 4 days   Copay verified? Yes   Copay amount $10 copay-CCOF   Copay form of payment Credit/debit on file          Eliquis delivery coordinated with pt for 2/22/2024 to her home address. $10 copay-CCOF. Her last delivery was on 1/22/2024. No questions or concerns to report to MTM Team today.     Follow-up: 21 day(s)     Angélica Calix, Pharmacy Technician  Specialty Pharmacy Technician

## 2024-02-28 ENCOUNTER — OFFICE VISIT (OUTPATIENT)
Dept: FAMILY MEDICINE CLINIC | Facility: CLINIC | Age: 54
End: 2024-02-28
Payer: COMMERCIAL

## 2024-02-28 VITALS
DIASTOLIC BLOOD PRESSURE: 84 MMHG | TEMPERATURE: 98.6 F | HEIGHT: 58 IN | BODY MASS INDEX: 28.22 KG/M2 | OXYGEN SATURATION: 96 % | HEART RATE: 90 BPM | SYSTOLIC BLOOD PRESSURE: 142 MMHG

## 2024-02-28 DIAGNOSIS — N30.00 ACUTE CYSTITIS WITHOUT HEMATURIA: ICD-10-CM

## 2024-02-28 DIAGNOSIS — Z76.89 ENCOUNTER TO ESTABLISH CARE: Primary | ICD-10-CM

## 2024-02-28 DIAGNOSIS — Z74.09 DECLINING MOBILITY: Chronic | ICD-10-CM

## 2024-02-28 DIAGNOSIS — I10 PRIMARY HYPERTENSION: ICD-10-CM

## 2024-02-28 DIAGNOSIS — Z86.718 HISTORY OF DVT OF LOWER EXTREMITY: Chronic | ICD-10-CM

## 2024-02-28 DIAGNOSIS — E87.6 HYPOKALEMIA: ICD-10-CM

## 2024-02-28 DIAGNOSIS — R74.8 ALKALINE PHOSPHATASE ELEVATION: ICD-10-CM

## 2024-02-28 DIAGNOSIS — Z11.59 NEED FOR HEPATITIS C SCREENING TEST: ICD-10-CM

## 2024-02-28 DIAGNOSIS — Z12.11 COLON CANCER SCREENING: ICD-10-CM

## 2024-02-28 DIAGNOSIS — K59.04 CHRONIC IDIOPATHIC CONSTIPATION: Chronic | ICD-10-CM

## 2024-02-28 DIAGNOSIS — Z12.31 BREAST CANCER SCREENING BY MAMMOGRAM: ICD-10-CM

## 2024-02-28 DIAGNOSIS — Q05.2 SPINA BIFIDA OF LUMBOSACRAL REGION WITH HYDROCEPHALUS: Chronic | ICD-10-CM

## 2024-02-28 DIAGNOSIS — H61.23 BILATERAL IMPACTED CERUMEN: ICD-10-CM

## 2024-02-28 RX ORDER — POLYETHYLENE GLYCOL 3350 17 G/17G
17 POWDER, FOR SOLUTION ORAL DAILY
Qty: 1500 G | Refills: 1 | Status: SHIPPED | OUTPATIENT
Start: 2024-02-28 | End: 2024-08-26

## 2024-02-28 RX ORDER — AMOXICILLIN 250 MG
1 CAPSULE ORAL EVERY MORNING
Qty: 90 TABLET | Refills: 1 | Status: SHIPPED | OUTPATIENT
Start: 2024-02-28

## 2024-02-28 NOTE — ASSESSMENT & PLAN NOTE
Will start miralax.    Educated patient regarding adequate intake of fluids in the form of water & fiber in the form of fruits/vegetables, patient voiced understanding.

## 2024-02-28 NOTE — PROGRESS NOTES
Chief Complaint  new pcp off [facility]    Subjective        Angélicageetha Lopez presents to [facility]  History of Present Illness  52yo WF Patient was previously seen by PCP Dr. Schneider at [facility], however patient is new to me and is here for establishment of care visit for chronic medical conditions including spina bifida, history of DVT, and decreased mobility.    Patient reports she takes Eliquis for history of DVT.    Pt c/o right ear stuffed up for several months.  Pt has spina bifida and has been having to pay for pull ups out of pocket for urinary incontinence, now wants to get est with a  DME company. Also needs wipes and gloves.    Pt states office of vocational rehab is assisting pt obtain a ibot which is a motorized mobility device.    Pt reports hx of spina bifida and over time it has impacted pt's mobility and now pt reports she is unable to walk.  Pt c/o muscle weakness and c/o unable to bear weight on both legs.  Pt is currently using a scooter.    Discussed hypokalemia on previous labs and the need to repeat potassium level today.  Discussed elevated alkaline phosphatase and the need for bone scan considering patient's immobility, patient was understanding.  Also discussed routine preventive screening including mammogram and colorectal screening.  Patient desires Cologuard for colorectal screening.    Patient consented to hepatitis lab test today after discussion.  Discussed elevated blood pressure reading on 2 separate visits and the diagnosis of hypertension.  Patient states my blood pressure usually runs high.  Patient hesitant to start an antihypertensive today.  Discussed proper diet and lifestyle changes and will recheck blood pressure on follow-up visit and consider starting medication for hypertension at that time, patient voiced understanding.      Objective   Vital Signs:  /84   Pulse 90   Temp 98.6 °F (37 °C)    "Ht 147.3 cm (58\")   SpO2 96%   BMI 28.22 kg/m²   Estimated body mass index is 28.22 kg/m² as calculated from the following:    Height as of this encounter: 147.3 cm (58\").    Weight as of 10/13/21: 61.2 kg (135 lb).             Physical Exam  Constitutional:       Appearance: Normal appearance.      Comments: In a scooter at clinic today   HENT:      Head: Normocephalic and atraumatic.      Right Ear: There is impacted cerumen.      Left Ear: There is impacted cerumen.   Eyes:      Conjunctiva/sclera: Conjunctivae normal.   Cardiovascular:      Rate and Rhythm: Normal rate and regular rhythm.      Heart sounds: Normal heart sounds.   Pulmonary:      Effort: Pulmonary effort is normal.      Breath sounds: Normal breath sounds.   Abdominal:      General: Bowel sounds are normal.      Palpations: Abdomen is soft.      Comments: Non-tender   Skin:     General: Skin is warm.   Neurological:      General: No focal deficit present.      Mental Status: She is alert and oriented to person, place, and time.   Psychiatric:         Mood and Affect: Mood normal.         Behavior: Behavior normal.        Result Review :    The following data was reviewed by: CRISTA Sanderson on 02/28/2024:  CMP          5/10/2023    13:20 11/8/2023    14:53   CMP   Glucose 90  90    BUN 13  6    Creatinine 0.66  0.62    EGFR 106.4  107.3    Sodium 143  142    Potassium 4.4  3.3    Chloride 104  102    Calcium 10.3  9.1    Total Protein 7.8  7.0    Albumin 4.5  4.2    Globulin 3.3  2.8    Total Bilirubin 0.6  1.2    Alkaline Phosphatase 131  146    AST (SGOT) 15  24    ALT (SGPT) 23  27    Albumin/Globulin Ratio 1.4  1.5    BUN/Creatinine Ratio 19.7  9.7    Anion Gap 13.2  16.8      CBC          5/10/2023    13:20 11/8/2023    14:53   CBC   WBC 8.35  8.10    RBC 4.90  4.83    Hemoglobin 15.0  15.4    Hematocrit 46.9  46.3    MCV 95.7  95.9    MCH 30.6  31.9    MCHC 32.0  33.3    RDW 13.0  14.2    Platelets 324  323  "                          Assessment and Plan     Diagnoses and all orders for this visit:    1. Encounter to establish care (Primary)    2. Spina bifida of lumbosacral region with hydrocephalus  -     Ambulatory Referral to Occupational Therapy    3. Bilateral impacted cerumen  -     carbamide peroxide (Debrox) 6.5 % otic solution; Administer 5 drops into both ears 2 (Two) Times a Day.  Dispense: 1 each; Refill: 0    4. Declining mobility  -     Ambulatory Referral to Occupational Therapy    5. Alkaline phosphatase elevation  -     DEXA Bone Density Axial; Future    6. Colon cancer screening  -     Cologuard - Stool, Per Rectum; Future    7. Breast cancer screening by mammogram  -     Mammo Screening Digital Tomosynthesis Bilateral With CAD; Future    8. Primary hypertension  Assessment & Plan:  New diagnosis of hypertension today.  Discussed the importance of healthy diet, nutrition, and lifestyle. Recommend low salt, fat/cholesterol diet and avoid concentrated sweets. Encouraged DASH diet along with fresh fruits & vegetables and low fat dairy products. Counseled patient to exercise/walk as tolerated. Avoid tobacco and alcohol use.      Orders:  -     Basic metabolic panel  -     Urinalysis With Microscopic - Urine, Clean Catch  -     TSH Rfx On Abnormal To Free T4    9. Hypokalemia  -     Basic metabolic panel    10. Need for hepatitis C screening test  -     Hepatitis C Antibody    11. Chronic idiopathic constipation  Assessment & Plan:  Will start miralax.    Educated patient regarding adequate intake of fluids in the form of water & fiber in the form of fruits/vegetables, patient voiced understanding.      Orders:  -     sennosides-docusate (PERICOLACE) 8.6-50 MG per tablet; Take 1 tablet by mouth Every Morning.  Dispense: 90 tablet; Refill: 1  -     polyethylene glycol (MiraLax) 17 GM/SCOOP powder; Take 17 g by mouth Daily for 180 days. Mix one scoopfull in a full glass of water and mix and drink once a day.   Dispense: 1500 g; Refill: 1    12. History of DVT of lower extremity  -     apixaban (ELIQUIS) 2.5 MG tablet tablet; Take 1 tablet by mouth 2 (Two) Times a Day.  Dispense: 60 tablet; Refill: 5             Follow Up     Return in about 4 weeks (around 3/27/2024) for Next scheduled follow up, Annual physical.  Patient was given instructions and counseling regarding her condition or for health maintenance advice. Please see specific information pulled into the AVS if appropriate.

## 2024-02-28 NOTE — ASSESSMENT & PLAN NOTE
New diagnosis of hypertension today.  Discussed the importance of healthy diet, nutrition, and lifestyle. Recommend low salt, fat/cholesterol diet and avoid concentrated sweets. Encouraged DASH diet along with fresh fruits & vegetables and low fat dairy products. Counseled patient to exercise/walk as tolerated. Avoid tobacco and alcohol use.

## 2024-02-29 PROBLEM — N30.00 ACUTE CYSTITIS WITHOUT HEMATURIA: Status: ACTIVE | Noted: 2024-02-29

## 2024-02-29 LAB
APPEARANCE UR: ABNORMAL
BACTERIA #/AREA URNS HPF: ABNORMAL /[HPF]
BILIRUB UR QL STRIP: NEGATIVE
BUN SERPL-MCNC: 9 MG/DL (ref 6–24)
BUN/CREAT SERPL: 13 (ref 9–23)
CALCIUM SERPL-MCNC: 10.1 MG/DL (ref 8.7–10.2)
CASTS URNS QL MICRO: ABNORMAL /LPF
CHLORIDE SERPL-SCNC: 100 MMOL/L (ref 96–106)
CO2 SERPL-SCNC: 24 MMOL/L (ref 20–29)
COLOR UR: YELLOW
CREAT SERPL-MCNC: 0.67 MG/DL (ref 0.57–1)
EGFRCR SERPLBLD CKD-EPI 2021: 105 ML/MIN/1.73
EPI CELLS #/AREA URNS HPF: ABNORMAL /HPF (ref 0–10)
GLUCOSE SERPL-MCNC: 90 MG/DL (ref 70–99)
GLUCOSE UR QL STRIP: NEGATIVE
HCV IGG SERPL QL IA: NON REACTIVE
HGB UR QL STRIP: NEGATIVE
KETONES UR QL STRIP: NEGATIVE
LEUKOCYTE ESTERASE UR QL STRIP: ABNORMAL
MICRO URNS: ABNORMAL
NITRITE UR QL STRIP: NEGATIVE
PH UR STRIP: 6.5 [PH] (ref 5–7.5)
POTASSIUM SERPL-SCNC: 4.1 MMOL/L (ref 3.5–5.2)
PROT UR QL STRIP: ABNORMAL
RBC #/AREA URNS HPF: ABNORMAL /HPF (ref 0–2)
SODIUM SERPL-SCNC: 141 MMOL/L (ref 134–144)
SP GR UR STRIP: 1.02 (ref 1–1.03)
TSH SERPL DL<=0.005 MIU/L-ACNC: 1.16 UIU/ML (ref 0.45–4.5)
UROBILINOGEN UR STRIP-MCNC: 1 MG/DL (ref 0.2–1)
WBC #/AREA URNS HPF: ABNORMAL /HPF (ref 0–5)

## 2024-02-29 RX ORDER — NITROFURANTOIN 25; 75 MG/1; MG/1
100 CAPSULE ORAL 2 TIMES DAILY
Qty: 10 CAPSULE | Refills: 0 | Status: SHIPPED | OUTPATIENT
Start: 2024-02-29

## 2024-03-06 ENCOUNTER — TELEPHONE (OUTPATIENT)
Dept: FAMILY MEDICINE CLINIC | Facility: CLINIC | Age: 54
End: 2024-03-06
Payer: COMMERCIAL

## 2024-03-06 DIAGNOSIS — Q05.2 SPINA BIFIDA OF LUMBOSACRAL REGION WITH HYDROCEPHALUS: ICD-10-CM

## 2024-03-06 DIAGNOSIS — Z74.09 DECLINING MOBILITY: Primary | ICD-10-CM

## 2024-03-06 NOTE — TELEPHONE ENCOUNTER
Caller: Angélica Lopez    Relationship: Self    Best call back number:     [phone]        What is the best time to reach you: ANYTIME    Who are you requesting to speak with (clinical staff, provider,  specific staff member): CLINICAL    What was the call regarding: PATIENT CALLING STATING THAT [facility] ISNT GOING TO DO THE SEATING ASSESSMENT FOR HER. SHE WOULD LIKE TO BE REFERRED TO EITHER REARDON.    Is it okay if the provider responds through MyChart: YES

## 2024-03-12 NOTE — TELEPHONE ENCOUNTER
Caller: Angélica Lopez    Relationship: Self    Best call back number: [phone]     Who are you requesting to speak with (clinical staff, provider,  specific staff member): REFERRAL COORDINATOR    What was the call regarding: [facility] STATES THEY HAVE NOT RECEIVED THE REFERRAL FOR THE PATIENT. PLEASE RE-FAX #[phone] ATTN CALLI.     PLEASE ADVISE WHEN SENT.

## 2024-03-22 ENCOUNTER — SPECIALTY PHARMACY (OUTPATIENT)
Dept: PHARMACY | Facility: HOSPITAL | Age: 54
End: 2024-03-22
Payer: COMMERCIAL

## 2024-03-22 DIAGNOSIS — Z86.718 HISTORY OF DVT OF LOWER EXTREMITY: Chronic | ICD-10-CM

## 2024-03-22 NOTE — PROGRESS NOTES
Specialty Pharmacy Refill Coordination Note     Angélica is a 53 y.o. female contacted today regarding refills of Eliquis specialty medication(s).    Reviewed and verified with patient:       Specialty medication(s) and dose(s) confirmed: yes  Eliquis 2.5 mg twice per day.    Refill Questions      Flowsheet Row Most Recent Value   Changes to allergies? No   Changes to medications? No   New conditions or infections since last clinic visit No   Unplanned office visit, urgent care, ED, or hospital admission in the last 4 weeks  No   How does patient/caregiver feel medication is working? Good   Financial problems or insurance changes  No   If yes, describe changes in insurance or financial issues. N/A   Since the previous refill, were any specialty medication doses or scheduled injections missed or delayed?  No   Does this patient require a clinical escalation to a pharmacist? No            Delivery Questions      Flowsheet Row Most Recent Value   Delivery method Beeline  [Ship to home address-Ship 3/25 for delivery 3/26-$10 [id number] Confirmed]   Delivery address verified with patient/caregiver? Yes  [Ship to home address]   Delivery address Home   Number of medications in delivery 1   Medication(s) being filled and delivered Apixaban   Doses left of specialty medications 4 days   Copay verified? Yes   Copay amount $10 copay-CCOF   Copay form of payment Credit/debit on file          Eliquis delivery coordinated with pt for 3/26/2024 to her home address. $10 copay-CCOF. Her last delivery was on 2/22/2024. No questions or concerns to report to MTM Team today.     Follow-up: 21 day(s)     Angélica Calix, Pharmacy Technician  Specialty Pharmacy Technician

## 2024-03-27 ENCOUNTER — OFFICE VISIT (OUTPATIENT)
Dept: FAMILY MEDICINE CLINIC | Facility: CLINIC | Age: 54
End: 2024-03-27
Payer: COMMERCIAL

## 2024-03-27 VITALS
TEMPERATURE: 97.5 F | WEIGHT: 150 LBS | HEART RATE: 75 BPM | HEIGHT: 58 IN | BODY MASS INDEX: 31.49 KG/M2 | OXYGEN SATURATION: 99 % | SYSTOLIC BLOOD PRESSURE: 142 MMHG | DIASTOLIC BLOOD PRESSURE: 76 MMHG

## 2024-03-27 DIAGNOSIS — H61.23 BILATERAL IMPACTED CERUMEN: ICD-10-CM

## 2024-03-27 DIAGNOSIS — Z86.718 HISTORY OF DVT OF LOWER EXTREMITY: Chronic | ICD-10-CM

## 2024-03-27 DIAGNOSIS — I10 PRIMARY HYPERTENSION: ICD-10-CM

## 2024-03-27 DIAGNOSIS — Z13.220 LIPID SCREENING: ICD-10-CM

## 2024-03-27 DIAGNOSIS — N39.498 OTHER URINARY INCONTINENCE: ICD-10-CM

## 2024-03-27 DIAGNOSIS — K59.04 CHRONIC IDIOPATHIC CONSTIPATION: Chronic | ICD-10-CM

## 2024-03-27 DIAGNOSIS — R15.9 INCONTINENCE OF FECES, UNSPECIFIED FECAL INCONTINENCE TYPE: ICD-10-CM

## 2024-03-27 DIAGNOSIS — E55.9 VITAMIN D DEFICIENCY: ICD-10-CM

## 2024-03-27 DIAGNOSIS — Z00.00 ANNUAL PHYSICAL EXAM: Primary | ICD-10-CM

## 2024-03-27 PROBLEM — Z76.89 ENCOUNTER TO ESTABLISH CARE: Status: RESOLVED | Noted: 2024-02-28 | Resolved: 2024-03-27

## 2024-03-27 PROBLEM — Z12.31 BREAST CANCER SCREENING BY MAMMOGRAM: Status: RESOLVED | Noted: 2024-02-28 | Resolved: 2024-03-27

## 2024-03-27 PROBLEM — Z11.59 NEED FOR HEPATITIS C SCREENING TEST: Status: RESOLVED | Noted: 2024-02-28 | Resolved: 2024-03-27

## 2024-03-27 PROBLEM — R32 ABSENCE OF BLADDER CONTINENCE: Status: ACTIVE | Noted: 2024-03-27

## 2024-03-27 PROBLEM — Z12.11 COLON CANCER SCREENING: Status: RESOLVED | Noted: 2024-02-28 | Resolved: 2024-03-27

## 2024-03-27 PROBLEM — N30.00 ACUTE CYSTITIS WITHOUT HEMATURIA: Status: RESOLVED | Noted: 2024-02-29 | Resolved: 2024-03-27

## 2024-03-27 RX ORDER — BROMPHENIRAMIN/PSEUDOEPHEDRINE 1-15MG/5ML
1 LIQUID (ML) ORAL EVERY 4 HOURS PRN
Qty: 180 EACH | Refills: 12 | Status: SHIPPED | OUTPATIENT
Start: 2024-03-27 | End: 2024-03-27

## 2024-03-27 RX ORDER — B-COMPLEX WITH VITAMIN C
1 TABLET ORAL DAILY
Qty: 90 TABLET | Refills: 3 | Status: SHIPPED | OUTPATIENT
Start: 2024-03-27

## 2024-03-27 RX ORDER — RESVER/WINE/BFL/GRPSD/PC/C/POM 200MG-60MG
1 CAPSULE ORAL DAILY
Qty: 30 TABLET | Refills: 2 | Status: SHIPPED | OUTPATIENT
Start: 2024-03-27

## 2024-03-27 RX ORDER — BROMPHENIRAMIN/PSEUDOEPHEDRINE 1-15MG/5ML
1 LIQUID (ML) ORAL EVERY 4 HOURS PRN
Qty: 180 EACH | Refills: 12 | Status: SHIPPED | OUTPATIENT
Start: 2024-03-27

## 2024-03-27 RX ORDER — POLYETHYLENE GLYCOL 3350 17 G/17G
17 POWDER, FOR SOLUTION ORAL DAILY
Qty: 1500 G | Refills: 5 | Status: SHIPPED | OUTPATIENT
Start: 2024-03-27

## 2024-03-27 NOTE — ASSESSMENT & PLAN NOTE
Hypertension is stable and controlled  Continue current treatment regimen.  Blood pressure will be reassessed in 3 months.  Discussed the importance of healthy diet, nutrition, and lifestyle. Recommend low salt, fat/cholesterol diet and avoid concentrated sweets. Encouraged DASH diet along with fresh fruits & vegetables and low fat dairy products. Counseled patient to exercise/walk as tolerated. Avoid tobacco and alcohol use.

## 2024-03-27 NOTE — ASSESSMENT & PLAN NOTE
Inform patient we will send the Debrox to [facility] pharmacy at this time.  Instructed patient to return to clinic after she is done with Debrox for ear lavage, patient was understanding.

## 2024-03-27 NOTE — PROGRESS NOTES
"Chief Complaint  Annual Exam (NEEDS DMS TO [employer] FOR OVERNIGHT PULL UPS USING 6 PER DAY)    Subjective        Angélicageetha Lopez presents to [facility]  History of Present Illness      53-year-old white female with a history of hypertension, urinary incontinence, and other medical issues here for follow-up visit and annual wellness visit.    Patient reports she has both urinary and fecal incontinence and desires adult briefs.  Patient reports to [employer] brand that always work well for her.  Patient reports the pharmacy [facility] did not fill her Debrox on last visit and patient still has otalgia.          Objective   Vital Signs:  /76   Pulse 75   Temp 97.5 °F (36.4 °C)   Ht 147.3 cm (58\")   Wt 68 kg (150 lb)   SpO2 99%   BMI 31.35 kg/m²   Estimated body mass index is 31.35 kg/m² as calculated from the following:    Height as of this encounter: 147.3 cm (58\").    Weight as of this encounter: 68 kg (150 lb).       BMI is >= 30 and <35. (Class 1 Obesity). The following options were offered after discussion;: exercise counseling/recommendations and nutrition counseling/recommendations      Physical Exam  Constitutional:       Appearance: Normal appearance.      Comments: SCOOTER BOUND   HENT:      Head: Normocephalic and atraumatic.      Right Ear: There is impacted cerumen.      Left Ear: There is impacted cerumen.      Ears:      Comments: HARD WAX B/L EARS  Eyes:      Conjunctiva/sclera: Conjunctivae normal.   Cardiovascular:      Rate and Rhythm: Normal rate and regular rhythm.      Heart sounds: Normal heart sounds.   Pulmonary:      Effort: Pulmonary effort is normal.      Breath sounds: Normal breath sounds.   Abdominal:      General: Bowel sounds are normal.      Palpations: Abdomen is soft.      Comments: Non-tender   Skin:     General: Skin is warm.   Neurological:      General: No focal deficit present.      Mental Status: She is alert and oriented to person, place, and " time.   Psychiatric:         Mood and Affect: Mood normal.         Behavior: Behavior normal.        Result Review :    The following data was reviewed by: CRISTA Sanderson on 03/27/2024:  CMP          5/10/2023    13:20 11/8/2023    14:53 2/28/2024    16:03   CMP   Glucose 90  90  90    BUN 13  6  9    Creatinine 0.66  0.62  0.67    EGFR 106.4  107.3     Sodium 143  142  141    Potassium 4.4  3.3  4.1    Chloride 104  102  100    Calcium 10.3  9.1  10.1    Total Protein 7.8  7.0     Albumin 4.5  4.2     Globulin 3.3  2.8     Total Bilirubin 0.6  1.2     Alkaline Phosphatase 131  146     AST (SGOT) 15  24     ALT (SGPT) 23  27     Albumin/Globulin Ratio 1.4  1.5     BUN/Creatinine Ratio 19.7  9.7  13    Anion Gap 13.2  16.8       CBC          5/10/2023    13:20 11/8/2023    14:53   CBC   WBC 8.35  8.10    RBC 4.90  4.83    Hemoglobin 15.0  15.4    Hematocrit 46.9  46.3    MCV 95.7  95.9    MCH 30.6  31.9    MCHC 32.0  33.3    RDW 13.0  14.2    Platelets 324  323        TSH          2/28/2024    16:03   TSH   TSH 1.160                       Assessment and Plan     Diagnoses and all orders for this visit:    1. Annual physical exam (Primary)  Assessment & Plan:  Counseling was provided on nutrition, physical activity, development, and injury prevention, dental health, and safe sex practices patient verbalizes understanding no additional questions were asked.           2. Bilateral impacted cerumen  Assessment & Plan:  Inform patient we will send the Debrox to [facility] pharmacy at this time.  Instructed patient to return to clinic after she is done with Debrox for ear lavage, patient was understanding.    Orders:  -     carbamide peroxide (Debrox) 6.5 % otic solution; Administer 5 drops into both ears 2 (Two) Times a Day.  Dispense: 1 each; Refill: 0    3. Primary hypertension  Assessment & Plan:  Hypertension is stable and controlled  Continue current treatment regimen.  Blood pressure will be reassessed in  3 months.  Discussed the importance of healthy diet, nutrition, and lifestyle. Recommend low salt, fat/cholesterol diet and avoid concentrated sweets. Encouraged DASH diet along with fresh fruits & vegetables and low fat dairy products. Counseled patient to exercise/walk as tolerated. Avoid tobacco and alcohol use.        4. Other urinary incontinence  -     Discontinue: Incontinence Supply Disposable (Briefs Overnight Medium) misc; Use 1 each Every 4 (Four) Hours As Needed (INCONTINENCE).  Dispense: 180 each; Refill: 12  -     Incontinence Supply Disposable (Briefs Overnight Medium) misc; Use 1 each Every 4 (Four) Hours As Needed (INCONTINENCE).  Dispense: 180 each; Refill: 12    5. Incontinence of feces, unspecified fecal incontinence type  Assessment & Plan:  Provided patient paper prescription for adult briefs, patient voiced understanding.    Orders:  -     Discontinue: Incontinence Supply Disposable (Briefs Overnight Medium) misc; Use 1 each Every 4 (Four) Hours As Needed (INCONTINENCE).  Dispense: 180 each; Refill: 12  -     Incontinence Supply Disposable (Briefs Overnight Medium) misc; Use 1 each Every 4 (Four) Hours As Needed (INCONTINENCE).  Dispense: 180 each; Refill: 12    6. Chronic idiopathic constipation  Assessment & Plan:  Educated patient regarding adequate intake of fluids in the form of water & fiber in the form of fruits/vegetables, patient voiced understanding.      Orders:  -     polyethylene glycol (MiraLax) 17 GM/SCOOP powder; Take 17 g by mouth Daily. Mix one scoopfull in a full glass of water and mix and drink once a day.  Dispense: 1500 g; Refill: 5    7. History of DVT of lower extremity  Assessment & Plan:  Patient currently on Eliquis    Orders:  -     apixaban (ELIQUIS) 2.5 MG tablet tablet; Take 1 tablet by mouth 2 (Two) Times a Day.  Dispense: 60 tablet; Refill: 5    8. Vitamin D deficiency  -     Cholecalciferol (D-5000) 125 MCG (5000 UT) tablet; Take 1 tablet by mouth Daily.   Dispense: 30 tablet; Refill: 2  -     Vitamin D,25-Hydroxy    9. Lipid screening  -     Lipid Panel    Other orders  -     Prenatal Multivit-Min-Fe-FA (Prenatal, w/Iron & FA,) 27-0.8 MG tablet; Take 1 tablet by mouth Daily.  Dispense: 90 tablet; Refill: 3        All chronic conditions have been addressed and treated by the practice or other specialists. Medications have been reconciled and refilled as appropriate. Reiterated compliance and timely follow up appointments. Side effects of all new and old medications reviewed with the patient and patient willing to accept all risks involved. Advised RTO if no improvement or worsening of symptoms or if any new complaints arise. Patient advised to follow up with clinic or call after diagnostic tests, if patient does not hear from office 3 days after the test completion.          Follow Up     Return in about 3 months (around 6/27/2024) for Next scheduled follow up.  Patient was given instructions and counseling regarding her condition or for health maintenance advice. Please see specific information pulled into the AVS if appropriate.

## 2024-03-28 LAB
25(OH)D3+25(OH)D2 SERPL-MCNC: 27.1 NG/ML (ref 30–100)
CHOLEST SERPL-MCNC: 186 MG/DL (ref 100–199)
HDLC SERPL-MCNC: 63 MG/DL
LDLC SERPL CALC-MCNC: 97 MG/DL (ref 0–99)
TRIGL SERPL-MCNC: 149 MG/DL (ref 0–149)
VLDLC SERPL CALC-MCNC: 26 MG/DL (ref 5–40)

## 2024-04-24 ENCOUNTER — SPECIALTY PHARMACY (OUTPATIENT)
Dept: PHARMACY | Facility: HOSPITAL | Age: 54
End: 2024-04-24
Payer: COMMERCIAL

## 2024-04-24 NOTE — PROGRESS NOTES
Specialty Pharmacy Refill Coordination Note     Angélica is a 53 y.o. female contacted today regarding refills of Eliquis medication(s).    Reviewed and verified with patient:       Specialty medication(s) and dose(s) confirmed: yes  Eliquis 2.5 mg twice per day.    Refill Questions      Flowsheet Row Most Recent Value   Changes to allergies? No   Changes to medications? No   New conditions or infections since last clinic visit No   Unplanned office visit, urgent care, ED, or hospital admission in the last 4 weeks  No   How does patient/caregiver feel medication is working? Good   Financial problems or insurance changes  No   If yes, describe changes in insurance or financial issues. N/A   Since the previous refill, were any specialty medication doses or scheduled injections missed or delayed?  No   Does this patient require a clinical escalation to a pharmacist? No            Delivery Questions      Flowsheet Row Most Recent Value   Delivery method Beeline  [Ship to home address-Ship 4/24 for delivery 4/25-$10 [id number] Confirmed]   Delivery address verified with patient/caregiver? Yes  [Ship to home address]   Delivery address Home   Number of medications in delivery 1   Medication(s) being filled and delivered Apixaban   Doses left of specialty medications Enough for 4/25   Copay verified? Yes   Copay amount $10 copay-CCOF   Copay form of payment Credit/debit on file          Eliquis delivery coordinated with pt for 4/25/2024 to her home address. $10 copay with insurance and copay card. Her last delivery was on 3/26/2024. No questions or concerns to report to MTM Team today.     Follow-up: 21 day(s)     Angélica Calix, Pharmacy Technician  Specialty Pharmacy Technician

## 2024-05-15 ENCOUNTER — SPECIALTY PHARMACY (OUTPATIENT)
Dept: ONCOLOGY | Facility: HOSPITAL | Age: 54
End: 2024-05-15
Payer: COMMERCIAL

## 2024-05-15 ENCOUNTER — OFFICE VISIT (OUTPATIENT)
Dept: ONCOLOGY | Facility: CLINIC | Age: 54
End: 2024-05-15
Payer: COMMERCIAL

## 2024-05-15 ENCOUNTER — LAB (OUTPATIENT)
Dept: LAB | Facility: HOSPITAL | Age: 54
End: 2024-05-15
Payer: COMMERCIAL

## 2024-05-15 VITALS
OXYGEN SATURATION: 98 % | BODY MASS INDEX: 31.49 KG/M2 | WEIGHT: 150 LBS | HEART RATE: 89 BPM | DIASTOLIC BLOOD PRESSURE: 84 MMHG | SYSTOLIC BLOOD PRESSURE: 131 MMHG | HEIGHT: 58 IN | TEMPERATURE: 98.9 F

## 2024-05-15 DIAGNOSIS — D68.51 FACTOR 5 LEIDEN MUTATION, HETEROZYGOUS: ICD-10-CM

## 2024-05-15 DIAGNOSIS — Z79.01 CHRONIC ANTICOAGULATION: ICD-10-CM

## 2024-05-15 DIAGNOSIS — Z79.899 HIGH RISK MEDICATION USE: ICD-10-CM

## 2024-05-15 DIAGNOSIS — E83.52 HYPERCALCEMIA: ICD-10-CM

## 2024-05-15 DIAGNOSIS — D68.51 FACTOR 5 LEIDEN MUTATION, HETEROZYGOUS: Primary | ICD-10-CM

## 2024-05-15 LAB
ALBUMIN SERPL-MCNC: 4.2 G/DL (ref 3.5–5.2)
ALBUMIN/GLOB SERPL: 1.4 G/DL
ALP SERPL-CCNC: 112 U/L (ref 39–117)
ALT SERPL W P-5'-P-CCNC: 15 U/L (ref 1–33)
ANION GAP SERPL CALCULATED.3IONS-SCNC: 12.7 MMOL/L (ref 5–15)
AST SERPL-CCNC: 17 U/L (ref 1–32)
BASOPHILS # BLD AUTO: 0.03 10*3/MM3 (ref 0–0.2)
BASOPHILS NFR BLD AUTO: 0.5 % (ref 0–1.5)
BILIRUB SERPL-MCNC: 0.7 MG/DL (ref 0–1.2)
BUN SERPL-MCNC: 5 MG/DL (ref 6–20)
BUN/CREAT SERPL: 9.3 (ref 7–25)
CALCIUM SPEC-SCNC: 9.6 MG/DL (ref 8.6–10.5)
CHLORIDE SERPL-SCNC: 104 MMOL/L (ref 98–107)
CO2 SERPL-SCNC: 24.3 MMOL/L (ref 22–29)
CREAT SERPL-MCNC: 0.54 MG/DL (ref 0.57–1)
DEPRECATED RDW RBC AUTO: 51 FL (ref 37–54)
EGFRCR SERPLBLD CKD-EPI 2021: 110.9 ML/MIN/1.73
EOSINOPHIL # BLD AUTO: 0.27 10*3/MM3 (ref 0–0.4)
EOSINOPHIL NFR BLD AUTO: 4.1 % (ref 0.3–6.2)
ERYTHROCYTE [DISTWIDTH] IN BLOOD BY AUTOMATED COUNT: 14.2 % (ref 12.3–15.4)
GLOBULIN UR ELPH-MCNC: 2.9 GM/DL
GLUCOSE SERPL-MCNC: 134 MG/DL (ref 65–99)
HCT VFR BLD AUTO: 44 % (ref 34–46.6)
HGB BLD-MCNC: 14.7 G/DL (ref 12–15.9)
IMM GRANULOCYTES # BLD AUTO: 0.02 10*3/MM3 (ref 0–0.05)
IMM GRANULOCYTES NFR BLD AUTO: 0.3 % (ref 0–0.5)
LYMPHOCYTES # BLD AUTO: 1.31 10*3/MM3 (ref 0.7–3.1)
LYMPHOCYTES NFR BLD AUTO: 20 % (ref 19.6–45.3)
MCH RBC QN AUTO: 32.5 PG (ref 26.6–33)
MCHC RBC AUTO-ENTMCNC: 33.4 G/DL (ref 31.5–35.7)
MCV RBC AUTO: 97.3 FL (ref 79–97)
MONOCYTES # BLD AUTO: 0.41 10*3/MM3 (ref 0.1–0.9)
MONOCYTES NFR BLD AUTO: 6.3 % (ref 5–12)
NEUTROPHILS NFR BLD AUTO: 4.51 10*3/MM3 (ref 1.7–7)
NEUTROPHILS NFR BLD AUTO: 68.8 % (ref 42.7–76)
NRBC BLD AUTO-RTO: 0 /100 WBC (ref 0–0.2)
PLATELET # BLD AUTO: 267 10*3/MM3 (ref 140–450)
PMV BLD AUTO: 9.7 FL (ref 6–12)
POTASSIUM SERPL-SCNC: 3.3 MMOL/L (ref 3.5–5.2)
PROT SERPL-MCNC: 7.1 G/DL (ref 6–8.5)
RBC # BLD AUTO: 4.52 10*6/MM3 (ref 3.77–5.28)
SODIUM SERPL-SCNC: 141 MMOL/L (ref 136–145)
WBC NRBC COR # BLD AUTO: 6.55 10*3/MM3 (ref 3.4–10.8)

## 2024-05-15 PROCEDURE — 85025 COMPLETE CBC W/AUTO DIFF WBC: CPT

## 2024-05-15 PROCEDURE — 80053 COMPREHEN METABOLIC PANEL: CPT

## 2024-05-15 PROCEDURE — 36415 COLL VENOUS BLD VENIPUNCTURE: CPT

## 2024-05-15 RX ORDER — AMOXICILLIN 250 MG
1 CAPSULE ORAL DAILY PRN
COMMUNITY

## 2024-05-15 NOTE — PROGRESS NOTES
Subjective       REASONS FOR FOLLOW UP  1.  DVT of the left lower extremity due to immobility, paralyzed left lower extremity and minimal mobility due to spina bifida  2.  Factor V Leiden  3.  Multifactorial Anemia    History of Present Illness    The patient is a 53 y.o. female with the above-mentioned history returns today for 6-month follow-up.  She continues on low-dose Eliquis 2.5 mg twice daily in the setting of previous DVT.      She continues to do well with no bleeding difficulty and tolerating Eliquis well.  She does receive drug assistance through our office.  She does note that in the next few months she will be moving to [place] and establishing with new medical care there.  We discussed letting us know closer to the time of her moving and we can provide her medical records to her to help transition her more smoothly.    She otherwise denies new concerns for us at this time.    Past Medical History:   Diagnosis Date    Acute cystitis without hematuria 02/29/2024    Bowel and bladder incontinence     Breast cancer screening by mammogram 02/28/2024    Colon cancer screening 02/28/2024    Constipation     DVT (deep venous thrombosis) 2012 2020    BOTH LEGS    Encounter to establish care 02/28/2024    Fibroids     UTERINE    Heavy periods     History of anemia     Muscle weakness     IN LEGS WEARS LEG BRACES    Need for hepatitis C screening test 02/28/2024    Paralysis of left lower extremity     with minimal mobility to RLE    Spina bifida      Past Surgical History:   Procedure Laterality Date    LEG SURGERY      MULTIPLE LEG SURGERIES FOR SPINA BIFIDA    SPINE SURGERY  1971    childhood    TOTAL ABDOMINAL HYSTERECTOMY N/A 6/23/2021    Procedure: TOTAL ABDOMINAL HYSTERECTOMY, BILATERAL SALPINGECTOMY, RIGHT OOPHRECTOMY, LYSIS OF ADHESIONS ;  Surgeon: Emani Chapa MD;  Location: [facility] OR [facility];  Service: Obstetrics/Gynecology;  Laterality: N/A;    UTERINE FIBROID SURGERY  2012     SHUNT INSERTION   "1971 1973 1976     Family History   Problem Relation Age of Onset    Malig Hyperthermia Neg Hx       Social History     Socioeconomic History    Marital status: Single   Tobacco Use    Smoking status: Never    Smokeless tobacco: Never   Substance and Sexual Activity    Alcohol use: Not Currently     Comment: occasionally    Drug use: Never    Sexual activity: Defer     Current Outpatient Medications on File Prior to Visit   Medication Sig Dispense Refill    apixaban (ELIQUIS) 2.5 MG tablet tablet Take 1 tablet by mouth 2 (Two) Times a Day. 60 tablet 5    Cholecalciferol (D-5000) 125 MCG (5000 UT) tablet Take 1 tablet by mouth Daily. 30 tablet 2    polyethylene glycol (MiraLax) 17 GM/SCOOP powder Take 17 g by mouth Daily. Mix one scoopfull in a full glass of water and mix and drink once a day. 1500 g 5    Prenatal Multivit-Min-Fe-FA (Prenatal, w/Iron & FA,) 27-0.8 MG tablet Take 1 tablet by mouth Daily. 90 tablet 3    apixaban (ELIQUIS) 2.5 MG tablet tablet Take 1 tablet by mouth 2 (Two) Times a Day. 60 tablet 5    carbamide peroxide (Debrox) 6.5 % otic solution Administer 5 drops into both ears 2 (Two) Times a Day. (Patient not taking: Reported on 5/15/2024) 1 each 0    Incontinence Supply Disposable (Briefs Overnight Medium) misc Use 1 each Every 4 (Four) Hours As Needed (INCONTINENCE). 180 each 12    sennosides-docusate (senna-docusate sodium) 8.6-50 MG per tablet Take 1 tablet by mouth Daily As Needed for Constipation.       No current facility-administered medications on file prior to visit.       ALLERGIES:    Allergies   Allergen Reactions    Hydrolyzed Silk Hives, Itching, Palpitations and Shortness Of Breath       Objective      Vitals:    05/15/24 1444   BP: 131/84   Pulse: 89   Temp: 98.9 °F (37.2 °C)   TempSrc: Oral   SpO2: 98%   Weight: 68 kg (150 lb)  Comment: stated wt. Weighed 2 weeks ago. Unable to stand   Height: 147.3 cm (57.99\")   PainSc: 0-No pain         5/15/2024     2:45 PM   Current Status "   ECOG score 0     PHYSICAL EXAM:  GENERAL:  Well-developed, well-nourished in no acute distress.  Seated on a motorized scooter  SKIN:  Warm, dry without rashes, purpura or petechiae.  HEAD:  Normocephalic.  EYES:  Pupils equal, round.  EOMs intact.  Conjunctivae normal.  EARS:  Hearing intact.  NOSE:  Septum midline.  No excoriations or nasal discharge.  CHEST: Breathing unlabored, no acute distress  EXTREMITIES: Braces in place on bilateral lower extremities  NEUROLOGICAL: No focal neurological deficits.  PSYCHIATRIC:  Normal affect and mood.      I have reexamined the patient and the results are consistent with the previously documented exam. Matilde Rice, CRISTA       RECENT LABS:  Results from last 7 days   Lab Units 05/15/24  1437   WBC 10*3/mm3 6.55   NEUTROS ABS 10*3/mm3 4.51   HEMOGLOBIN g/dL 14.7   HEMATOCRIT % 44.0   PLATELETS 10*3/mm3 267     Results from last 7 days   Lab Units 05/15/24  1437   SODIUM mmol/L 141   POTASSIUM mmol/L 3.3*   CHLORIDE mmol/L 104   CO2 mmol/L 24.3   BUN mg/dL 5*   CREATININE mg/dL 0.54*   CALCIUM mg/dL 9.6   ALBUMIN g/dL 4.2   BILIRUBIN mg/dL 0.7   ALK PHOS U/L 112   ALT (SGPT) U/L 15   AST (SGOT) U/L 17   GLUCOSE mg/dL 134*           Assessment & Plan      1.  Extensive bilateral DVT  Initially hospitalized 8/28/2020 through 8/30/2020 with extensive DVT  Thrombophilia labs documented factor V Leiden heterozygosity  Prothrombin gene mutation negative, the rest of thrombophilia work-up was negative  She initiated on Eliquis 5 mg twice daily  Eliquis was then dose reduced to 2.5 mg twice daily due to anemia in light of bleeding  She continues with excellent tolerance of Eliquis without signs or symptoms of bleeding  2/2022 Patient bridging with Lovenox around the time of total abdominal hysterectomy with Dr. Chapa.  Did well postoperatively.  She continues on Eliquis 2.5 mg twice daily with excellent tolerance.    2.  Anemia secondary to menorrhagia  Significant with  hemoglobin as low as 7.5 with low MCV and heavy menstrual cycles  This is much improved since dose reduction of her Eliquis to 2.5 mg twice daily  Continues on a prenatal vitamin  She did undergo hysterectomy with resolution of her anemia  Continues on a daily prenatal vitamin      PLAN:  Continue Eliquis 2.5 mg twice daily.  Patient does receive medication assistance through our office.  Will not make the patient formal follow-up in our office as she will be moving to [place] in the next few months.  I did encourage her to let us know when she is closer to moving and we can provide her her medical records to help transition her more smoothly.          Matilde Rice, APRN   5/15/2024      CC:

## 2024-05-15 NOTE — PROGRESS NOTES
Specialty Pharmacy Patient Management Program  Oncology 6-Month Clinical Assessment       Angélica Lopez is a 53 y.o. female with factor V Leiden deficiency seen today to assess adherence and side effects.    Regimen: eliquis 2.5 mg po bid    Reason for Outreach: Routine medication check-in .       Problem list reviewed by Alicia Damon RPH on 5/15/2024 at  3:32 PM  Medicines reviewed by Alicia Damon RPH on 5/15/2024 at  3:32 PM  Allergies reviewed by Alicia Damon RPH on 5/15/2024 at  3:32 PM     Goals Addressed Today        Specialty Pharmacy General Goal      Prevent blood clot            Medication Assessment:  Medication Assessment  Follow Up Clinical Assessment  Linked Medication(s) Assessed: Apixaban  Therapeutic appropriateness: Appropriate  Medication tolerability: Tolerating with no to minimal ADRs  Medication plan: Continue therapy with normal follow-up  Quality of Life Improvement Scale: 5-No change  Administration: Patient is taking every day at the same time  and twice daily.   Patient can self administer medications: yes  Medication Follow-Up Plan: Next clinical assessment  Lab Review: The labs listed below have been reviewed. No dose adjustments are needed for the oral specialty medication(s) based on the labs.    Lab Results   Component Value Date    GLUCOSE 134 (H) 05/15/2024    CALCIUM 9.6 05/15/2024     05/15/2024    K 3.3 (L) 05/15/2024    CO2 24.3 05/15/2024     05/15/2024    BUN 5 (L) 05/15/2024    CREATININE 0.54 (L) 05/15/2024    EGFRIFNONA 131 07/14/2021    BCR 9.3 05/15/2024    ANIONGAP 12.7 05/15/2024     Lab Results   Component Value Date    WBC 6.55 05/15/2024    RBC 4.52 05/15/2024    HGB 14.7 05/15/2024    HCT 44.0 05/15/2024    MCV 97.3 (H) 05/15/2024    MCH 32.5 05/15/2024    MCHC 33.4 05/15/2024    RDW 14.2 05/15/2024    RDWSD 51.0 05/15/2024    MPV 9.7 05/15/2024     05/15/2024    NEUTRORELPCT 68.8 05/15/2024    LYMPHORELPCT 20.0 05/15/2024     MONORELPCT 6.3 05/15/2024    EOSRELPCT 4.1 05/15/2024    BASORELPCT 0.5 05/15/2024    AUTOIGPER 0.3 05/15/2024    NEUTROABS 4.51 05/15/2024    LYMPHSABS 1.31 05/15/2024    MONOSABS 0.41 05/15/2024    EOSABS 0.27 05/15/2024    BASOSABS 0.03 05/15/2024    AUTOIGNUM 0.02 05/15/2024    NRBC 0.0 05/15/2024     Drug-drug interactions  Completed medication reconciliation today to assess for drug interactions. Patient denies starting or stopping any medications.    Assessed medication list for interactions, no significant drug interactions noted.   Advised patient to call the clinic if any new medications are started so we can assess for drug-drug interactions.  Drug-food interactions discussed: eating grapefruit and drinking grapefruit juice  Vaccines are coordinated by the patient's oncologist and primary care provider.    Allergies  Known allergies and reactions were discussed with the patient. The patient's chart has been reviewed for allergy information and updated as necessary.   Allergies   Allergen Reactions    Hydrolyzed Silk Hives, Itching, Palpitations and Shortness Of Breath        Hospitalizations and Urgent Care Visits Since Last Assessment:  Unplanned hospitalizations or inpatient admissions: no  ED Visits: no  Urgent Office Visits: no    Adherence Assessment:  Adherence Questions  Linked Medication(s) Assessed: Apixaban  On average, how many doses/injections does the patient miss per month?: 0  What are the identified reasons for non-adherence or missed doses? : no problems identified  What is the estimated medication adherence level?: %  Based on the patient/caregiver response and refill history, does this patient require an MTP to track adherence improvements?: no    Quality of Life Assessment:  Quality of Life Assessment  Quality of Life Improvement Scale: 5-No change  -- Quality of Life: 8/10    Financial Assessment:  Medication availability/affordability: Patient has had no issues obtaining  medication from pharmacy.    Attestation     I attest the patient was actively involved in and has agreed to the above plan of care.  If the prescribed therapy is at any point deemed not appropriate based on the current or future assessments, a consultation will be initiated with the patient's specialty care provider to determine the best course of action. The revised plan of therapy will be documented along with any required assessments and/or additional patient education provided.       All questions addressed and patient had no additional concerns. Patient has pharmacy contact information.    Name/Credentials: Alicia Damon, Doretha, BCPS    5/15/2024  15:33 EDT

## 2024-05-16 ENCOUNTER — TELEPHONE (OUTPATIENT)
Dept: ONCOLOGY | Facility: CLINIC | Age: 54
End: 2024-05-16
Payer: COMMERCIAL

## 2024-05-16 RX ORDER — POTASSIUM CHLORIDE 20 MEQ/1
20 TABLET, EXTENDED RELEASE ORAL DAILY
Qty: 30 TABLET | Refills: 2 | Status: SHIPPED | OUTPATIENT
Start: 2024-05-16

## 2024-05-16 NOTE — TELEPHONE ENCOUNTER
Called patient to relayed message:     CALL HER K IS LOW SHE MUST TAKE KCL 20 MEQ A DAY.     Left a detailed voicemail with a request to call me back at [phone]. Sent script. Nely Wagner RN

## 2024-05-22 ENCOUNTER — SPECIALTY PHARMACY (OUTPATIENT)
Dept: PHARMACY | Facility: HOSPITAL | Age: 54
End: 2024-05-22
Payer: COMMERCIAL

## 2024-05-22 NOTE — PROGRESS NOTES
Specialty Pharmacy Refill Coordination Note     Angélica is a 53 y.o. female contacted today regarding refills of Eliquis medication(s).    Reviewed and verified with patient:       Specialty medication(s) and dose(s) confirmed: yes  Eliquis 2.5 mg twice per day.    Refill Questions      Flowsheet Row Most Recent Value   Changes to allergies? No   Changes to medications? No   New conditions or infections since last clinic visit No   Unplanned office visit, urgent care, ED, or hospital admission in the last 4 weeks  No   How does patient/caregiver feel medication is working? Good   Financial problems or insurance changes  No   If yes, describe changes in insurance or financial issues. N/A   Since the previous refill, were any specialty medication doses or scheduled injections missed or delayed?  No   Does this patient require a clinical escalation to a pharmacist? No            Delivery Questions      Flowsheet Row Most Recent Value   Delivery method Beeline  [Ship to home address-Ship 5/22 for delivery 5/23-$10 copay with copay card-CCOF-Address Confirmed]   Delivery address verified with patient/caregiver? Yes  [Ship to home address]   Delivery address Home   Number of medications in delivery 1   Medication(s) being filled and delivered Apixaban   Doses left of specialty medications Enough until 5/24   Copay verified? Yes   Copay amount $10 copay-CCOF   Copay form of payment Credit/debit on file          Eliquis delivery coordinated with pt for 5/23/2024 to her home address. $10 copay with copay card. Her last delivery was on 4/25/2024. No questions or concerns to report to MTM Team today.     Follow-up: 21 day(s)     Angélica Calix, Pharmacy Technician  Specialty Pharmacy Technician

## 2024-06-06 ENCOUNTER — TELEPHONE (OUTPATIENT)
Dept: FAMILY MEDICINE CLINIC | Facility: CLINIC | Age: 54
End: 2024-06-06
Payer: COMMERCIAL

## 2024-06-21 ENCOUNTER — SPECIALTY PHARMACY (OUTPATIENT)
Dept: PHARMACY | Facility: HOSPITAL | Age: 54
End: 2024-06-21
Payer: COMMERCIAL

## 2024-06-21 NOTE — PROGRESS NOTES
Specialty Pharmacy Refill Coordination Note     Angélica is a 54 y.o. female contacted today regarding refills of Eliquis medication(s).    Reviewed and verified with patient:       Specialty medication(s) and dose(s) confirmed: yes  Eliquis 2.5 mg twice per day.    Refill Questions      Flowsheet Row Most Recent Value   Changes to allergies? No   Changes to medications? No   New conditions or infections since last clinic visit No   Unplanned office visit, urgent care, ED, or hospital admission in the last 4 weeks  No   How does patient/caregiver feel medication is working? Good   Financial problems or insurance changes  No   If yes, describe changes in insurance or financial issues. N/A   Since the previous refill, were any specialty medication doses or scheduled injections missed or delayed?  No   Does this patient require a clinical escalation to a pharmacist? No            Delivery Questions      Flowsheet Row Most Recent Value   Delivery method Be[facility]  [facility] same day-$10 copay with insurance anc copay card-[facility] ([id number])]   Delivery address verified with patient/caregiver? Yes  [Ship to [facility]]   Delivery address Prescription  [[facility] 6/24 same day-$10 copay with insurance anc copay card-[facility] ([id number])]   Number of medications in delivery 1   Medication(s) being filled and delivered Apixaban   Doses left of specialty medications Enough until 6/24   Copay verified? Yes   Copay amount $10 copay with insurance and copay card-[facility]   Copay form of payment Credit/debit on file          Eliquis delivery [facility] with pt for  on 6/24/2024. $10 copay with insurance and copay card-[facility]. Her last delivery was on 5/23/2024. No questions or concerns to report to MTM Team today.     Follow-up: 21 day(s)     Angélica Calix, Pharmacy Technician  Specialty Pharmacy Technician

## 2024-06-25 ENCOUNTER — SPECIALTY PHARMACY (OUTPATIENT)
Dept: PHARMACY | Facility: HOSPITAL | Age: 54
End: 2024-06-25
Payer: COMMERCIAL

## 2024-06-25 NOTE — PROGRESS NOTES
I have received Eliquis from [facility]  and placed it in locked storage inside the [facility] office at [facility].        Angélica Calix, Pharmacy Technician  Specialty Pharmacy Technician

## 2024-07-19 ENCOUNTER — SPECIALTY PHARMACY (OUTPATIENT)
Dept: PHARMACY | Facility: HOSPITAL | Age: 54
End: 2024-07-19
Payer: COMMERCIAL

## 2024-07-19 NOTE — PROGRESS NOTES
Eliquis supply from [facility] picked up by pt on 7/19/2024.     Angélica Calix, Pharmacy Technician  Specialty Pharmacy Technician

## 2024-08-06 ENCOUNTER — TELEPHONE (OUTPATIENT)
Dept: FAMILY MEDICINE CLINIC | Facility: CLINIC | Age: 54
End: 2024-08-06

## 2024-08-06 NOTE — TELEPHONE ENCOUNTER
Called and spoke with pt.  Relayed to pt the paperwork has been received, once signed it will be faxed today.  Pt voiced understanding.  When asked if she wanted notification once faxed, she stated she will receive a confirmation on her end.

## 2024-08-06 NOTE — TELEPHONE ENCOUNTER
Caller: Angélica Lopez    Relationship: Self    Best call back number: [phone]     What is the best time to reach you: ANY AND IT IS OKAY TO LEAVE VOICEMAIL    Who are you requesting to speak with (clinical staff, provider,  specific staff member): MICHAEL BECKMAN        What was the call regarding: PATIENT STATED THAT SHE FAXED OVER A MEDICAL FORM THAT NEEDED YOUR SIGNATURE, AND WANTED TO KNOW IF YOU RECEIVED IT, AND IF IT HAD BEEN SIGNED?    PLEASE CALL PATIENT TO ADVISE.

## 2024-08-19 ENCOUNTER — SPECIALTY PHARMACY (OUTPATIENT)
Dept: PHARMACY | Facility: HOSPITAL | Age: 54
End: 2024-08-19
Payer: COMMERCIAL

## 2024-08-21 ENCOUNTER — SPECIALTY PHARMACY (OUTPATIENT)
Dept: PHARMACY | Facility: HOSPITAL | Age: 54
End: 2024-08-21
Payer: COMMERCIAL

## 2024-08-21 NOTE — PROGRESS NOTES
I have received Eliquis from [facility]  and placed it in locked storage inside the [facility] office at [facility].     Evelia Anderson - Care Coordinator   8/21/2024  11:08 EDT

## 2024-09-17 ENCOUNTER — SPECIALTY PHARMACY (OUTPATIENT)
Dept: PHARMACY | Facility: HOSPITAL | Age: 54
End: 2024-09-17
Payer: COMMERCIAL

## 2024-09-18 ENCOUNTER — SPECIALTY PHARMACY (OUTPATIENT)
Dept: PHARMACY | Facility: HOSPITAL | Age: 54
End: 2024-09-18
Payer: COMMERCIAL

## 2024-09-20 ENCOUNTER — SPECIALTY PHARMACY (OUTPATIENT)
Dept: PHARMACY | Facility: HOSPITAL | Age: 54
End: 2024-09-20
Payer: COMMERCIAL

## 2024-10-17 ENCOUNTER — SPECIALTY PHARMACY (OUTPATIENT)
Dept: PHARMACY | Facility: HOSPITAL | Age: 54
End: 2024-10-17
Payer: COMMERCIAL

## 2024-10-17 NOTE — PROGRESS NOTES
Specialty Pharmacy Patient Management Program  Program Disenrollment      Angélica Lopez is seen by their provider for DVT. Patient was previously enrolled in the Oncology Patient Management program offered by [facility].      Disenrolling patient today due to no future appts in the office and she did not wish to make any as [place] is [place] at [place] end of the [place].    Angélica Calix, Pharmacy Technician  10/17/2024  09:55 EDT

## (undated) DEVICE — SPNG GZ WOVN 4X4IN 12PLY 10/BX STRL

## (undated) DEVICE — PK HYST ABD 40

## (undated) DEVICE — DRSNG WND BORDR/ADHS NONADHR/GZ LF 4X14IN STRL

## (undated) DEVICE — ANTIBACTERIAL UNDYED BRAIDED (POLYGLACTIN 910), SYNTHETIC ABSORBABLE SUTURE: Brand: COATED VICRYL

## (undated) DEVICE — SUT VIC 0 TIES 18IN J912G

## (undated) DEVICE — BARRIER, ABSORBABLE, ADHESION: Brand: SEPRAFILM® PROCEDURE PACK

## (undated) DEVICE — SUT VIC 1 CT1 36IN J947H

## (undated) DEVICE — SUT VIC 0 CT1 CR8 27IN JJ41G

## (undated) DEVICE — GLV SURG SENSICARE POLYISPRN W/ALOE PF LF 6.5 GRN STRL

## (undated) DEVICE — UNDYED BRAIDED (POLYGLACTIN 910), SYNTHETIC ABSORBABLE SUTURE: Brand: COATED VICRYL

## (undated) DEVICE — TOTAL TRAY, URNMTR, SILV, LF, 16FR 10ML: Brand: MEDLINE

## (undated) DEVICE — SUT VIC 4/0 SH 27IN J415H

## (undated) DEVICE — DRSNG TELFA PAD NONADH STR 1S 3X8IN

## (undated) DEVICE — GLV SURG BIOGEL LTX PF 6